# Patient Record
Sex: FEMALE | Race: WHITE | Employment: OTHER | URBAN - METROPOLITAN AREA
[De-identification: names, ages, dates, MRNs, and addresses within clinical notes are randomized per-mention and may not be internally consistent; named-entity substitution may affect disease eponyms.]

---

## 2020-11-05 NOTE — PROGRESS NOTES
Subjective:      Patient ID: Melissa Arellano is a 77 y.o. female. HPI  History and Physical    Patient's Name/Date of Birth: Melissa Arellano / 1955    Date:2020     Melissa Arellano presents for evaluation of newly diagnosed left breast cancer. ER+OR+ Intracystic papillary carcinoma. Atypical lobular hyperplasia. PCP: Dr. Divya Holbrook,     The lesion is located in the upper inner quadrant position of the Left breast. The lesion was discovered by mammogram/exam. The patient has not noted a change in BSE since presentation. Patient denies nipple discharge. Patient denies a personal history of breast cancer. Breast cancer risk factors include gender, age  Ashkenazi Jain Ancestry: No.    OBSTETRIC RELATED HISTORY:  Age of menarche was 13. Age of menopause was 53. Patient denies hormonal therapy. Patient is . Age of first live birth was 30. Patient did not breast feed. Is patient interested in fertility information about fertility preservation? No    CANCER SURVEILLANCE HISTORY:  Mammograms: Yes  Breast MRI's: No   Breast Biopsies: Yes   Colonoscopy: Yes   GI Polyps: Yes   EGD: Yes   Pelvic Exam: Yes   Pap Smear: Yes   Dermatology: No   Lung screening: no      Have you received your flu vaccination this year? Yes  Have you received your Pneumococcal vaccination? Yes      Estimated body mass index is 29.95 kg/m² as calculated from the following:    Height as of this encounter: 5' 5\" (1.651 m). Weight as of this encounter: 180 lb (81.6 kg). Bra Size: 42d    Because violence is so common, we ask all our patients: are you in a relationship or do you live with a person who threatens, hurts, or controls you:  denies    Patient drinks moderate caffeinated beverages. Patient does not smoke cigarettes. Patient does not use recreational drugs.  Patient admits to drinking 4 cans beer a day          Past Medical History:   Diagnosis Date    Allergic rhinitis     seasonal    [facility]     Hypothyroidism        Past Surgical History:   Procedure Laterality Date    COLONOSCOPY  2016    TUBAL LIGATION         Current Outpatient Medications   Medication Sig Dispense Refill    levothyroxine (SYNTHROID) 50 MCG tablet Take 50 mcg by mouth Daily      ibuprofen (ADVIL;MOTRIN) 200 MG tablet Take 200 mg by mouth every 6 hours as needed for Pain       No current facility-administered medications for this visit. No Known Allergies    Family History   Problem Relation Age of Onset    Cancer Father 79        prostate cancer    Cancer Brother 54        prostate    Cancer Brother 54        prostate cancer       Social History     Socioeconomic History    Marital status: Unknown     Spouse name: Not on file    Number of children: Not on file    Years of education: Not on file    Highest education level: Not on file   Occupational History    Not on file   Social Needs    Financial resource strain: Not on file    Food insecurity     Worry: Not on file     Inability: Not on file    Transportation needs     Medical: Not on file     Non-medical: Not on file   Tobacco Use    Smoking status: Former Smoker     Last attempt to quit: 2000     Years since quittin.8    Smokeless tobacco: Never Used   Substance and Sexual Activity    Alcohol use:  Yes     Alcohol/week: 4.0 standard drinks     Types: 4 Cans of beer per week     Comment: has drank for years    Drug use: Never    Sexual activity: Not on file   Lifestyle    Physical activity     Days per week: Not on file     Minutes per session: Not on file    Stress: Not on file   Relationships    Social connections     Talks on phone: Not on file     Gets together: Not on file     Attends Scientology service: Not on file     Active member of club or organization: Not on file     Attends meetings of clubs or organizations: Not on file     Relationship status: Not on file    Intimate partner violence     Fear of current or ex partner: Not on file     Emotionally abused: Not on file     Physically abused: Not on file     Forced sexual activity: Not on file   Other Topics Concern    Not on file   Social History Narrative    Not on file       Occupation: retired. Worked customer service prior. Review of Systems  CONSTITUTIONAL: No fever, chills. Good appetite and energy level. ENMT: Eyes: No diplopia; Nose: No epistaxis. Mouth: No sore throat. RESPIRATORY: No hemoptysis, shortness of breath, cough. CARDIOVASCULAR: No chest pain, pressure, or palpitations. GASTROINTESTINAL: No nausea/vomiting, abdominal pain, diarrhea/constipation. No blood in the stools. GENITOURINARY: No dysuria, urinary frequency, hematuria. NEURO: No syncope, presyncope, headache. Remainder:  ROS NEGATIVE    Patient denies previous history of DVT/PE. Review of systems as noted above completely reviewed with patient. No changes. Objective:   Physical Exam  Vitals signs and nursing note reviewed. Exam conducted with a chaperone present. Constitutional:       General: She is not in acute distress. Appearance: She is well-developed. She is not diaphoretic. HENT:      Head: Normocephalic and atraumatic. Eyes:      Conjunctiva/sclera: Conjunctivae normal.      Pupils: Pupils are equal, round, and reactive to light. Neck:      Musculoskeletal: Normal range of motion and neck supple. Thyroid: No thyromegaly. Trachea: No tracheal deviation. Cardiovascular:      Rate and Rhythm: Normal rate and regular rhythm. Heart sounds: Normal heart sounds. Pulmonary:      Effort: Pulmonary effort is normal. No respiratory distress. Breath sounds: Normal breath sounds. Chest:      Breasts: Breasts are symmetrical.         Right: No inverted nipple, mass, nipple discharge, skin change or tenderness. Left: No inverted nipple, mass, nipple discharge, skin change or tenderness. Abdominal:      General: There is no distension. Palpations: Abdomen is soft. Musculoskeletal:      Right shoulder: She exhibits normal range of motion. Left shoulder: She exhibits normal range of motion. Lymphadenopathy:      Cervical: No cervical adenopathy. Upper Body:      Right upper body: No supraclavicular adenopathy. Left upper body: No supraclavicular adenopathy. Skin:     General: Skin is warm and dry. Coloration: Skin is not pale. Findings: No erythema. Neurological:      Mental Status: She is alert and oriented to person, place, and time. Psychiatric:         Behavior: Behavior normal.         Thought Content: Thought content normal.         Judgment: Judgment normal.                Assessment:      77 y.o. woman who underwent an US guided left breast core biopsy on September 29 , 2020. Pathological evaluation completed at [facility]:      9/1/2020-BILATERAL MAMMOGRAM; [facility]    9/18/2020-ultrasound [address] [place] [address]          9/29/2020; [address] [place] [address]        9/29/2020-Pathology report-[address]        Clinical stage 0 left breast cancer. All imaging reviewed with patient. Plan needle localized lumpectomy for treatment and definitive diagnosis. Questions answered to patient satisfaction. Plan:      1. Left needle localized lumpectomy/LMAC  2. Patient has met with our Breast Navigator for information and to receive literature. 3. If indicated outside slides will be obtained and reviewed by Pathology at [facility]. 4. We had a discussion of the treatment options for breast cancer including risks, benefits, and recurrence rates for breast conservation therapy versus mastectomy. We discussed reconstruction options. We discussed the indications and risks of sentinel lymph node biopsy and possibility of axillary lymph node dissection. We also discussed the possible role of systemic and radiation therapy.  NCCN guidelines were

## 2020-11-06 ENCOUNTER — TELEPHONE (OUTPATIENT)
Dept: BREAST CENTER | Age: 65
End: 2020-11-06

## 2020-11-09 ENCOUNTER — TELEPHONE (OUTPATIENT)
Dept: CASE MANAGEMENT | Age: 65
End: 2020-11-09

## 2020-11-09 ENCOUNTER — OFFICE VISIT (OUTPATIENT)
Dept: BREAST CENTER | Age: 65
End: 2020-11-09
Payer: MEDICARE

## 2020-11-09 ENCOUNTER — HOSPITAL ENCOUNTER (OUTPATIENT)
Dept: GENERAL RADIOLOGY | Age: 65
Discharge: HOME | End: 2020-11-11
Payer: MEDICARE

## 2020-11-09 VITALS
SYSTOLIC BLOOD PRESSURE: 138 MMHG | DIASTOLIC BLOOD PRESSURE: 68 MMHG | BODY MASS INDEX: 29.99 KG/M2 | RESPIRATION RATE: 18 BRPM | WEIGHT: 180 LBS | TEMPERATURE: 98.6 F | HEIGHT: 65 IN | OXYGEN SATURATION: 98 % | HEART RATE: 58 BPM

## 2020-11-09 PROCEDURE — 99204 OFFICE O/P NEW MOD 45 MIN: CPT | Performed by: SURGERY

## 2020-11-09 PROCEDURE — 99203 OFFICE O/P NEW LOW 30 MIN: CPT | Performed by: SURGERY

## 2020-11-09 PROCEDURE — 71046 X-RAY EXAM CHEST 2 VIEWS: CPT

## 2020-11-09 RX ORDER — IBUPROFEN 200 MG
200 TABLET ORAL EVERY 6 HOURS PRN
Status: ON HOLD | COMMUNITY
End: 2020-12-04 | Stop reason: HOSPADM

## 2020-11-09 RX ORDER — LEVOTHYROXINE SODIUM 0.05 MG/1
50 TABLET ORAL DAILY
COMMUNITY

## 2020-11-09 NOTE — LETTER
[facility] Carmelina Valladares  [address] [place] [id number]  Phone: [phone]  Fax: [phone]    Tian Prescott MD        November 9, 3194     [facility], DO  [address]    Patient: Windy Busch  MR Number: <[id number]>  YOB: 1955  Date of Visit: 11/9/2020    Dear Dr. DAJA MALAGON:    Thank you for the request for consultation for Windy Busch to me for the evaluation of her left breast lesion. Below are the relevant portions of my assessment and plan of care. 77 y.o. woman who underwent an US guided left breast core biopsy  on September 29 , 2020. Pathological evaluation completed at [facility]:      9/1/2020-BILATERAL MAMMOGRAM; [facility]    9/18/2020-ultrasound [employer]            9/29/2020; ULTRASOUND GUIDED BIOPSY LEFT BREAST-[facility]          9/29/2020-Pathology report-[facility]          Clinical stage 0 left breast cancer. All imaging reviewed with patient. Plan needle localized lumpectomy for treatment and definitive diagnosis. Questions answered to patient satisfaction. If you have questions, please do not hesitate to call me. I look forward to following [facility] along with you.     Sincerely,  Tian Prescott MD

## 2020-11-09 NOTE — TELEPHONE ENCOUNTER
Met with patient regarding her recent breast cancer diagnosis. Instructed in detail on her breast biopsy pathology findings including cancer type Left (DCIS) and hormone receptor status (ER+, ID+). Instructed on next steps including breast surgery options, lymph node biopsy procedures and additional imaging that may be required. Informed patient that this is the beginning of their breast cancer journey and when treatment has been completed she will receive a [address] detailing the events of her specific treatment plan. Provided with extensive literature including \"Be A Survivor: Your guide to breast cancer treatment\", Your Guide to Your Breast Cancer Pathology Report, American Cancer society Exercises after breast surgery and Lymphedema Early Signs and Symptoms. Written materials on local and national support and informational groups. Today patient received copies of their pathology and imaging reports (if available) as well as a list of local medical oncology providers. Provided patient with my contact information, office hours, and encouragement to call me with questions or concerns. Patient verbalizes understanding and appreciative of nurse navigator visit. Emotional support provided and greater than 30 minutes spent with patient. Nurse navigator will continue to follow.

## 2020-11-09 NOTE — PATIENT INSTRUCTIONS
Surgery will be scheduled. We will be in contact with you soon. RELEASE OF RESULTS AND RECORDS  As of October 1, 2020, all results (x-ray reports, labwork, pathology reports) will be released through [address] in real time per federal law. Once your test results are final, they will be automatically released to your electronic medical record [employer] where you will be able to see those results and any clinical notes associated with that result. In some cases, you may see those results and notes before your provider or the staff have had a chance to review. We will make every effort to contact you, especially about any abnormal or confusing results. If you view a result before we have contacted you, please wait up to one business day for us to reach you before calling with questions. If anything in your notes seems inaccurate, please message us through [facility] with potential corrections. If you see a term or language in your clinical note that doesn't make sense, please use the online health library reference tool in your MyChart (under the Health tab)  to help you interpret the note.

## 2020-11-12 ENCOUNTER — TELEPHONE (OUTPATIENT)
Dept: BREAST CENTER | Age: 65
End: 2020-11-12

## 2020-11-12 NOTE — TELEPHONE ENCOUNTER
Patient surgery has been scheduled with surgery scheduling 12/04/20 @ 10:00am /8:00am / Arrival 6:30am - Left breast localization lumpectomy - LMAC - [facility]. Patient notified of date and time of surgery. Patient was instructed to [facility] the [facility] entrance of the hospital. Patient was instructed NPO after midnight the night prior to surgery. Patient was instructed NO ASA products or blood thinners 5-7 days prior to surgery. Patient instruction and post op instructions sheet mailed to patient. Patient was instructed to bring a sports bra with her day of surgery. Post op visit - 12/14/20 @ 10:15am [facility]. No prior authorization required.

## 2020-11-17 ENCOUNTER — PREP FOR PROCEDURE (OUTPATIENT)
Dept: BREAST CENTER | Age: 65
End: 2020-11-17

## 2020-11-17 RX ORDER — SODIUM CHLORIDE 0.9 % (FLUSH) 0.9 %
10 SYRINGE (ML) INJECTION PRN
Status: CANCELLED | OUTPATIENT
Start: 2020-11-17

## 2020-11-17 RX ORDER — SODIUM CHLORIDE, SODIUM LACTATE, POTASSIUM CHLORIDE, CALCIUM CHLORIDE 600; 310; 30; 20 MG/100ML; MG/100ML; MG/100ML; MG/100ML
INJECTION, SOLUTION INTRAVENOUS CONTINUOUS
Status: CANCELLED | OUTPATIENT
Start: 2020-11-17

## 2020-11-17 RX ORDER — SODIUM CHLORIDE 0.9 % (FLUSH) 0.9 %
10 SYRINGE (ML) INJECTION EVERY 12 HOURS SCHEDULED
Status: CANCELLED | OUTPATIENT
Start: 2020-11-17

## 2020-11-30 ENCOUNTER — HOSPITAL ENCOUNTER (OUTPATIENT)
Age: 65
Discharge: HOME | End: 2020-12-02
Payer: MEDICARE

## 2020-11-30 PROCEDURE — U0003 INFECTIOUS AGENT DETECTION BY NUCLEIC ACID (DNA OR RNA); SEVERE ACUTE RESPIRATORY SYNDROME CORONAVIRUS 2 (SARS-COV-2) (CORONAVIRUS DISEASE [COVID-19]), AMPLIFIED PROBE TECHNIQUE, MAKING USE OF HIGH THROUGHPUT TECHNOLOGIES AS DESCRIBED BY CMS-2020-01-R: HCPCS

## 2020-12-01 NOTE — PROGRESS NOTES
your machine if you are to spend the night in the hospital.     PARKING INSTRUCTIONS:   [x] Arrival Time:___0630__________  [x] PLEASE ARRIVE TO [address] [place] [address] 12/4/2020 AT 0630 AM   yOU WILL BE DIRECTED TO PRE OP. SURGERY WITH DR Isa Alfaro         [x] To reach the [facility] hernandez from [facility], upon entering the hospital, take elevator B to the 3rd floor. EDUCATION INSTRUCTIONS:      [] Knee or hip replacement booklet & exercise pamphlets given. [] [address] placed in chart. [] Pre-admission Testing educational folder given  [x] Incentive Spirometry,coughing & deep breathing exercises reviewed. [x]Medication information sheet(s)   [x]Fluoroscopy-Xray used in surgery reviewed with patient. Educational pamphlet placed in chart. [x]Pain: Post-op pain is normal and to be expected. You will be asked to rate your pain from 0-10(a zero is not acceptable-education is needed). Your post-op pain goal is:  [x] Ask your nurse for your pain medication. [] Joint camp offered. [] Joint replacement booklets given. [] Other:___________________________    MEDICATION INSTRUCTIONS:   [x]Bring a complete list of your medications, please write the last time you took the medicine, give this list to the nurse. [x] Take the following medications the morning of surgery with 1-2 ounces of water: SEE LIST  [x] Stop herbal supplements and vitamins 5 days before your surgery. [] DO NOT take any diabetic medicine the morning of surgery. Follow instructions for insulin the day before surgery. [] If you are diabetic and your blood sugar is low or you feel symptomatic, you may drink 1-2 ounces of apple juice or take a glucose tablet. The morning of your procedure, you may call the pre-op area if you have concerns about your blood sugar [phone]. [] Use your inhalers the morning of surgery. Bring your emergency inhaler with you day of surgery.    [x] Follow physician instructions regarding any blood thinners you may be taking. WHAT TO EXPECT:  [x] The day of surgery you will be greeted and checked in by the [employer].  In addition, you will be registered in the [place] by a Patient Access Representative. Please bring your photo ID and insurance card. A nurse will greet you in accordance to the time you are needed in the pre-op area to prepare you for surgery. Please do not be discouraged if you are not greeted in the order you arrive as there are many variables that are involved in patient preparation. Your patience is greatly appreciated as you wait for your nurse. Please bring in items such as: books, magazines, newspapers, electronics, or any other items  to occupy your time in the waiting area. [x]  Delays may occur with surgery and staff will make a sincere effort to keep you informed of delays. If any delays occur with your procedure, we apologize ahead of time for your inconvenience as we recognize the value of your time.

## 2020-12-01 NOTE — H&P
Patient ID: Roberth Ragland is a 77 y.o. female. HPI   History and Physical   Patient's Name/Date of Birth: Roberth Ragland / 1955     Roberth Ragland presents for lumpectomy for left breast cancer. ER+DE+ Intracystic papillary carcinoma and atypical lobular hyperplasia. PCP: Dr. Che Olivares,     The lesion is located in the upper inner quadrant position of the Left breast. The lesion was discovered by mammogram/exam. The patient has not noted a change in BSE since presentation. Patient denies nipple discharge. Patient denies a personal history of breast cancer. Breast cancer risk factors include gender, age Ashkenazi Yazidi Ancestry: No.     OBSTETRIC RELATED HISTORY:   Age of menarche was 13. Age of menopause was 53. Patient denies hormonal therapy. Patient is . Age of first live birth was 30. Patient did not breast feed. Is patient interested in fertility information about fertility preservation? No   CANCER SURVEILLANCE HISTORY:   Mammograms: Yes   Breast MRI's: No   Breast Biopsies: Yes   Colonoscopy: Yes    GI Polyps: Yes   EGD: Yes   Pelvic Exam: Yes   Pap Smear: Yes   Dermatology: No   Lung screening: no   Have you received your flu vaccination this year? Yes   Have you received your Pneumococcal vaccination? Yes   Estimated body mass index is 29.95 kg/m² as calculated from the following:   Height as of this encounter: 5' 5\" (1.651 m). Weight as of this encounter: 180 lb (81.6 kg). Bra Size: 42d   Because violence is so common, we ask all our patients: are you in a relationship or do you live with a person who threatens, hurts, or controls you: denies   Patient drinks moderate caffeinated beverages. Patient does not smoke cigarettes. Patient does not use recreational drugs.  Patient admits to drinking 4 cans beer a day   Past Medical History                                           Past Surgical History                                    Current Facility-Administered Medications                                           No Known Allergies   Family History                                                          Social History                                                                                                                                                                                                                                                                                                                  Occupation: retired. Worked customer service prior. Review of Systems   CONSTITUTIONAL: No fever, chills. Good appetite and energy level. ENMT: Eyes: No diplopia; Nose: No epistaxis. Mouth: No sore throat. RESPIRATORY: No hemoptysis, shortness of breath, cough. CARDIOVASCULAR: No chest pain, pressure, or palpitations. GASTROINTESTINAL: No nausea/vomiting, abdominal pain, diarrhea/constipation. No blood in the stools. GENITOURINARY: No dysuria, urinary frequency, hematuria. NEURO: No syncope, presyncope, headache. Remainder: ROS NEGATIVE   Patient denies previous history of DVT/PE. Review of systems as noted above completely reviewed with patient. No changes. Objective:   Physical Exam   Vitals signs and nursing note reviewed. Exam conducted with a chaperone present. Constitutional:   General: She is not in acute distress. Appearance: She is well-developed. She is not diaphoretic. HENT:   Head: Normocephalic and atraumatic. Eyes:   Conjunctiva/sclera: Conjunctivae normal.   Pupils: Pupils are equal, round, and reactive to light. Neck:   Musculoskeletal: Normal range of motion and neck supple. Thyroid: No thyromegaly. Trachea: No tracheal deviation. Cardiovascular:   Rate and Rhythm: Normal rate and regular rhythm. Heart sounds: Normal heart sounds. Pulmonary:   Effort: Pulmonary effort is normal. No respiratory distress. Breath sounds: Normal breath sounds.    Chest:   Breasts: Breasts are symmetrical.   Right: No inverted nipple, mass, nipple discharge, skin change or tenderness. Left: No inverted nipple, mass, nipple discharge, skin change or tenderness. Abdominal:   General: There is no distension. Palpations: Abdomen is soft. Musculoskeletal:   Right shoulder: She exhibits normal range of motion. Left shoulder: She exhibits normal range of motion. Lymphadenopathy:   Cervical: No cervical adenopathy. Upper Body:   Right upper body: No supraclavicular adenopathy. Left upper body: No supraclavicular adenopathy. Skin:   General: Skin is warm and dry. Coloration: Skin is not pale. Findings: No erythema. Neurological:   Mental Status: She is alert and oriented to person, place, and time. Psychiatric:   Behavior: Behavior normal.   Thought Content: Thought content normal.   Judgment: Judgment normal.     Assessment:   77 y.o. woman who underwent an US guided left breast core biopsy on September 29 , 2020. Pathological evaluation completed at [facility]:   9/1/2020-BILATERAL MAMMOGRAM; [facility]     9/18/2020-ultrasound [address] [place] [address]       9/29/2020; [address] [place] [address]     9/29/2020-Pathology report-[address]     Clinical stage 0 left breast cancer. All imaging reviewed with patient. Plan needle localized lumpectomy for treatment and definitive diagnosis. Questions answered to patient satisfaction. Plan: Needle localized left breast lumpectomy/LMAC.     The risks, benefits, expected outcomes, and alternatives to this procedure have been discussed with the patient, which include but are not limited to bleeding, infection, flap necrosis and medical complications to anesthesia or surgery such as pneumonia, heart problems, blood clots, etc. Patient also was told that with minimally invasive procedures adequate margins are not always obtained with the first operation, and she has a >20% chance of requiring a second procedure to obtain clear margins around her tumor. Patient understood and desires to undergo the procedure.

## 2020-12-02 LAB
SARS-COV-2: NOT DETECTED
SOURCE: NORMAL

## 2020-12-03 ENCOUNTER — ANESTHESIA EVENT (OUTPATIENT)
Dept: OPERATING ROOM | Age: 65
End: 2020-12-03
Payer: MEDICARE

## 2020-12-04 ENCOUNTER — HOSPITAL ENCOUNTER (OUTPATIENT)
Age: 65
Setting detail: OUTPATIENT SURGERY
Discharge: HOME | End: 2020-12-04
Attending: SURGERY | Admitting: SURGERY
Payer: MEDICARE

## 2020-12-04 ENCOUNTER — APPOINTMENT (OUTPATIENT)
Dept: GENERAL RADIOLOGY | Age: 65
End: 2020-12-04
Attending: SURGERY
Payer: MEDICARE

## 2020-12-04 ENCOUNTER — HOSPITAL ENCOUNTER (OUTPATIENT)
Dept: GENERAL RADIOLOGY | Age: 65
Discharge: HOME | End: 2020-12-06
Attending: SURGERY
Payer: MEDICARE

## 2020-12-04 ENCOUNTER — ANESTHESIA (OUTPATIENT)
Dept: OPERATING ROOM | Age: 65
End: 2020-12-04
Payer: MEDICARE

## 2020-12-04 VITALS
SYSTOLIC BLOOD PRESSURE: 152 MMHG | WEIGHT: 180 LBS | RESPIRATION RATE: 18 BRPM | TEMPERATURE: 97.2 F | HEART RATE: 57 BPM | BODY MASS INDEX: 29.99 KG/M2 | HEIGHT: 65 IN | OXYGEN SATURATION: 100 % | DIASTOLIC BLOOD PRESSURE: 76 MMHG

## 2020-12-04 VITALS
OXYGEN SATURATION: 99 % | SYSTOLIC BLOOD PRESSURE: 131 MMHG | DIASTOLIC BLOOD PRESSURE: 72 MMHG | RESPIRATION RATE: 13 BRPM

## 2020-12-04 PROCEDURE — 88342 IMHCHEM/IMCYTCHM 1ST ANTB: CPT

## 2020-12-04 PROCEDURE — 88341 IMHCHEM/IMCYTCHM EA ADD ANTB: CPT

## 2020-12-04 PROCEDURE — 3700000001 HC ADD 15 MINUTES (ANESTHESIA): Performed by: SURGERY

## 2020-12-04 PROCEDURE — 2500000003 HC RX 250 WO HCPCS

## 2020-12-04 PROCEDURE — 88307 TISSUE EXAM BY PATHOLOGIST: CPT

## 2020-12-04 PROCEDURE — 3600000003 HC SURGERY LEVEL 3 BASE: Performed by: SURGERY

## 2020-12-04 PROCEDURE — 76098 X-RAY EXAM SURGICAL SPECIMEN: CPT

## 2020-12-04 PROCEDURE — 2709999900 HC NON-CHARGEABLE SUPPLY: Performed by: SURGERY

## 2020-12-04 PROCEDURE — 7100000011 HC PHASE II RECOVERY - ADDTL 15 MIN: Performed by: SURGERY

## 2020-12-04 PROCEDURE — 3700000000 HC ANESTHESIA ATTENDED CARE: Performed by: SURGERY

## 2020-12-04 PROCEDURE — 88360 TUMOR IMMUNOHISTOCHEM/MANUAL: CPT

## 2020-12-04 PROCEDURE — 6360000002 HC RX W HCPCS

## 2020-12-04 PROCEDURE — 7100000010 HC PHASE II RECOVERY - FIRST 15 MIN: Performed by: SURGERY

## 2020-12-04 PROCEDURE — 2720000010 HC SURG SUPPLY STERILE: Performed by: SURGERY

## 2020-12-04 PROCEDURE — 19281 PERQ DEVICE BREAST 1ST IMAG: CPT

## 2020-12-04 PROCEDURE — 2580000003 HC RX 258

## 2020-12-04 PROCEDURE — 2500000003 HC RX 250 WO HCPCS: Performed by: SURGERY

## 2020-12-04 PROCEDURE — 6360000002 HC RX W HCPCS: Performed by: SURGERY

## 2020-12-04 PROCEDURE — 19301 PARTIAL MASTECTOMY: CPT | Performed by: SURGERY

## 2020-12-04 PROCEDURE — 3600000013 HC SURGERY LEVEL 3 ADDTL 15MIN: Performed by: SURGERY

## 2020-12-04 RX ORDER — KETOROLAC TROMETHAMINE 30 MG/ML
INJECTION, SOLUTION INTRAMUSCULAR; INTRAVENOUS PRN
Status: DISCONTINUED | OUTPATIENT
Start: 2020-12-04 | End: 2020-12-04 | Stop reason: SDUPTHER

## 2020-12-04 RX ORDER — MIDAZOLAM HYDROCHLORIDE 1 MG/ML
INJECTION INTRAMUSCULAR; INTRAVENOUS PRN
Status: DISCONTINUED | OUTPATIENT
Start: 2020-12-04 | End: 2020-12-04 | Stop reason: SDUPTHER

## 2020-12-04 RX ORDER — ACETAMINOPHEN 500 MG
500 TABLET ORAL 4 TIMES DAILY PRN
Qty: 120 TABLET | Refills: 0 | Status: SHIPPED | OUTPATIENT
Start: 2020-12-04 | End: 2021-03-30

## 2020-12-04 RX ORDER — SODIUM CHLORIDE 0.9 % (FLUSH) 0.9 %
10 SYRINGE (ML) INJECTION EVERY 12 HOURS SCHEDULED
Status: DISCONTINUED | OUTPATIENT
Start: 2020-12-04 | End: 2020-12-04 | Stop reason: HOSPADM

## 2020-12-04 RX ORDER — ONDANSETRON 2 MG/ML
4 INJECTION INTRAMUSCULAR; INTRAVENOUS
Status: DISCONTINUED | OUTPATIENT
Start: 2020-12-04 | End: 2020-12-04 | Stop reason: HOSPADM

## 2020-12-04 RX ORDER — BUPIVACAINE HYDROCHLORIDE 2.5 MG/ML
INJECTION, SOLUTION EPIDURAL; INFILTRATION; INTRACAUDAL PRN
Status: DISCONTINUED | OUTPATIENT
Start: 2020-12-04 | End: 2020-12-04 | Stop reason: ALTCHOICE

## 2020-12-04 RX ORDER — MORPHINE SULFATE 2 MG/ML
1 INJECTION, SOLUTION INTRAMUSCULAR; INTRAVENOUS EVERY 5 MIN PRN
Status: DISCONTINUED | OUTPATIENT
Start: 2020-12-04 | End: 2020-12-04 | Stop reason: HOSPADM

## 2020-12-04 RX ORDER — MORPHINE SULFATE 2 MG/ML
2 INJECTION, SOLUTION INTRAMUSCULAR; INTRAVENOUS EVERY 5 MIN PRN
Status: DISCONTINUED | OUTPATIENT
Start: 2020-12-04 | End: 2020-12-04 | Stop reason: HOSPADM

## 2020-12-04 RX ORDER — MEPERIDINE HYDROCHLORIDE 25 MG/ML
12.5 INJECTION INTRAMUSCULAR; INTRAVENOUS; SUBCUTANEOUS EVERY 5 MIN PRN
Status: DISCONTINUED | OUTPATIENT
Start: 2020-12-04 | End: 2020-12-04 | Stop reason: HOSPADM

## 2020-12-04 RX ORDER — IBUPROFEN 600 MG/1
600 TABLET ORAL 4 TIMES DAILY PRN
Qty: 40 TABLET | Refills: 0 | Status: SHIPPED | OUTPATIENT
Start: 2020-12-04 | End: 2021-03-30

## 2020-12-04 RX ORDER — SODIUM CHLORIDE, SODIUM LACTATE, POTASSIUM CHLORIDE, CALCIUM CHLORIDE 600; 310; 30; 20 MG/100ML; MG/100ML; MG/100ML; MG/100ML
INJECTION, SOLUTION INTRAVENOUS CONTINUOUS
Status: DISCONTINUED | OUTPATIENT
Start: 2020-12-04 | End: 2020-12-04 | Stop reason: HOSPADM

## 2020-12-04 RX ORDER — FENTANYL CITRATE 50 UG/ML
25 INJECTION, SOLUTION INTRAMUSCULAR; INTRAVENOUS EVERY 5 MIN PRN
Status: DISCONTINUED | OUTPATIENT
Start: 2020-12-04 | End: 2020-12-04 | Stop reason: HOSPADM

## 2020-12-04 RX ORDER — FENTANYL CITRATE 50 UG/ML
50 INJECTION, SOLUTION INTRAMUSCULAR; INTRAVENOUS EVERY 5 MIN PRN
Status: DISCONTINUED | OUTPATIENT
Start: 2020-12-04 | End: 2020-12-04 | Stop reason: HOSPADM

## 2020-12-04 RX ORDER — SODIUM CHLORIDE, SODIUM LACTATE, POTASSIUM CHLORIDE, CALCIUM CHLORIDE 600; 310; 30; 20 MG/100ML; MG/100ML; MG/100ML; MG/100ML
INJECTION, SOLUTION INTRAVENOUS CONTINUOUS PRN
Status: DISCONTINUED | OUTPATIENT
Start: 2020-12-04 | End: 2020-12-04 | Stop reason: SDUPTHER

## 2020-12-04 RX ORDER — SODIUM CHLORIDE 0.9 % (FLUSH) 0.9 %
10 SYRINGE (ML) INJECTION PRN
Status: DISCONTINUED | OUTPATIENT
Start: 2020-12-04 | End: 2020-12-04 | Stop reason: HOSPADM

## 2020-12-04 RX ORDER — PROPOFOL 10 MG/ML
INJECTION, EMULSION INTRAVENOUS CONTINUOUS PRN
Status: DISCONTINUED | OUTPATIENT
Start: 2020-12-04 | End: 2020-12-04 | Stop reason: SDUPTHER

## 2020-12-04 RX ORDER — OXYCODONE HYDROCHLORIDE AND ACETAMINOPHEN 5; 325 MG/1; MG/1
1 TABLET ORAL
Status: DISCONTINUED | OUTPATIENT
Start: 2020-12-04 | End: 2020-12-04 | Stop reason: HOSPADM

## 2020-12-04 RX ORDER — FENTANYL CITRATE 50 UG/ML
INJECTION, SOLUTION INTRAMUSCULAR; INTRAVENOUS PRN
Status: DISCONTINUED | OUTPATIENT
Start: 2020-12-04 | End: 2020-12-04 | Stop reason: SDUPTHER

## 2020-12-04 RX ORDER — LIDOCAINE HYDROCHLORIDE 20 MG/ML
INJECTION, SOLUTION INTRAVENOUS PRN
Status: DISCONTINUED | OUTPATIENT
Start: 2020-12-04 | End: 2020-12-04 | Stop reason: SDUPTHER

## 2020-12-04 RX ADMIN — PROPOFOL 75 MCG/KG/MIN: 10 INJECTION, EMULSION INTRAVENOUS at 13:53

## 2020-12-04 RX ADMIN — KETOROLAC TROMETHAMINE 30 MG: 30 INJECTION, SOLUTION INTRAMUSCULAR; INTRAVENOUS at 14:40

## 2020-12-04 RX ADMIN — MIDAZOLAM 2 MG: 1 INJECTION INTRAMUSCULAR; INTRAVENOUS at 13:44

## 2020-12-04 RX ADMIN — FENTANYL CITRATE 50 MCG: 50 INJECTION, SOLUTION INTRAMUSCULAR; INTRAVENOUS at 13:53

## 2020-12-04 RX ADMIN — LIDOCAINE HYDROCHLORIDE 40 MG: 20 INJECTION, SOLUTION INTRAVENOUS at 13:53

## 2020-12-04 RX ADMIN — FENTANYL CITRATE 50 MCG: 50 INJECTION, SOLUTION INTRAMUSCULAR; INTRAVENOUS at 14:14

## 2020-12-04 RX ADMIN — SODIUM CHLORIDE, POTASSIUM CHLORIDE, SODIUM LACTATE AND CALCIUM CHLORIDE: 600; 310; 30; 20 INJECTION, SOLUTION INTRAVENOUS at 13:35

## 2020-12-04 RX ADMIN — Medication 2 G: at 13:47

## 2020-12-04 ASSESSMENT — PULMONARY FUNCTION TESTS
PIF_VALUE: 26
PIF_VALUE: 1
PIF_VALUE: 1
PIF_VALUE: 2
PIF_VALUE: 33
PIF_VALUE: 27
PIF_VALUE: 1
PIF_VALUE: 2
PIF_VALUE: 0
PIF_VALUE: 1
PIF_VALUE: 1
PIF_VALUE: 27
PIF_VALUE: 27
PIF_VALUE: 0
PIF_VALUE: 26
PIF_VALUE: 0
PIF_VALUE: 1
PIF_VALUE: 1
PIF_VALUE: 6
PIF_VALUE: 1
PIF_VALUE: 28
PIF_VALUE: 27
PIF_VALUE: 28
PIF_VALUE: 0
PIF_VALUE: 27
PIF_VALUE: 0
PIF_VALUE: 0
PIF_VALUE: 27
PIF_VALUE: 28
PIF_VALUE: 1
PIF_VALUE: 1
PIF_VALUE: 27
PIF_VALUE: 0
PIF_VALUE: 27
PIF_VALUE: 1
PIF_VALUE: 1
PIF_VALUE: 27
PIF_VALUE: 1
PIF_VALUE: 1
PIF_VALUE: 28
PIF_VALUE: 27
PIF_VALUE: 1
PIF_VALUE: 1
PIF_VALUE: 30
PIF_VALUE: 27
PIF_VALUE: 0
PIF_VALUE: 1
PIF_VALUE: 26
PIF_VALUE: 1
PIF_VALUE: 1
PIF_VALUE: 0
PIF_VALUE: 26
PIF_VALUE: 5

## 2020-12-04 ASSESSMENT — PAIN DESCRIPTION - DESCRIPTORS: DESCRIPTORS: ACHING;SORE

## 2020-12-04 ASSESSMENT — PAIN DESCRIPTION - LOCATION: LOCATION: BREAST

## 2020-12-04 ASSESSMENT — PAIN - FUNCTIONAL ASSESSMENT: PAIN_FUNCTIONAL_ASSESSMENT: 0-10

## 2020-12-04 ASSESSMENT — PAIN DESCRIPTION - ORIENTATION: ORIENTATION: LEFT

## 2020-12-04 ASSESSMENT — PAIN SCALES - GENERAL
PAINLEVEL_OUTOF10: 0
PAINLEVEL_OUTOF10: 2

## 2020-12-04 ASSESSMENT — PAIN DESCRIPTION - PAIN TYPE: TYPE: SURGICAL PAIN

## 2020-12-04 NOTE — ANESTHESIA PRE PROCEDURE
Department of Anesthesiology  Preprocedure Note       Name:  Karolyn Denny   Age:  77 y.o.  :  1955                                          MRN:  [id number]         Date:  2020      Surgeon: Adrianne Mike):  Ryan Bui MD    Procedure: Procedure(s):  LEFT BREAST NEEDLE LOCALIZED LUMPECTOMY --TRIDENT    Medications prior to admission:   Prior to Admission medications    Medication Sig Start Date End Date Taking? Authorizing Provider   levothyroxine (SYNTHROID) 50 MCG tablet Take 50 mcg by mouth Daily   Yes Historical Provider, MD   ibuprofen (ADVIL;MOTRIN) 200 MG tablet Take 200 mg by mouth every 6 hours as needed for Pain   Yes Historical Provider, MD       Current medications:    Current Facility-Administered Medications   Medication Dose Route Frequency Provider Last Rate Last Dose    ceFAZolin (ANCEF) 2 g in sterile water 20 mL IV syringe  2 g Intravenous On Call to [address] [place] [address], MD        lactated ringers infusion   Intravenous Continuous Ryan Bui MD        sodium chloride flush 0.9 % injection 10 mL  10 mL Intravenous 2 times per day Ryan Bui MD        sodium chloride flush 0.9 % injection 10 mL  10 mL Intravenous PRN Ryan Bui MD           Allergies:  No Known Allergies    Problem List:  There is no problem list on file for this patient. Past Medical History:        Diagnosis Date    Allergic rhinitis     seasonal    Cancer ([facility] [address]     Hypothyroidism        Past Surgical History:        Procedure Laterality Date    COLONOSCOPY  2016    TUBAL LIGATION         Social History:    Social History     Tobacco Use    Smoking status: Former Smoker     Last attempt to quit: 2000     Years since quittin.9    Smokeless tobacco: Never Used   Substance Use Topics    Alcohol use:  Yes     Alcohol/week: 4.0 standard drinks     Types: 4 Cans of beer per week     Comment: has drank for years                                Counseling given: Not Answered      Vital Signs (Current): Vitals:    12/01/20 1411 12/04/20 0649   BP:  (!) 186/85   Pulse:  80   Resp:  16   Temp:  97.5 °F (36.4 °C)   TempSrc:  Temporal   SpO2:  100%   Weight: 180 lb (81.6 kg) 180 lb (81.6 kg)   Height: 5' 5\" (1.651 m) 5' 5\" (1.651 m)                                              BP Readings from Last 3 Encounters:   12/04/20 (!) 186/85   11/09/20 138/68       NPO Status: Time of last liquid consumption: 2230                        Time of last solid consumption: 1900                        Date of last liquid consumption: 12/03/20                        Date of last solid food consumption: 12/03/20    BMI:   Wt Readings from Last 3 Encounters:   12/04/20 180 lb (81.6 kg)   11/09/20 180 lb (81.6 kg)     Body mass index is 29.95 kg/m². CBC: No results found for: WBC, RBC, HGB, HCT, MCV, RDW, PLT    CMP: No results found for: NA, K, CL, CO2, BUN, CREATININE, GFRAA, AGRATIO, LABGLOM, GLUCOSE, PROT, CALCIUM, BILITOT, ALKPHOS, AST, ALT    POC Tests: No results for input(s): POCGLU, POCNA, POCK, POCCL, POCBUN, POCHEMO, POCHCT in the last 72 hours.     Coags: No results found for: PROTIME, INR, APTT    HCG (If Applicable): No results found for: PREGTESTUR, PREGSERUM, HCG, HCGQUANT     ABGs: No results found for: PHART, PO2ART, PNX1DUI, USP8ZKQ, BEART, T9GTOBRW     Type & Screen (If Applicable):  No results found for: LABABO, LABRH    Drug/Infectious Status (If Applicable):  No results found for: HIV, HEPCAB    COVID-19 Screening (If Applicable):   Lab Results   Component Value Date    COVID19 Not Detected 11/30/2020         Anesthesia Evaluation  Patient summary reviewed and Nursing notes reviewed no history of anesthetic complications:   Airway: Mallampati: II        Dental:    (+) upper dentures      Pulmonary:Negative Pulmonary ROS breath sounds clear to auscultation                             Cardiovascular:Negative CV ROS            Rhythm: regular  Rate: normal                    Neuro/Psych:   Negative Neuro/Psych ROS              GI/Hepatic/Renal: Neg GI/Hepatic/Renal ROS            Endo/Other:    (+) hypothyroidism::., .                  ROS comment: Left breast mass Abdominal:           Vascular: negative vascular ROS. Anesthesia Plan      MAC     ASA 3     (GA backup, if necessary.)        Anesthetic plan and risks discussed with patient. Plan discussed with CRNA. Yandy Earl DO   12/4/2020    Patient seen and examined, chart reviewed, agree with above findings. Anesthetic plan, risks, benefits, alternatives, and personnel involved discussed with patient. Patient verbalized an understanding and agreed to proceed. NPO status confirmed. Anesthetic plan discussed with care team members and agreed upon.     Yandy Earl DO   12/4/2020  10:23 AM

## 2020-12-04 NOTE — PROGRESS NOTES
Admitted to Same Day Surgery Unit. Preop instructions given to patient & family. Covid Test done: Yes    Results: Not detected    Self-quarantine guidelines followed since tested? Yes    Any unusual S/S or concerns expressed or observed?  None

## 2020-12-04 NOTE — OP NOTE
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    Post-Op Diagnosis: Same       Procedure(s):  LEFT BREAST NEEDLE LOCALIZED LUMPECTOMY --TRIDENT    Surgeon(s):  Thomas Walls MD    Assistant:   Resident: Rey Perkins DO    Anesthesia: Monitor Anesthesia Care    Estimated Blood Loss (mL): Minimal    Complications: None    Specimens:   ID Type Source Tests Collected by Time Destination   A : LEFT BREAST LUMPECTOMY Tissue Tissue SURGICAL PATHOLOGY Thomas Walls MD 12/4/2020 1426        Implants:  * No implants in log *      Drains: * No LDAs found *    Findings: No abnormal findings discovered for this procedure    Detailed Description of Procedure:     Informed consent obtained in the preoperative holding area. The patient was brought to the operating room from preop, and received 2 g of Ancef intravenously. PCDs were in place on her lower extremity. A bear hugger was placed over her abdomen. After induction of general anesthesia, the Left breast, chest wall, Left arm, shoulder and medial Left breast were prepped with ChloraPrep. After 3 minutes the patient was draped in the usual sterile fashion. A curvilinear incision was made at the areolar-dermal junction in the superior lateral quadrant. Plasma blade electrocautery was utilized to dissect down until the tumor and needle could be felt. The needle was cut with hair and the tail end was removed from the breast. The tumor was grasped with an allis clamp and normal appearing adipose tissue surrounding the tumor was scored with electrocautery. Dissection was performed circumferentially around the mass, then underneath it. The medial margin was clipped for identification after removal. The tumor was extricated. The cavity was copiously irrigated with sterile saline. Hemostasis was achieved with electrocautery and vessel clips. The mass was painted on six sides for identification and placed in the [address] [place] [address]. Intraoperative mammography demonstrated the mass contained that was removed contained the tip of the localizing needle and the marking clip. Radiology confirmed circumferentially excellent margins were obtained. Deep  sutures with 3-0 vicryl were placed to approximate the incision. Dermal 4-0 vicryl was used to line the skin up. 5-0 vicryl was used in running subcuticular fashion for skin closure. Steri-Strips and sterile dressings were applied to both sites. The patient tolerated the procedure well, was extubated and went to recovery in good condition.        Dr. Merrill Rizzo was scrubbed in and present for the entire procedure    Electronically signed by Bindu Francis DO on 12/4/2020 at 2:56 PM

## 2020-12-10 NOTE — PROGRESS NOTES
Subjective:      Patient ID: Mario Vann is a 77 y.o. female. HPI  Patient is here for a post-operative visit. She underwent left needle localized lumpectomy on December 4, 2020 and a left axillary sentinel lymph node biopsy on December 18, 2020 Pathological evaluation completed at [facility]):    Pathology report discussed. 12/10/2020:Pathology     Diagnosis:   Left breast, lumpectomy: Invasive lobular and invasive ductal carcinoma   with foci of lobular carcinoma in situ, ductal carcinoma in situ,   low-grade, cribriform type and encapsulated papillary carcinoma.  Margins   of excision free of carcinoma.  See comment     Comment:   The lumpectomy specimen contains a mixed invasive lobular   (E-cadherin negative, 1.2 x 0.6 x 0.5 cm) and ductal carcinoma   (E-cadherin positive, 1.5 x 1.3 x 1.1 cm).  The invasive lobular   component has a Baird score of 3+1+1 = 5 with adjacent areas of   lobular carcinoma in situ.  The invasive lobular and LCIS come to within   0.5 mm of the inked lateral margin of excision.  The invasive ductal   carcinoma has a Baird score of 2+1+1 = 4 and has adjacent areas of   ductal carcinoma in situ, cribriform type, low nuclear grade as well as a   0.5 cm area of encapsulated papillary carcinoma (p63 negative for   myoepithelial cells).  The remaining breast tissue shows focal   intraductal papillomata as well as focal microcalcifications. Intradepartmental consultation is obtained. Breast Cancer Marker Studies:     Estrogen Receptors (ER):   -Positive (>10%of cells demonstrate nuclear positivity):   Percentage of cells positive: 90%   Intensity: Strong     Progesterone Receptors (OK):   -Positive:   Percentage of cells positive: 90%   Intensity: Strong     Her-2/lupillo (c-erb B-2) protein expression: Negative (0)       12/22/2020:   FINAL SURGICAL PATHOLOGY REPORT   Diagnosis:   A.  [place] lymph node #1, biopsy: Four (4) reactive nodes, negative for   malignancy. B.  [place] lymph node #2, biopsy: One (1) reactive node, negative for   malignancy. C.  [place] lymph node #3, biopsy: Two (2) reactive nodes, negative for   malignancy. Past Medical History:   Diagnosis Date    Allergic rhinitis     seasonal    Cancer ([facility] [address]     Hypothyroidism        Past Surgical History:   Procedure Laterality Date    BREAST LUMPECTOMY Left 2020    LEFT BREAST NEEDLE LOCALIZED LUMPECTOMY --TRIDENT performed by Raven Stratton MD at [address] [place] [address]  2016    TUBAL LIGATION         Current Outpatient Medications   Medication Sig Dispense Refill    acetaminophen (TYLENOL) 500 MG tablet Take 1 tablet by mouth 4 times daily as needed for Pain 120 tablet 0    ibuprofen (ADVIL;MOTRIN) 600 MG tablet Take 1 tablet by mouth 4 times daily as needed for Pain 40 tablet 0    levothyroxine (SYNTHROID) 50 MCG tablet Take 50 mcg by mouth Daily       No current facility-administered medications for this visit. No Known Allergies    Family History   Problem Relation Age of Onset    Cancer Father 79        prostate cancer    Cancer Brother 54        prostate    Cancer Brother 54        prostate cancer       Social History     Socioeconomic History    Marital status: Unknown     Spouse name: Not on file    Number of children: Not on file    Years of education: Not on file    Highest education level: Not on file   Occupational History    Not on file   Social Needs    Financial resource strain: Not on file    Food insecurity     Worry: Not on file     Inability: Not on file    Transportation needs     Medical: Not on file     Non-medical: Not on file   Tobacco Use    Smoking status: Former Smoker     Last attempt to quit: 2000     Years since quittin.9    Smokeless tobacco: Never Used   Substance and Sexual Activity    Alcohol use:  Yes     Alcohol/week: 4.0 standard drinks     Types: 4 Cans of beer per week     Comment: has drank for years    Drug use: Never    Sexual activity: Not on file   Lifestyle    Physical activity     Days per week: Not on file     Minutes per session: Not on file    Stress: Not on file   Relationships    Social connections     Talks on phone: Not on file     Gets together: Not on file     Attends Hoahaoism service: Not on file     Active member of club or organization: Not on file     Attends meetings of clubs or organizations: Not on file     Relationship status: Not on file    Intimate partner violence     Fear of current or ex partner: Not on file     Emotionally abused: Not on file     Physically abused: Not on file     Forced sexual activity: Not on file   Other Topics Concern    Not on file   Social History Narrative    Not on file     Review of Systems  The patient voices no complaints. With questioning, she denies significant pain, fever, chills, wound drainage or discharge. Objective:   Physical Exam  Well-healed breast and axillary incision. No palpable seroma, erythema or drainage. Assessment:      77 y.o. woman who underwent left needle localized lumpectomy on December 4, 2020 and a left axillary sentinel lymph node biopsy on December 18, 2020 Pathological evaluation completed at [facility]):    Pathology report discussed.       12/4/20 lumpectomy  Diagnosis:   Left breast, lumpectomy: Invasive lobular and invasive ductal carcinoma   with foci of lobular carcinoma in situ, ductal carcinoma in situ,   low-grade, cribriform type and encapsulated papillary carcinoma.  Margins   of excision free of carcinoma.  See comment     Comment:   The lumpectomy specimen contains a mixed invasive lobular   (E-cadherin negative, 1.2 x 0.6 x 0.5 cm) and ductal carcinoma   (E-cadherin positive, 1.5 x 1.3 x 1.1 cm).  The invasive lobular   component has a Waldo score of 3+1+1 = 5 with adjacent areas of   lobular carcinoma in situ.  The invasive lobular and LCIS come to within   0.5 mm of the inked lateral margin of excision.  The invasive ductal   carcinoma has a [place] score of 2+1+1 = 4 and has adjacent areas of   ductal carcinoma in situ, cribriform type, low nuclear grade as well as a   0.5 cm area of encapsulated papillary carcinoma (p63 negative for   myoepithelial cells).  The remaining breast tissue shows focal   intraductal papillomata as well as focal microcalcifications. Intradepartmental consultation is obtained. Breast Cancer Marker Studies:     Estrogen Receptors (ER):   -Positive (>10%of cells demonstrate nuclear positivity):   Percentage of cells positive: 90%   Intensity: Strong     Progesterone Receptors (MO):   -Positive:   Percentage of cells positive: 90%   Intensity: Strong     Her-2/lupillo (c-erb B-2) protein expression: Negative (0)     12/18/20 SLN biopsy   Diagnosis:   A.  [place] lymph node #1, biopsy: Four (4) reactive nodes, negative for   malignancy. B.  [place] lymph node #2, biopsy: One (1) reactive node, negative for   malignancy. C.  [place] lymph node #3, biopsy: Two (2) reactive nodes, negative for   malignancy.     [facility] [place] . We will place referrals to medical and radiation oncology. This visit 6 months with imaging in September 2021. Questions answered to patient and  satisfaction.                                     Plan:      Plan:   1. Patient instructed to keep incision clean and dry well after bathing. Patient can start scar massage once incision is completely healed and strong enough to handle the motion (usually 10 - 14 days post operatively). Rub in a circular motion on and around the scar with firm, even pressure for 5 minutes four times per day. Use lotion or moisturizer to do the scar massage to allow ease with motion over the scar and prevent friction at the area. 2. Continue monthly breast self examination. Bring any changes to your physician's attention. 3. Routine screening imaging in September 2021  4.  Range of motion exercises for left shoulder encouraged. Lymphedema precautions discussed. 5. Education/Lifestyle recommendations: A regular exercise program is encouraged. Avoid excessive caffeine intake. Maintain a diet rich in vegetables and fruits avoiding processed and fast food. 6. Consultations: Oncology appointment will be scheduled with Oncologist of patient's and/or PCP's preference. 7. Continue regular appointments with PCP & Gynecologist.  8. Office follow-up visit should be scheduled in 6  months and PRN. I personally and independently saw and examined patient and reviewed all pertinent laboratory data and imaging studies. I have reviewed and agree with the CNP history and review of systems as documented in the above note. This document is generated, in part, by voice recognition software and thus syntax and grammatical errors are possible. Sugey Diaz MD FACS  Jan. 5, 2021

## 2020-12-11 ENCOUNTER — TELEPHONE (OUTPATIENT)
Dept: SURGERY | Age: 65
End: 2020-12-11

## 2020-12-11 ENCOUNTER — TELEPHONE (OUTPATIENT)
Dept: BREAST CENTER | Age: 65
End: 2020-12-11

## 2020-12-11 NOTE — PROGRESS NOTES
Patient having covid testing at [facility] on 12/14/20. Patient asked to bring ID and to self quarantine until day of procedure.

## 2020-12-11 NOTE — TELEPHONE ENCOUNTER
Patient surgery has been scheduled with surgery scheduling 12/18/20 @ 1:00pm / 11:30am Nuclear / Katina Worthington 10:00am - Left axillary SLN biopsy - General  - [place]op[place]. Patient notified of date and time of surgery. Patient was instructed to enter the [facility] entrance of the hospital. Patient was instructed NPO after midnight the night prior to surgery. Patient was instructed NO ASA products or blood thinners 5-7 days prior to surgery. Patient instruction and post op instructions sheet mailed to patient. Patient was instructed to bring a sports bra with her day of surgery. Patient will be instructed by PAT about Covid 19 test.  Patient will be tested 72-96 hours prior to surgery. No prior authorization.   Post op visit - 1/5/21 @ 10:00am [facility]

## 2020-12-14 ENCOUNTER — HOSPITAL ENCOUNTER (OUTPATIENT)
Age: 65
Discharge: HOME | End: 2020-12-16
Payer: MEDICARE

## 2020-12-14 PROCEDURE — U0003 INFECTIOUS AGENT DETECTION BY NUCLEIC ACID (DNA OR RNA); SEVERE ACUTE RESPIRATORY SYNDROME CORONAVIRUS 2 (SARS-COV-2) (CORONAVIRUS DISEASE [COVID-19]), AMPLIFIED PROBE TECHNIQUE, MAKING USE OF HIGH THROUGHPUT TECHNOLOGIES AS DESCRIBED BY CMS-2020-01-R: HCPCS

## 2020-12-14 RX ORDER — SODIUM CHLORIDE 0.9 % (FLUSH) 0.9 %
10 SYRINGE (ML) INJECTION EVERY 12 HOURS SCHEDULED
Status: CANCELLED | OUTPATIENT
Start: 2020-12-14

## 2020-12-14 RX ORDER — SODIUM CHLORIDE 0.9 % (FLUSH) 0.9 %
10 SYRINGE (ML) INJECTION PRN
Status: CANCELLED | OUTPATIENT
Start: 2020-12-14

## 2020-12-14 RX ORDER — SODIUM CHLORIDE, SODIUM LACTATE, POTASSIUM CHLORIDE, CALCIUM CHLORIDE 600; 310; 30; 20 MG/100ML; MG/100ML; MG/100ML; MG/100ML
INJECTION, SOLUTION INTRAVENOUS CONTINUOUS
Status: CANCELLED | OUTPATIENT
Start: 2020-12-14

## 2020-12-15 NOTE — PROGRESS NOTES
[facility] PRE-ADMISSION TESTING GENERAL INSTRUCTIONS- [facility]-phone number:[phone]    GENERAL INSTRUCTIONS  [x] Antibacterial Soap shower Night before and/or AM of Surgery  [] Tobin wipe instruction sheet and wipes given. [x] Nothing by mouth after midnight, including gum, candy, mints, or water. [x] You may brush your teeth, gargle, but do NOT swallow water. []Hibiclens shower  the night before and the morning of surgery. Do not use             Hibiclens on your face or head. [x]No smoking, chewing tobacco, illegal drugs, or alcohol within 24 hours of your surgery. [x] Jewelry, valuables or body piercing's should not be brought to the hospital. All body and/or tongue piercing's must be removed prior to arriving to hospital.  ALL hair pins must be removed. [x] Do not wear makeup, lotions, powders, deodorant. Nail polish as directed by the nurse. [x] Arrange transportation with a responsible adult  to and from the hospital. If you do not have a responsible adult  to transport you, you will need to make arrangements with a medical transportation company (i.e. [employer]. A Uber/taxi/bus is not appropriate unless you are accompanied by a responsible adult ). Arrange for someone to be with you for the remainder of the day and for 24 hours after your procedure due to having had anesthesia. Who will be your  for transportation?______husband____________   Who will be staying with you for 24 hrs after your procedure? ___husband_______________  [x] Bring insurance card and photo ID.  [] Transfusion Bracelet: Please bring with you to hospital, day of surgery  [] Bring urine specimen day of surgery. Any small container is acceptable. [] Use inhalers the morning of surgery and bring with you to hospital.  [x] Bring copy of living will or healthcare power of  papers to be placed in your electronic record.   [] CPAP/BI-PAP: Please bring your machine if you are to spend the night in the hospital.     PARKING INSTRUCTIONS:   [x] Arrival Time:__1000___________  · [] Parking lot '\"I\"  is located on [facility] (the corner of [facility] and [facility]). To enter, press the button and the gate will lift. A free token will be provided to exit the lot. One car per patient is allowed to park in this lot. All other cars are to park on [address] [place] [address] either in the parking garage or the handicap lot. [x] To reach the [facility] lobby from [address] [place] [address], upon entering the hospital, take elevator B to the 3rd floor. EDUCATION INSTRUCTIONS:      [] Knee or hip replacement booklet & exercise pamphlets given. [] Karin [address] placed in chart. [] Pre-admission Testing educational folder given  [] Incentive Spirometry,coughing & deep breathing exercises reviewed. []Medication information sheet(s)   []Fluoroscopy-Xray used in surgery reviewed with patient. Educational pamphlet placed in chart. [x]Pain: Post-op pain is normal and to be expected. You will be asked to rate your pain from 0-10(a zero is not acceptable-education is needed). Your post-op pain goal is:5[] Ask your nurse for your pain medication. [] Joint camp offered. [] Joint replacement booklets given. [] Other:___________________________    MEDICATION INSTRUCTIONS:   [x]Bring a complete list of your medications, please write the last time you took the medicine, give this list to the nurse. [x] Take the following medications the morning of surgery with 1-2 ounces of water: none[] Stop herbal supplements and vitamins 5 days before your surgery. [] DO NOT take any diabetic medicine the morning of surgery. Follow instructions for insulin the day before surgery. [] If you are diabetic and your blood sugar is low or you feel symptomatic, you may drink 1-2 ounces of apple juice or take a glucose tablet.   The morning of your procedure, you may call the pre-op area if you have concerns about your blood sugar [phone]. [] Use your inhalers the morning of surgery. Bring your emergency inhaler with you day of surgery. [] Follow physician instructions regarding any blood thinners you may be taking. WHAT TO EXPECT:  [x] The day of surgery you will be greeted and checked in by the [employer].  In addition, you will be registered in the [facility] by a Patient Access Representative. Please bring your photo ID and insurance card. A nurse will greet you in accordance to the time you are needed in the pre-op area to prepare you for surgery. Please do not be discouraged if you are not greeted in the order you arrive as there are many variables that are involved in patient preparation. Your patience is greatly appreciated as you wait for your nurse. Please bring in items such as: books, magazines, newspapers, electronics, or any other items  to occupy your time in the waiting area. [x]  Delays may occur with surgery and staff will make a sincere effort to keep you informed of delays. If any delays occur with your procedure, we apologize ahead of time for your inconvenience as we recognize the value of your time. no

## 2020-12-16 ENCOUNTER — TELEPHONE (OUTPATIENT)
Dept: BREAST CENTER | Age: 65
End: 2020-12-16

## 2020-12-16 LAB
SARS-COV-2: NOT DETECTED
SOURCE: NORMAL

## 2020-12-16 NOTE — TELEPHONE ENCOUNTER
Patient is scheduled for surgery 12/18/20 surgery time has been changed for 11:30am / arrival 7:00am / Nuclear 8:45am

## 2020-12-17 ENCOUNTER — TELEPHONE (OUTPATIENT)
Dept: BREAST CENTER | Age: 65
End: 2020-12-17

## 2020-12-17 ENCOUNTER — ANESTHESIA EVENT (OUTPATIENT)
Dept: OPERATING ROOM | Age: 65
End: 2020-12-17
Payer: MEDICARE

## 2020-12-17 NOTE — H&P
Patient ID: Dorota Méndez is a 77 y.o. female. HPI   History and Physical   Patient's Name/Date of Birth: Dorota Méndez / 1955     Dorota Méndez presents for SLN biopsy after  lumpectomy for left breast cancer. ER+ID+ Intracystic papillary carcinoma and atypical lobular hyperplasia, ILC, IDC. PCP: Dr. Nesha Ball    The lesion was located in the upper inner quadrant position of the Left breast. The lesion was discovered by mammogram/exam. The patient has not noted a change in BSE since presentation. Patient denies nipple discharge. Patient denies a personal history of breast cancer. Breast cancer risk factors include gender, age Ashkenazi Taoism Ancestry: No.     OBSTETRIC RELATED HISTORY:   Age of menarche was 13. Age of menopause was 53. Patient denies hormonal therapy. Patient is . Age of first live birth was 30. Patient did not breast feed. Is patient interested in fertility information about fertility preservation? No   CANCER SURVEILLANCE HISTORY:   Mammograms: Yes   Breast MRI's: No   Breast Biopsies: Yes   Colonoscopy: Yes    GI Polyps: Yes   EGD: Yes   Pelvic Exam: Yes   Pap Smear: Yes   Dermatology: No   Lung screening: no   Have you received your flu vaccination this year? Yes   Have you received your Pneumococcal vaccination? Yes   Estimated body mass index is 29.95 kg/m² as calculated from the following:   Height as of this encounter: 5' 5\" (1.651 m). Weight as of this encounter: 180 lb (81.6 kg). Bra Size: 42d   Because violence is so common, we ask all our patients: are you in a relationship or do you live with a person who threatens, hurts, or controls you: denies   Patient drinks moderate caffeinated beverages. Patient does not smoke cigarettes. Patient does not use recreational drugs.  Patient admits to drinking 4 cans beer a day   Past Medical History                                           Past Surgical History                                    Current Facility-Administered Medications                                           No Known Allergies   Family History                                                          Social History                                                                                                                                                                                                                                                                                                                  Occupation: retired. Worked customer service prior. Review of Systems   CONSTITUTIONAL: No fever, chills. Good appetite and energy level. ENMT: Eyes: No diplopia; Nose: No epistaxis. Mouth: No sore throat. RESPIRATORY: No hemoptysis, shortness of breath, cough. CARDIOVASCULAR: No chest pain, pressure, or palpitations. GASTROINTESTINAL: No nausea/vomiting, abdominal pain, diarrhea/constipation. No blood in the stools. GENITOURINARY: No dysuria, urinary frequency, hematuria. NEURO: No syncope, presyncope, headache. Remainder: ROS NEGATIVE   Patient denies previous history of DVT/PE. Review of systems as noted above completely reviewed with patient. No changes. Objective:   Physical Exam   Vitals signs and nursing note reviewed. Exam conducted with a chaperone present. Constitutional:   General: She is not in acute distress. Appearance: She is well-developed. She is not diaphoretic. HENT:   Head: Normocephalic and atraumatic. Eyes:   Conjunctiva/sclera: Conjunctivae normal.   Pupils: Pupils are equal, round, and reactive to light. Neck:   Musculoskeletal: Normal range of motion and neck supple. Thyroid: No thyromegaly. Trachea: No tracheal deviation. Cardiovascular:   Rate and Rhythm: Normal rate and regular rhythm. Heart sounds: Normal heart sounds. Pulmonary:   Effort: Pulmonary effort is normal. No respiratory distress. Breath sounds: Normal breath sounds.    Chest:   Breasts: Breasts are symmetrical.   Right: No inverted nipple, mass, nipple discharge, skin change or tenderness. Left: No inverted nipple, mass, nipple discharge, skin change or tenderness. Abdominal:   General: There is no distension. Palpations: Abdomen is soft. Musculoskeletal:   Right shoulder: She exhibits normal range of motion. Left shoulder: She exhibits normal range of motion. Lymphadenopathy:   Cervical: No cervical adenopathy. Upper Body:   Right upper body: No supraclavicular adenopathy. Left upper body: No supraclavicular adenopathy. Skin:   General: Skin is warm and dry. Coloration: Skin is not pale. Findings: No erythema. Neurological:   Mental Status: She is alert and oriented to person, place, and time. Psychiatric:   Behavior: Behavior normal.   Thought Content: Thought content normal.   Judgment: Judgment normal.     Assessment:   77 y.o. woman who underwent an US guided left breast core biopsy on September 29 , 2020. Pathological evaluation completed at [facility]:   9/1/2020-BILATERAL MAMMOGRAM; [facility]     9/18/2020-ultrasound [address] [place] [address]       9/29/2020; [address] [place] [address]     9/29/2020-Pathology report-[address]     Clinical stage 0 left breast cancer. All imaging reviewed with patient. Performed needle localized lumpectomy for treatment and definitive diagnosis. 12/4/20: pathology:  Left breast, lumpectomy: Invasive lobular and invasive ductal carcinoma with foci of lobular carcinoma in situ, ductal carcinoma in situ, low-grade, cribriform type and encapsulated papillary carcinoma.  Margins of excision free of carcinoma.       Comment:   The lumpectomy specimen contains a mixed invasive lobular   (E-cadherin negative, 1.2 x 0.6 x 0.5 cm) and ductal carcinoma   (E-cadherin positive, 1.5 x 1.3 x 1.1 cm).  The invasive lobular   component has a Waldo score of 3+1+1 = 5 with adjacent areas of   lobular carcinoma in situ.  The invasive lobular and LCIS come to within   0.5 mm of the inked lateral margin of excision.  The invasive ductal   carcinoma has a Waldo score of 2+1+1 = 4 and has adjacent areas of   ductal carcinoma in situ, cribriform type, low nuclear grade as well as a   0.5 cm area of encapsulated papillary carcinoma (p63 negative for   myoepithelial cells).  The remaining breast tissue shows focal   intraductal papillomata as well as focal microcalcifications. Intradepartmental consultation is obtained. Breast Cancer Marker Studies:     Estrogen Receptors (ER):   -Positive (>10%of cells demonstrate nuclear positivity):   Percentage of cells positive: 90%   Intensity: Strong     Progesterone Receptors (SC):   -Positive:   Percentage of cells positive: 90%   Intensity: Strong     Her-2/lupillo (c-erb B-2) protein expression: Negative (0)     Clinical Stage I left breast cancer. Plan: Left axillary sentinel lymph node excision possible axillary dissection. The risks, benefits, expected outcomes, and alternatives to this procedure have been discussed with the patient, which include but are not limited to bleeding, infection, flap necrosis and medical complications to anesthesia or surgery such as pneumonia, heart problems, blood clots, etc. Patient also was told that with minimally invasive procedures adequate margins are not always obtained with the first operation, and she has a >20% chance of requiring a second procedure to obtain clear margins around her tumor. Patient understood and desires to undergo the procedure.

## 2020-12-18 ENCOUNTER — HOSPITAL ENCOUNTER (OUTPATIENT)
Age: 65
Setting detail: OUTPATIENT SURGERY
Discharge: HOME | End: 2020-12-18
Attending: SURGERY | Admitting: SURGERY
Payer: MEDICARE

## 2020-12-18 ENCOUNTER — ANESTHESIA (OUTPATIENT)
Dept: OPERATING ROOM | Age: 65
End: 2020-12-18
Payer: MEDICARE

## 2020-12-18 ENCOUNTER — HOSPITAL ENCOUNTER (OUTPATIENT)
Dept: NUCLEAR MEDICINE | Age: 65
Discharge: HOME | End: 2020-12-20
Payer: MEDICARE

## 2020-12-18 VITALS
DIASTOLIC BLOOD PRESSURE: 65 MMHG | TEMPERATURE: 98 F | OXYGEN SATURATION: 98 % | HEIGHT: 65 IN | WEIGHT: 180 LBS | RESPIRATION RATE: 16 BRPM | SYSTOLIC BLOOD PRESSURE: 135 MMHG | BODY MASS INDEX: 29.99 KG/M2 | HEART RATE: 50 BPM

## 2020-12-18 VITALS — TEMPERATURE: 90.9 F | SYSTOLIC BLOOD PRESSURE: 106 MMHG | DIASTOLIC BLOOD PRESSURE: 58 MMHG | OXYGEN SATURATION: 94 %

## 2020-12-18 LAB
HCT VFR BLD CALC: 41.9 % (ref 34–48)
HEMOGLOBIN: 13.6 G/DL (ref 11.5–15.5)
MCH RBC QN AUTO: 30.4 PG (ref 26–35)
MCHC RBC AUTO-ENTMCNC: 32.5 % (ref 32–34.5)
MCV RBC AUTO: 93.7 FL (ref 80–99.9)
PDW BLD-RTO: 12.6 FL (ref 11.5–15)
PLATELET # BLD: 268 E9/L (ref 130–450)
PMV BLD AUTO: 9.9 FL (ref 7–12)
RBC # BLD: 4.47 E12/L (ref 3.5–5.5)
WBC # BLD: 5.2 E9/L (ref 4.5–11.5)

## 2020-12-18 PROCEDURE — 3600000013 HC SURGERY LEVEL 3 ADDTL 15MIN: Performed by: SURGERY

## 2020-12-18 PROCEDURE — 7100000001 HC PACU RECOVERY - ADDTL 15 MIN: Performed by: SURGERY

## 2020-12-18 PROCEDURE — 6360000002 HC RX W HCPCS

## 2020-12-18 PROCEDURE — 7100000010 HC PHASE II RECOVERY - FIRST 15 MIN: Performed by: SURGERY

## 2020-12-18 PROCEDURE — 2580000003 HC RX 258: Performed by: SURGERY

## 2020-12-18 PROCEDURE — A9520 TC99 TILMANOCEPT DIAG 0.5MCI: HCPCS | Performed by: RADIOLOGY

## 2020-12-18 PROCEDURE — 3600000003 HC SURGERY LEVEL 3 BASE: Performed by: SURGERY

## 2020-12-18 PROCEDURE — 7100000000 HC PACU RECOVERY - FIRST 15 MIN: Performed by: SURGERY

## 2020-12-18 PROCEDURE — 2720000010 HC SURG SUPPLY STERILE: Performed by: SURGERY

## 2020-12-18 PROCEDURE — 2500000003 HC RX 250 WO HCPCS

## 2020-12-18 PROCEDURE — 38792 RA TRACER ID OF SENTINL NODE: CPT

## 2020-12-18 PROCEDURE — 7100000011 HC PHASE II RECOVERY - ADDTL 15 MIN: Performed by: SURGERY

## 2020-12-18 PROCEDURE — 6360000002 HC RX W HCPCS: Performed by: SURGERY

## 2020-12-18 PROCEDURE — 85027 COMPLETE CBC AUTOMATED: CPT

## 2020-12-18 PROCEDURE — 3700000001 HC ADD 15 MINUTES (ANESTHESIA): Performed by: SURGERY

## 2020-12-18 PROCEDURE — 88307 TISSUE EXAM BY PATHOLOGIST: CPT

## 2020-12-18 PROCEDURE — 3700000000 HC ANESTHESIA ATTENDED CARE: Performed by: SURGERY

## 2020-12-18 PROCEDURE — 2500000003 HC RX 250 WO HCPCS: Performed by: SURGERY

## 2020-12-18 PROCEDURE — 3430000000 HC RX DIAGNOSTIC RADIOPHARMACEUTICAL: Performed by: RADIOLOGY

## 2020-12-18 PROCEDURE — 2709999900 HC NON-CHARGEABLE SUPPLY: Performed by: SURGERY

## 2020-12-18 PROCEDURE — 36415 COLL VENOUS BLD VENIPUNCTURE: CPT

## 2020-12-18 RX ORDER — MORPHINE SULFATE 2 MG/ML
2 INJECTION, SOLUTION INTRAMUSCULAR; INTRAVENOUS EVERY 5 MIN PRN
Status: DISCONTINUED | OUTPATIENT
Start: 2020-12-18 | End: 2020-12-18 | Stop reason: HOSPADM

## 2020-12-18 RX ORDER — EPHEDRINE SULFATE/0.9% NACL/PF 50 MG/5 ML
SYRINGE (ML) INTRAVENOUS PRN
Status: DISCONTINUED | OUTPATIENT
Start: 2020-12-18 | End: 2020-12-18 | Stop reason: SDUPTHER

## 2020-12-18 RX ORDER — PHENYLEPHRINE HCL IN 0.9% NACL 1 MG/10 ML
SYRINGE (ML) INTRAVENOUS PRN
Status: DISCONTINUED | OUTPATIENT
Start: 2020-12-18 | End: 2020-12-18 | Stop reason: SDUPTHER

## 2020-12-18 RX ORDER — FENTANYL CITRATE 50 UG/ML
INJECTION, SOLUTION INTRAMUSCULAR; INTRAVENOUS PRN
Status: DISCONTINUED | OUTPATIENT
Start: 2020-12-18 | End: 2020-12-18 | Stop reason: SDUPTHER

## 2020-12-18 RX ORDER — MORPHINE SULFATE 2 MG/ML
1 INJECTION, SOLUTION INTRAMUSCULAR; INTRAVENOUS EVERY 5 MIN PRN
Status: DISCONTINUED | OUTPATIENT
Start: 2020-12-18 | End: 2020-12-18 | Stop reason: HOSPADM

## 2020-12-18 RX ORDER — MIDAZOLAM HYDROCHLORIDE 1 MG/ML
INJECTION INTRAMUSCULAR; INTRAVENOUS PRN
Status: DISCONTINUED | OUTPATIENT
Start: 2020-12-18 | End: 2020-12-18 | Stop reason: SDUPTHER

## 2020-12-18 RX ORDER — LIDOCAINE HYDROCHLORIDE 20 MG/ML
INJECTION, SOLUTION INTRAVENOUS PRN
Status: DISCONTINUED | OUTPATIENT
Start: 2020-12-18 | End: 2020-12-18 | Stop reason: SDUPTHER

## 2020-12-18 RX ORDER — PROMETHAZINE HYDROCHLORIDE 25 MG/ML
6.25 INJECTION, SOLUTION INTRAMUSCULAR; INTRAVENOUS
Status: DISCONTINUED | OUTPATIENT
Start: 2020-12-18 | End: 2020-12-18 | Stop reason: HOSPADM

## 2020-12-18 RX ORDER — OXYCODONE HYDROCHLORIDE AND ACETAMINOPHEN 5; 325 MG/1; MG/1
1 TABLET ORAL
Status: DISCONTINUED | OUTPATIENT
Start: 2020-12-18 | End: 2020-12-18 | Stop reason: HOSPADM

## 2020-12-18 RX ORDER — KETOROLAC TROMETHAMINE 30 MG/ML
INJECTION, SOLUTION INTRAMUSCULAR; INTRAVENOUS PRN
Status: DISCONTINUED | OUTPATIENT
Start: 2020-12-18 | End: 2020-12-18 | Stop reason: SDUPTHER

## 2020-12-18 RX ORDER — ONDANSETRON 2 MG/ML
INJECTION INTRAMUSCULAR; INTRAVENOUS PRN
Status: DISCONTINUED | OUTPATIENT
Start: 2020-12-18 | End: 2020-12-18 | Stop reason: SDUPTHER

## 2020-12-18 RX ORDER — SODIUM CHLORIDE 0.9 % (FLUSH) 0.9 %
10 SYRINGE (ML) INJECTION PRN
Status: DISCONTINUED | OUTPATIENT
Start: 2020-12-18 | End: 2020-12-18 | Stop reason: HOSPADM

## 2020-12-18 RX ORDER — SODIUM CHLORIDE 9 MG/ML
INJECTION INTRAVENOUS PRN
Status: DISCONTINUED | OUTPATIENT
Start: 2020-12-18 | End: 2020-12-18 | Stop reason: ALTCHOICE

## 2020-12-18 RX ORDER — SODIUM CHLORIDE 0.9 % (FLUSH) 0.9 %
10 SYRINGE (ML) INJECTION EVERY 12 HOURS SCHEDULED
Status: DISCONTINUED | OUTPATIENT
Start: 2020-12-18 | End: 2020-12-18 | Stop reason: HOSPADM

## 2020-12-18 RX ORDER — PROPOFOL 10 MG/ML
INJECTION, EMULSION INTRAVENOUS PRN
Status: DISCONTINUED | OUTPATIENT
Start: 2020-12-18 | End: 2020-12-18 | Stop reason: SDUPTHER

## 2020-12-18 RX ORDER — CEFAZOLIN SODIUM 1 G/3ML
INJECTION, POWDER, FOR SOLUTION INTRAMUSCULAR; INTRAVENOUS PRN
Status: DISCONTINUED | OUTPATIENT
Start: 2020-12-18 | End: 2020-12-18 | Stop reason: SDUPTHER

## 2020-12-18 RX ORDER — METHYLENE BLUE 10 MG/ML
INJECTION INTRAVENOUS PRN
Status: DISCONTINUED | OUTPATIENT
Start: 2020-12-18 | End: 2020-12-18 | Stop reason: ALTCHOICE

## 2020-12-18 RX ORDER — SODIUM CHLORIDE, SODIUM LACTATE, POTASSIUM CHLORIDE, CALCIUM CHLORIDE 600; 310; 30; 20 MG/100ML; MG/100ML; MG/100ML; MG/100ML
INJECTION, SOLUTION INTRAVENOUS CONTINUOUS
Status: DISCONTINUED | OUTPATIENT
Start: 2020-12-18 | End: 2020-12-18 | Stop reason: HOSPADM

## 2020-12-18 RX ORDER — ONDANSETRON 2 MG/ML
4 INJECTION INTRAMUSCULAR; INTRAVENOUS
Status: DISCONTINUED | OUTPATIENT
Start: 2020-12-18 | End: 2020-12-18 | Stop reason: HOSPADM

## 2020-12-18 RX ORDER — MEPERIDINE HYDROCHLORIDE 25 MG/ML
12.5 INJECTION INTRAMUSCULAR; INTRAVENOUS; SUBCUTANEOUS EVERY 5 MIN PRN
Status: DISCONTINUED | OUTPATIENT
Start: 2020-12-18 | End: 2020-12-18 | Stop reason: HOSPADM

## 2020-12-18 RX ADMIN — FENTANYL CITRATE 100 MCG: 50 INJECTION, SOLUTION INTRAMUSCULAR; INTRAVENOUS at 09:58

## 2020-12-18 RX ADMIN — FENTANYL CITRATE 25 MCG: 50 INJECTION, SOLUTION INTRAMUSCULAR; INTRAVENOUS at 10:37

## 2020-12-18 RX ADMIN — ONDANSETRON HYDROCHLORIDE 4 MG: 2 INJECTION, SOLUTION INTRAMUSCULAR; INTRAVENOUS at 11:14

## 2020-12-18 RX ADMIN — Medication 5 MG: at 10:11

## 2020-12-18 RX ADMIN — Medication 50 MCG: at 10:31

## 2020-12-18 RX ADMIN — SODIUM CHLORIDE, POTASSIUM CHLORIDE, SODIUM LACTATE AND CALCIUM CHLORIDE: 600; 310; 30; 20 INJECTION, SOLUTION INTRAVENOUS at 10:30

## 2020-12-18 RX ADMIN — FENTANYL CITRATE 25 MCG: 50 INJECTION, SOLUTION INTRAMUSCULAR; INTRAVENOUS at 10:53

## 2020-12-18 RX ADMIN — SODIUM CHLORIDE, POTASSIUM CHLORIDE, SODIUM LACTATE AND CALCIUM CHLORIDE: 600; 310; 30; 20 INJECTION, SOLUTION INTRAVENOUS at 07:50

## 2020-12-18 RX ADMIN — Medication 5 MG: at 10:06

## 2020-12-18 RX ADMIN — FENTANYL CITRATE 25 MCG: 50 INJECTION, SOLUTION INTRAMUSCULAR; INTRAVENOUS at 10:35

## 2020-12-18 RX ADMIN — CEFAZOLIN 2000 MG: 1 INJECTION, POWDER, FOR SOLUTION INTRAMUSCULAR; INTRAVENOUS at 10:11

## 2020-12-18 RX ADMIN — MIDAZOLAM 1 MG: 1 INJECTION INTRAMUSCULAR; INTRAVENOUS at 09:50

## 2020-12-18 RX ADMIN — KETOROLAC TROMETHAMINE 30 MG: 30 INJECTION, SOLUTION INTRAMUSCULAR; INTRAVENOUS at 11:24

## 2020-12-18 RX ADMIN — TILMANOCEPT 0.52 MILLICURIE: KIT at 09:19

## 2020-12-18 RX ADMIN — LIDOCAINE HYDROCHLORIDE 60 MG: 20 INJECTION, SOLUTION INTRAVENOUS at 09:58

## 2020-12-18 RX ADMIN — PROPOFOL 200 MG: 10 INJECTION, EMULSION INTRAVENOUS at 09:58

## 2020-12-18 RX ADMIN — FENTANYL CITRATE 25 MCG: 50 INJECTION, SOLUTION INTRAMUSCULAR; INTRAVENOUS at 11:06

## 2020-12-18 ASSESSMENT — PULMONARY FUNCTION TESTS
PIF_VALUE: 17
PIF_VALUE: 20
PIF_VALUE: 17
PIF_VALUE: 2
PIF_VALUE: 17
PIF_VALUE: 20
PIF_VALUE: 17
PIF_VALUE: 20
PIF_VALUE: 2
PIF_VALUE: 18
PIF_VALUE: 17
PIF_VALUE: 20
PIF_VALUE: 17
PIF_VALUE: 20
PIF_VALUE: 17
PIF_VALUE: 10
PIF_VALUE: 2
PIF_VALUE: 20
PIF_VALUE: 17
PIF_VALUE: 17
PIF_VALUE: 1
PIF_VALUE: 20
PIF_VALUE: 17
PIF_VALUE: 20
PIF_VALUE: 17
PIF_VALUE: 2
PIF_VALUE: 2
PIF_VALUE: 17
PIF_VALUE: 18
PIF_VALUE: 17
PIF_VALUE: 1
PIF_VALUE: 17
PIF_VALUE: 22
PIF_VALUE: 17
PIF_VALUE: 11
PIF_VALUE: 1
PIF_VALUE: 20
PIF_VALUE: 13
PIF_VALUE: 5
PIF_VALUE: 17
PIF_VALUE: 2
PIF_VALUE: 20
PIF_VALUE: 2
PIF_VALUE: 10
PIF_VALUE: 17
PIF_VALUE: 17
PIF_VALUE: 13
PIF_VALUE: 17
PIF_VALUE: 2
PIF_VALUE: 2
PIF_VALUE: 20
PIF_VALUE: 2
PIF_VALUE: 18
PIF_VALUE: 1
PIF_VALUE: 17
PIF_VALUE: 17
PIF_VALUE: 13
PIF_VALUE: 18
PIF_VALUE: 2
PIF_VALUE: 0
PIF_VALUE: 17
PIF_VALUE: 2
PIF_VALUE: 17
PIF_VALUE: 17
PIF_VALUE: 2
PIF_VALUE: 18
PIF_VALUE: 17
PIF_VALUE: 17
PIF_VALUE: 2
PIF_VALUE: 2
PIF_VALUE: 18
PIF_VALUE: 20
PIF_VALUE: 21
PIF_VALUE: 17
PIF_VALUE: 13
PIF_VALUE: 11
PIF_VALUE: 17
PIF_VALUE: 2
PIF_VALUE: 17
PIF_VALUE: 17
PIF_VALUE: 13
PIF_VALUE: 17
PIF_VALUE: 1
PIF_VALUE: 1
PIF_VALUE: 17
PIF_VALUE: 13
PIF_VALUE: 17
PIF_VALUE: 2
PIF_VALUE: 17
PIF_VALUE: 17

## 2020-12-18 ASSESSMENT — PAIN - FUNCTIONAL ASSESSMENT: PAIN_FUNCTIONAL_ASSESSMENT: 0-10

## 2020-12-18 ASSESSMENT — PAIN SCALES - GENERAL
PAINLEVEL_OUTOF10: 0
PAINLEVEL_OUTOF10: 0

## 2020-12-18 NOTE — PROGRESS NOTES
Admitted to Same Day Surgery. Preop instructions given to patient. COVID testing completed on: 12/14/20  Results of the test: not detected  The patient verbally confirms that they did adhere to the self-quarantine guidelines. No signs or symptoms expressed or observed.

## 2020-12-18 NOTE — OP NOTE
Operative Note      Patient: Kimberly Sullivan  YOB: 1955  MRN: [id number]    Date of Procedure: 12/18/2020    Pre-Op Diagnosis: BREAST CANCER    Post-Op Diagnosis: Same       Procedure(s):  LEFT AXILLARY SENTINEL LYMPH NODE BIOPSY--NEOPROBE    Surgeon(s):  Briana Powell MD    Assistant:   Resident: Estuardo Cantor DO    Anesthesia: General    Estimated Blood Loss (mL): less than 50     Complications: None    Specimens:   ID Type Source Tests Collected by Time Destination   A : SENTINEL LYMPH NODE 1 - 99 Tissue Tissue SURGICAL PATHOLOGY Briana Powell MD 12/18/2020 1100    B : SENTINEL NODE 2 - 44  Tissue Tissue SURGICAL PATHOLOGY Briana Powell MD 12/18/2020 1103    C : [address] Briana Powell MD 12/18/2020 1106        Implants:  * No implants in log *      Drains: * No LDAs found *    Findings: [place] lymph node cluster, maximal reading of 99 by gamma probe    Detailed Description of Procedure: The patient was brought to the operating room and positioned supine on the OR table. Sequential compression devices were placed on the patient's lower extremities and functioning. Preoperative antibiotics were administered. Anesthesia was obtained without complication as per the anesthesia record. Immediately prior to the procedure a time-out was called and the surgical checklist was reviewed and agreed upon by all present. The patient was prepped and draped in the usual sterile fashion and the procedure went forth with strict aseptic technique under maximal barrier precautions. An incision was then made following skin lines in the Left axilla. Careful blunt dissection was carried out guided by the gamma probe, as the patient had previously had radioactive tracer injection. An area of adenopathy was identified, and noted to register on the gamma probe. Efferent and afferent lymphovascular channels were clipped with stainless steel surgical clips.  The sentinel node was noted to register 99 on the gamma probe. An additional node was excised noted to register 39 by gamma probe. A third node was excised and noted to register 59 by gamma probe. There were no additional areas of palpable adenopathy, no visible blue dye, and no gamma probe readings greater that 10% of the sentinel node level. The wound was copiously irrigated. Bleeding points were cauterized. The dermis was approximated with interrupted 4-0 Vicryl sutures and the skin was approximated with a running subcuticular 5-0 Vicryl suture. The incisions were cleaned and dried, and steri-strips were applied. Needle, sponge, and instrument counts were reported as correct x2. The patient tolerated the procedure well without complications. DISPOSITION: Anesthesia was discontinued and the patient was extubated prior to leaving the OR. She was transferred to the recovery area in good condition. Discharge to home is expected following appropriate post-anesthesia recovery. Dr. Poornima Rosales was present and scrubbed throughout the case.     Electronically signed by Amber Panda DO on 12/18/2020 at 11:35 AM

## 2020-12-18 NOTE — ANESTHESIA PRE PROCEDURE
Department of Anesthesiology  Preprocedure Note       Name:  Irene Vasquez   Age:  77 y.o.  :  1955                                          MRN:  [id number]         Date:  2020      Surgeon: Mynor Disla):  Sanjuana Almazan MD    Procedure: Procedure(s):  LEFT AXILLARY SENTINEL LYMPH NODE BIOPSY--NEOPROBE    Medications prior to admission:   Prior to Admission medications    Medication Sig Start Date End Date Taking?  Authorizing Provider   acetaminophen (TYLENOL) 500 MG tablet Take 1 tablet by mouth 4 times daily as needed for Pain 20  Yes Wilman Prather DO   ibuprofen (ADVIL;MOTRIN) 600 MG tablet Take 1 tablet by mouth 4 times daily as needed for Pain 20  Yes Wilman Prather DO   levothyroxine (SYNTHROID) 50 MCG tablet Take 50 mcg by mouth Daily   Yes Historical Provider, MD       Current medications:    Current Facility-Administered Medications   Medication Dose Route Frequency Provider Last Rate Last Admin    lactated ringers infusion   Intravenous Continuous Sanjuana Almazan  mL/hr at 20 0750 New Bag at 20 0750    sodium chloride flush 0.9 % injection 10 mL  10 mL Intravenous 2 times per day Sanjuana Almazan MD        sodium chloride flush 0.9 % injection 10 mL  10 mL Intravenous PRN Sanjuana Almazan MD           Allergies:  No Known Allergies    Problem List:    Patient Active Problem List   Diagnosis Code    Cancer ([facility] C80.1    COVID-19 U07.1       Past Medical History:        Diagnosis Date    Allergic rhinitis     seasonal    Cancer ([facility]     Hypothyroidism        Past Surgical History:        Procedure Laterality Date    BREAST LUMPECTOMY Left 2020    LEFT BREAST NEEDLE LOCALIZED LUMPECTOMY --TRIDENT performed by Sanjuana Almazan MD at [address] [place] [address]  2016    TUBAL LIGATION         Social History:    Social History     Tobacco Use    Smoking status: Former Smoker     Quit date: 2000     Years since quittin.9    Smokeless tobacco: Never Used Substance Use Topics    Alcohol use: Yes     Alcohol/week: 4.0 standard drinks     Types: 4 Cans of beer per week     Comment: has drank for years                                Counseling given: Not Answered      Vital Signs (Current):   Vitals:    12/15/20 1459 12/18/20 0728   BP:  (!) 185/88   Pulse:  60   Resp:  14   Temp:  36.7 °C (98 °F)   TempSrc:  Temporal   SpO2:  98%   Weight: 180 lb (81.6 kg) 180 lb (81.6 kg)   Height: 5' 5\" (1.651 m) 5' 5\" (1.651 m)                                              BP Readings from Last 3 Encounters:   12/18/20 (!) 185/88   12/04/20 131/72   12/04/20 (!) 152/76       NPO Status: Time of last liquid consumption: 2100                        Time of last solid consumption: 1800                        Date of last liquid consumption: 12/17/20                        Date of last solid food consumption: 12/17/20    BMI:   Wt Readings from Last 3 Encounters:   12/18/20 180 lb (81.6 kg)   12/04/20 180 lb (81.6 kg)   11/09/20 180 lb (81.6 kg)     Body mass index is 29.95 kg/m². CBC: No results found for: WBC, RBC, HGB, HCT, MCV, RDW, PLT    CMP: No results found for: NA, K, CL, CO2, BUN, CREATININE, GFRAA, AGRATIO, LABGLOM, GLUCOSE, PROT, CALCIUM, BILITOT, ALKPHOS, AST, ALT    POC Tests: No results for input(s): POCGLU, POCNA, POCK, POCCL, POCBUN, POCHEMO, POCHCT in the last 72 hours.     Coags: No results found for: PROTIME, INR, APTT    HCG (If Applicable): No results found for: PREGTESTUR, PREGSERUM, HCG, HCGQUANT     ABGs: No results found for: PHART, PO2ART, SSL0CYA, YLU9TQM, BEART, K9ELWVMT     Type & Screen (If Applicable):  No results found for: LABABO, LABRH    Drug/Infectious Status (If Applicable):  No results found for: HIV, HEPCAB    COVID-19 Screening (If Applicable):   Lab Results   Component Value Date    COVID19 Not Detected 12/14/2020     CXR 11/9/2020  No acute process    Anesthesia Evaluation  Patient summary reviewed and Nursing notes reviewed

## 2020-12-18 NOTE — TELEPHONE ENCOUNTER
Called to let her know that surgery has been moved up and she should be at [facility] by 6:30 am .

## 2020-12-22 NOTE — ANESTHESIA POSTPROCEDURE EVALUATION
Department of Anesthesiology  Postprocedure Note    Patient: Roberth Ragland  MRN: [id number]  YOB: 1955  Date of evaluation: 12/21/2020  Time:  7:50 PM     Procedure Summary     Date: 12/18/20 Room / Location: [place] [address] / [facility]    Anesthesia Start: 9471 Anesthesia Stop: 1884    Procedure: LEFT AXILLARY SENTINEL LYMPH NODE BIOPSY--NEOPROBE (Left ) Diagnosis: (BREAST CANCER)    Surgeons: Brenda Amaro MD Responsible Provider: Nabila Wilcox DO    Anesthesia Type: general ASA Status: 3          Anesthesia Type: general    Romulo Phase I: Romulo Score: 10    Romulo Phase II: Romulo Score: 10    Last vitals: Reviewed and per EMR flowsheets.        Anesthesia Post Evaluation    Patient location during evaluation: PACU  Patient participation: complete - patient participated  Level of consciousness: awake and alert  Pain score: 1  Airway patency: patent  Nausea & Vomiting: no nausea and no vomiting  Complications: no  Cardiovascular status: hemodynamically stable  Respiratory status: acceptable  Hydration status: euvolemic [facility] Visit Report    Kye Aranda  [id number]  10/16/2018    Admission R/T [facility] Dx: yes    Reason for [facility] Admission:    Symptom  Management: Pain Control    Nursing/Medication Recommendations:    Psychosocial Issues and Recommendations:    Spiritual Concerns and Recommendations:    [facility] Discharge Plans:  incomplete; patient remains [facility] and [facility] will round daily    Review of Visit (Include All Collaboration- including names of hospital and family involved during admission/visit):  patietn resting in bed with spouse at the bedside. patient awake alert and opriented awaiting paoin meds. patient just moved to room from [facility] for pallative care. facility rn aware of patient pain and is attempting to get meds. family very appreciative of staff at [facility] as well as [facility] staff. patinet stay covered by [facility] effective today due to pallative care only. no plans to discharge at this time will cont to visit daily to assess needs and offer support       Jada Jerry RN

## 2020-12-23 ENCOUNTER — TELEPHONE (OUTPATIENT)
Dept: SURGERY | Age: 65
End: 2020-12-23

## 2021-01-05 ENCOUNTER — OFFICE VISIT (OUTPATIENT)
Dept: BREAST CENTER | Age: 66
End: 2021-01-05
Payer: MEDICARE

## 2021-01-05 VITALS
SYSTOLIC BLOOD PRESSURE: 120 MMHG | RESPIRATION RATE: 18 BRPM | DIASTOLIC BLOOD PRESSURE: 68 MMHG | BODY MASS INDEX: 29.99 KG/M2 | HEIGHT: 65 IN | HEART RATE: 54 BPM | WEIGHT: 180 LBS | TEMPERATURE: 97.8 F | OXYGEN SATURATION: 99 %

## 2021-01-05 DIAGNOSIS — C50.912 MALIGNANT NEOPLASM OF LEFT FEMALE BREAST, UNSPECIFIED ESTROGEN RECEPTOR STATUS, UNSPECIFIED SITE OF BREAST (HCC): Primary | ICD-10-CM

## 2021-01-05 DIAGNOSIS — Z12.31 VISIT FOR SCREENING MAMMOGRAM: Primary | ICD-10-CM

## 2021-01-05 PROCEDURE — 99024 POSTOP FOLLOW-UP VISIT: CPT | Performed by: SURGERY

## 2021-01-05 NOTE — Clinical Note
Med/RadOnc referrals. Please see if they can be scheduled on the same day since they live in [facility].

## 2021-01-05 NOTE — LETTER
[address] [address]  Phone: [phone]  Fax: [phone]    Beena Carmichael MD        [birth year]00     DAJA MALAGON, [place] [address]    Patient: Troy Moura  MR Number: <[id number]>  YOB: 1955  Date of Visit: 1/5/2021    Dear Dr. DAJA MALAGON:    Thank you for the request for consultation for Troy Moura to me for the evaluation and management of her left breast cancer. Below are the relevant portions of my assessment and plan of care. 77 y.o. woman who underwent left needle localized lumpectomy on December 4, 2020 and a left axillary sentinel lymph node biopsy on December 18, 2020 Pathological evaluation completed at [facility] ([facility]):    Pathology report discussed. 12/10/2020:Pathology       Diagnosis:   Left breast, lumpectomy: Invasive lobular and invasive ductal carcinoma   with foci of lobular carcinoma in situ, ductal carcinoma in situ,   low-grade, cribriform type and encapsulated papillary carcinoma.  Margins   of excision free of carcinoma.  See comment     Comment:   The lumpectomy specimen contains a mixed invasive lobular   (E-cadherin negative, 1.2 x 0.6 x 0.5 cm) and ductal carcinoma   (E-cadherin positive, 1.5 x 1.3 x 1.1 cm).  The invasive lobular   component has a Universal score of 3+1+1 = 5 with adjacent areas of   lobular carcinoma in situ.  The invasive lobular and LCIS come to within   0.5 mm of the inked lateral margin of excision.  The invasive ductal   carcinoma has a Waldo score of 2+1+1 = 4 and has adjacent areas of   ductal carcinoma in situ, cribriform type, low nuclear grade as well as a   0.5 cm area of encapsulated papillary carcinoma (p63 negative for   myoepithelial cells).  The remaining breast tissue shows focal   intraductal papillomata as well as focal microcalcifications. Intradepartmental consultation is obtained. Breast Cancer Marker Studies:     Estrogen Receptors (ER):   -Positive (>10%of cells demonstrate nuclear positivity):   Percentage of cells positive: 90%   Intensity: Strong   Progesterone Receptors (IA):   -Positive:   Percentage of cells positive: 90%   Intensity: Strong   Her-2/lupillo (c-erb B-2) protein expression: Negative (0)             12/22/2020:   FINAL SURGICAL PATHOLOGY REPORT   Diagnosis:   A.  [place] lymph node #1, biopsy: Four (4) reactive nodes, negative for   malignancy. B.  [place] lymph node #2, biopsy: One (1) reactive node, negative for   malignancy. C.  [place] lymph node #3, biopsy: Two (2) reactive nodes, negative for   malignancy.     [facility] [place] . We will place referrals to medical and radiation oncology. This visit 6 months with imaging in September 2021. Questions answered to patient and  satisfaction.     If you have questions, please do not hesitate to call me. I look forward to following Gina Hyman along with you.     Sincerely,  Remedios Wei MD

## 2021-01-11 ENCOUNTER — HOSPITAL ENCOUNTER (OUTPATIENT)
Dept: RADIATION ONCOLOGY | Age: 66
Discharge: HOME | End: 2021-01-11
Payer: MEDICARE

## 2021-01-11 VITALS
WEIGHT: 182.3 LBS | DIASTOLIC BLOOD PRESSURE: 86 MMHG | TEMPERATURE: 96.8 F | RESPIRATION RATE: 18 BRPM | HEART RATE: 85 BPM | SYSTOLIC BLOOD PRESSURE: 144 MMHG | BODY MASS INDEX: 30.34 KG/M2 | OXYGEN SATURATION: 98 %

## 2021-01-11 DIAGNOSIS — C80.1 CANCER (HCC): Primary | ICD-10-CM

## 2021-01-11 PROCEDURE — 99205 OFFICE O/P NEW HI 60 MIN: CPT | Performed by: RADIOLOGY

## 2021-01-11 PROCEDURE — 99205 OFFICE O/P NEW HI 60 MIN: CPT

## 2021-01-11 NOTE — PROGRESS NOTES
Radiation Oncology      Ken Argueta. Latasha Bowling 48      Referring Physician: Dr. Lexis Malhotra      Primary Care Physician:JO [address] [place] [address],    Primary Oncologist: pending      Diagnosis: pT1 left breast cancer   -ER+MS+ Intracystic papillary carcinoma      Service:  Radiation Oncology consultation performed on 1/11/20        HPI:        Lori Moran is a pleasant 77year old with a recent CS for an invasive papillary carcinoma and SLND (2nd procedure). On 9/1/2020 pt had mammogram and then 9/18/2020 US guided Left breast core biopsy showing intracystic papillary carcinoma and atypical lobular hyperplasia. 12/14/2020 Needle lumpectomy Invasive Lobular and invasive ductal with foci of LCIS, DCIS, with negative margins and 12/18/2020 left axillary LN biopsy -7 LN neg. Lifestyle modification reviewed. The patient presents today to discuss fractionated external beam radiation therapy as a component of multidisciplinary, adjuvant management. We reviewed the available medical records including the complete medical history of this pt today prior to consultation. Epic -CE and available scanned documents per the Epic Media tab were reviewed PRN. A complete ROS was also performed today and is noted below. During consultation today I personally discussed the pts workup to date; including but not limited to applicable imaging studies, Pathology reports, and interventions. The NCCN guidelines, as pertaining to the above diagnosis were also recapped for the pt today in brief. Today, Lupe Fermin  notes Sx that include soreness. KPS 80-90 (BL).          -----  SARS-CoV-2:    Pt afebrile and asymptomatic      CDC NOV 2020: http://www.best.com/. html:    The likelihood of recovering replication-competent virus also declines after onset of symptoms.  For patients with mild to moderate COVID-19, replication-competent virus has not been recovered after 10 days following symptom onset (CDC, unpublished data, 2020; Aminata Kilgore et al., 2020; Shant et al., 2020; Baljinder et al., 2020; Salma et al., 3041; personal communication with Yusuf Pulido., 2020; [facility], 2020).       -----  Per [address]:    pending    -----    BCS and SLNB reviewed    -----      Past Medical History:   Diagnosis Date    Allergic rhinitis     seasonal    Cancer ([facility] [address]     Hypothyroidism        Past Surgical History:   Procedure Laterality Date    BREAST BIOPSY Left 12/18/2020    LEFT AXILLARY SENTINEL LYMPH NODE BIOPSY--NEOPROBE performed by Eber Mari MD at [place] [address] BREAST LUMPECTOMY Left 12/4/2020    LEFT BREAST NEEDLE LOCALIZED LUMPECTOMY --TRIDENT performed by Eber Mari MD at [place] [address] COLONOSCOPY  2016    TUBAL LIGATION         Family History   Problem Relation Age of Onset    Cancer Father 79        prostate cancer    Cancer Brother 54        prostate    Cancer Brother 54        prostate cancer       Current Outpatient Medications   Medication Sig Dispense Refill    acetaminophen (TYLENOL) 500 MG tablet Take 1 tablet by mouth 4 times daily as needed for Pain 120 tablet 0    ibuprofen (ADVIL;MOTRIN) 600 MG tablet Take 1 tablet by mouth 4 times daily as needed for Pain 40 tablet 0    levothyroxine (SYNTHROID) 50 MCG tablet Take 50 mcg by mouth Daily       No current facility-administered medications for this encounter.         No Known Allergies      Social History     Socioeconomic History    Marital status:      Spouse name: None    Number of children: 2    Years of education: None    Highest education level: None   Occupational History    Occupation: [address] Needs    Financial resource strain: None    Food insecurity     Worry: None     Inability: None    Transportation needs     Medical: None     Non-medical: None   Tobacco Use    Smoking status: Former Smoker     Quit date: 2000     Years since quittin.0    Smokeless tobacco: Never Used   Substance and Sexual Activity    Alcohol use: Yes     Alcohol/week: 4.0 standard drinks     Types: 4 Cans of beer per week     Comment: has drank for years    Drug use: Never    Sexual activity: None   Lifestyle    Physical activity     Days per week: None     Minutes per session: None    Stress: None   Relationships    Social connections     Talks on phone: None     Gets together: None     Attends Sabianist service: None     Active member of club or organization: None     Attends meetings of clubs or organizations: None     Relationship status: None    Intimate partner violence     Fear of current or ex partner: None     Emotionally abused: None     Physically abused: None     Forced sexual activity: None   Other Topics Concern    None   Social History Narrative    None         Review of Systems - History obtained from chart review and the patient  General ROS: positive for  - fatigue  Psychological ROS: negative  Ophthalmic ROS: negative  ENT ROS: negative  Allergy and Immunology ROS: negative  Hematological and Lymphatic ROS: negative  Endocrine ROS: negative  Breast ROS: negative for breast lumps  Respiratory ROS: no cough, shortness of breath, or wheezing  Cardiovascular ROS: no chest pain or dyspnea on exertion  Gastrointestinal ROS: no abdominal pain, change in bowel habits, or black or bloody stools  Genito-Urinary ROS: no dysuria, trouble voiding, or hematuria  Musculoskeletal ROS: negative  Neurological ROS: no TIA or stroke symptoms  Dermatological ROS: negative      Physical Exam  HENT:      Head: Normocephalic and atraumatic. Right Ear: External ear normal.      Left Ear: External ear normal.      Nose: Nose normal.      Mouth/Throat:      Mouth: Mucous membranes are moist.   Eyes:      Pupils: Pupils are equal, round, and reactive to light. Neck:      Musculoskeletal: Normal range of motion.    Cardiovascular:      Rate and Rhythm: Normal rate and regular rhythm. Pulses: Normal pulses. Heart sounds: Normal heart sounds. Pulmonary:      Effort: Pulmonary effort is normal.      Breath sounds: Normal breath sounds. Abdominal:      General: Abdomen is flat. Palpations: Abdomen is soft. Musculoskeletal: Normal range of motion. Skin:     General: Skin is warm and dry. Neurological:      General: No focal deficit present. Mental Status: She is alert and oriented to person, place, and time. Psychiatric:         Mood and Affect: Mood normal.         Behavior: Behavior normal.         Thought Content: Thought content normal.         Judgment: Judgment normal.             Imaging reviewed:      OSH imaging reviewed. Radiation Safety and Treatment Support:  -previous Radiation history: No  -history of connective tissue disease: No  -history of autoimmune disease: No  -pregnant: no  -fertility conservation and /or contraception discussed: no  -nutrition consult prior to [address]: Yes  -PEG: No  -Dental evaluation prior to treatment:No  -Social Work requested: No  -Oncology Nurse Navigator requested: Yes  -pre + post treatment PT / Rehab / PM+R evaluation considered: Yes  -ICD: No   -ICD brand: -  -[facility] patient navigator: Montana Liu  -Nurse Practitioners for Radiation Oncology:    ---Shalini Baker, MSN, RN, FNP-C   ---Manisha Camargo, CONCHITA, RN, FNP-BC        Assessment and Plan: Foreign Flanagan  is a pleasant and cooperative 77year old with a recent diagnosis of AJCC stage group I left breast cancer. We recommend adjuvant external beam radiation therapy.   With a breast conserving surgery, combined with fractionated external beam radiation therapy, there is no difference in overall survival compared to mastectomy: ipsilateral breast tumor recurrence rates drop from over 30 % (35 % in the 12 year analysis of NSABP B-06) to 10% [20 year IBRT ; 39% lumpectomy alone vs 14% lumpectomy + RT /// Donnice Hodgkins B et al. Winnie Orozco J Med. 2002 Oct 17;347(93):1298-21]. There is no excess risk of contralateral breast cancer recurrence per [facility][1] data from a similar era. Multiple other trials have demonstrated a reduction in risk of ipsilateral breast tumor recurrence by 2/3 (whole breast radiation), with further possible benefit of Tamoxifen / Aromasin therapy (per Medical Oncology). The recent EBCTCG meta analysis shows an overall local recurrence rate of less the 10 % for node negative patients. Whole breast radiation to 50 Gy in standard fractions may be combined with a boost based on specific disease and patient characteristics to further improve local control [Livier RODRÍGUEZ, J Clin Oncol. 2007 Aug 1;25(22):2352-28] and was discussed in detail with the patient as applicable [place] / hypofractionation will also be considered based on the ACR / GINO guidelines) as well as regional LN irradiation based on clinical and pathology characteristic (a thorough discussion of the potential benefits and risks on this specific aspect of treatment was performed PRN). The risks (detailed discussion), benefits, alternatives, process and logistics of external beam radiation were reviewed. Specifically, we discussed the possible chronic radiation toxicity which may include but is not limited to lymphedema, pneumonitis, skin/cartilage necrosis (rare), rib fracture (rare) , joint pain, brachioplexopathy and cosmetic changes. We answered all of the patient's questions to the best of our ability who verbalized understanding and seemed satisfied. Radiation planning will commence within 7 days; the next step in management being the simulation scan, with external beam radiation to commence in a timely fashion thereafter.   For left sided breast cancer and select right sided cases, utilization of the Active Breathing Coordinator and / or surface guided fractionated external beam radiation therapy (with Vision-RT / 4D) will be considered to reduce radiation dose to the heart (and lung in certain situations) [Keli et al. PRO. 2015 /// Mery et al. Ayaka Vidal. 2011 /// Tuan et al. Janis Garcia. 2012  /// Jaron Gonzalez. 2017]. It was a pleasure meeting Asa Pall today and we appreciate the referral and opportunity to be involved in her care. We had an extensive discussion today regarding the course to date (including a focused review of theapplicable radiographic and laboratory information), multidisciplinary approach to cancer care, and indications for external beam radiation therapy as a component therein. \ A literature review and multidisciplinary discussion was performed after seeing this patient due to the complexity of the medical decision making in this case. I personally spent greater than 95 minutes on this case and with this patient. I performed the complete history and physical as above at today's visit, at least 45 minutes was in direct discussion and  regarding disease management.          -sim , left whole breast short course with cardiac sparing DIBH ABC at [facility]  -pt need a Medical Oncologist and asked for my rec. Consult to Dr. Joe Michael placed. Romel Stephen. Jax Jacob MD [place] [address] Oncology  Cell: [phone]    [facility]:  [facility]: [phone]  [facility]:  [phone]   FAX:    [phone]  Kerire Chris:  [phone]   FAX:  [phone]        NOTE: This report was transcribed using voice recognition software. Every effort was made to ensure accuracy; however, inadvertent computerized transcription errors may be present.

## 2021-01-11 NOTE — PATIENT INSTRUCTIONS
JOSEPH Caldwell. Marco Jones MD [facility] Oncology  Cell: [phone]    [facility]:  [phone]   FAX: [phone] [address] [facility]:  [address] [place] [address] Avenue:    [phone]  [address] [place] [address] Road:  [phone]   FAX:  [phone]  Email: [email or web address]

## 2021-01-11 NOTE — PROGRESS NOTES
Franck Óscar  1955 77 y.o. Referring Physician: Dr. Alivia Leach    PCP: Amy Coronado, DO     Vitals:    21 1511   BP: (!) 144/86   Pulse: 85   Resp: 18   Temp: 96.8 °F (36 °C)   SpO2: 98%        Wt Readings from Last 3 Encounters:   21 182 lb 4.8 oz (82.7 kg)   21 180 lb (81.6 kg)   20 180 lb (81.6 kg)        Body mass index is 30.34 kg/m². Chief Complaint: No chief complaint on file. Cancer Staging  No matching staging information was found for the patient. Prior Radiation Therapy? NO    Concurrent Chemo/radiation? NO    Prior Chemotherapy? NO    Prior Hormonal Therapy? NO    Head and Neck Cancer? No, patient does NOT have HN cancer. LMP: 37    Age at first Menses: 13    : 2    Para: 2        Current Outpatient Medications   Medication Sig Dispense Refill    acetaminophen (TYLENOL) 500 MG tablet Take 1 tablet by mouth 4 times daily as needed for Pain 120 tablet 0    ibuprofen (ADVIL;MOTRIN) 600 MG tablet Take 1 tablet by mouth 4 times daily as needed for Pain 40 tablet 0    levothyroxine (SYNTHROID) 50 MCG tablet Take 50 mcg by mouth Daily       No current facility-administered medications for this encounter.         Past Medical History:   Diagnosis Date    Allergic rhinitis     seasonal    Cancer ([facility] [address]     Hypothyroidism        Past Surgical History:   Procedure Laterality Date    BREAST BIOPSY Left 2020    LEFT AXILLARY SENTINEL LYMPH NODE BIOPSY--NEOPROBE performed by Eber Mari MD at . [address] LUMPECTOMY Left 2020    LEFT BREAST NEEDLE LOCALIZED LUMPECTOMY --TRIDENT performed [facility] Eber [facility] MD at [facility] COLONOSCOPY  2016    TUBAL LIGATION         Family History   Problem Relation Age of Onset    Cancer Father 79        prostate cancer    Cancer Brother 54        prostate    Cancer Brother 54        prostate cancer       Social History     Socioeconomic History    Marital status:      Spouse name: Not ductal with foci of LCIS, DCIS, with negative margins and 12/18/2020 left axillary LN biopsy -7 LN neg. Pt doesn't have a consult with medical oncology at this time. Will follow up with that. Pt vebalized understanding from a nursing perspective. Pacemaker/Defibulator/ICD:  No    Mediport: No        FALLS RISK SCREENING ASSESSMENT    Instructions:  Assess the patient and enter the appropriate indicators that are present for fall risk identification. Total the numbers entered and assign a fall risk score from Table 2.  Reassess patient at a minimum every 12 weeks or with status change. Assessment   Date  1/11/2021     1. Mental Ability: confusion/cognitively impaired No - 0       2. Elimination Issues: incontinence, frequency No - 0       3. Ambulatory: use of assistive devices (walker, cane, off-loading devices), attached to equipment (IV pole, oxygen) No - 0     4. Sensory Limitations: dizziness, vertigo, impaired vision No - 0       5. Age 72 years or greater - 1       10. Medication: diuretics, strong analgesics, hypnotics, sedatives, antihypertensive agents   No - 0   7. Falls:  recent history of falls within the last 3 months (not to include slipping or tripping)   No - 0   TOTAL 1    If score of 4 or greater was education given? Yes       TABLE 2   Risk Score Risk Level Plan of Care   0-3 Little or  No Risk 1. Provide assistance as indicated for ambulation activities  2. Reorient confused/cognitively impaired patient  3. Call-light/bell within patient's reach  4. Chair/bed in low position, stretcher/bed with siderails up except when performing patient care activities  5. Educate patient/family/caregiver on falls prevention  6.  Reassess in 12 weeks or with any noted change in patient condition which places them at a risk for a fall   4-6 Moderate Risk 1. Provide assistance as indicated for ambulation activities  2. Reorient confused/cognitively impaired patient  3.   Call-light/bell within patient's reach  4. Chair/bed in low position, stretcher/bed with siderails up except when performing patient care activities  5. Educate patient/family/caregiver on falls prevention  6. Falls risk precaution (Yellow sticker Level II) placed on patient chart   7 or   Higher High Risk 1. Place patient in easily observable treatment room  2. Patient attended at all times by family member or staff  3. Provide assistance as indicated for ambulation activities  4. Reorient confused/cognitively impaired patient  5. Call-light/bell within patient's reach  6. Chair/bed in low position, stretcher/bed with siderails up except when performing patient care activities  7. Educate patient/family/caregiver on falls prevention  8. Falls risk precaution (Yellow sticker Level III) placed on patient chart           MALNUTRITION RISK SCREENING ASSESSMENT    Instructions:  Assess the patient and enter the appropriate indicators that are present for nutrition risk identification. Total the numbers entered and assign a risk score. Follow the appropriate action for total score listed below. Assessment   Date  1/11/2021     1. Have you lost weight without trying? 0- No     2. Have you been eating poorly because of a decreased appetite? 0- No   3. Do you have a diagnosis of head and neck cancer?       0- No                                                                                    TOTAL 0          Score of 0-1: No action  Score 2 or greater:  · For Non-Diabetic Patient: Recommend adding Ensure Complete 2 x daily and provide patient with Ensure wellness bag with coupons  · For Diabetic Patient: Recommend adding Glucerna Shake 2 x daily and provide patient with Glucerna Wellness bag with coupons  · Route to the dietitian via [address]    · Are you having  difficulty performing daily routine tasks  due to fatigue or weakness (ie: bathing/showering, dressing, housework, meal prep, work, child Chanetta Cool): No     · Do you have any arm flexibility/ROM restrictions, swelling or pain that limit activity: No     · Any changes in memory, attention/focus that impact daily activities: No     · Do you avoid participation in leisure/social activity due weakness, fatigue or pain: No     ARE ANY OF THE ABOVE ARE ANSWERED YES: No          PT ASSESSMENT FOR REFERRAL    · Have you had any recent falls in past 2 months: No     · Do you have difficulty  going up/down stairs: No     · Are you having difficulty walking: No     · Do you often hold onto furniture/environmental supports or feel off balance when you are walking: No     · Do you need to take rest breaks when you are walking: No     · Any pain on scale of 1-10 that limits your mobility: No 0/10    ARE ANY OF THE ABOVE ARE ANSWERED YES: No           LYMPHEDEMA SCREENING ASSESSMENT FOR PATIENTS WITH BREAST CANCER    The patient reports the following signs/symptoms of lymphedema: None    Please ask the provider to assess patient for lymphedema for any reported signs or symptoms so a referral to Lymphedema Therapy can be considered. PREHAB AUDIOLOGY REFERRAL    - Is patient planned to receive Cisplatin? No. This patient is not planned to start Cisplatin. - Is patient planned to receive radiation therapy that may be directed toward auditory canals or nerves? No. Patient is not planned to start radiation therapy to auditory canals or nerves. - Is patient complaining of new onset hearing loss? No. Patient is not complaining of new onset hearing loss. Patient education given on Left breast radiation therapy. . The patient expresses understanding and acceptance of instructions.  Tony Nelson 1/11/2021 3:14 PM           Tony Nelson

## 2021-01-13 ENCOUNTER — TELEPHONE (OUTPATIENT)
Dept: CASE MANAGEMENT | Age: 66
End: 2021-01-13

## 2021-01-13 NOTE — TELEPHONE ENCOUNTER
Called patient to inform her that her oncotype Dx results will not be resulted unitl 1/21/21. Patient's 1/14/21 appointment will be rescheduled, medical oncology front office staff notified.

## 2021-01-20 ENCOUNTER — HOSPITAL ENCOUNTER (OUTPATIENT)
Dept: RADIATION ONCOLOGY | Age: 66
Discharge: HOME | End: 2021-01-20
Attending: RADIOLOGY
Payer: MEDICARE

## 2021-01-20 PROCEDURE — 77263 THER RADIOLOGY TX PLNG CPLX: CPT | Performed by: RADIOLOGY

## 2021-01-20 PROCEDURE — 77290 THER RAD SIMULAJ FIELD CPLX: CPT | Performed by: RADIOLOGY

## 2021-01-20 PROCEDURE — 77334 RADIATION TREATMENT AID(S): CPT | Performed by: RADIOLOGY

## 2021-01-22 ENCOUNTER — HOSPITAL ENCOUNTER (OUTPATIENT)
Dept: RADIATION ONCOLOGY | Age: 66
Discharge: HOME | End: 2021-01-22
Attending: RADIOLOGY
Payer: MEDICARE

## 2021-01-22 PROCEDURE — 77295 3-D RADIOTHERAPY PLAN: CPT | Performed by: RADIOLOGY

## 2021-01-22 PROCEDURE — 77334 RADIATION TREATMENT AID(S): CPT | Performed by: RADIOLOGY

## 2021-01-22 PROCEDURE — 77293 RESPIRATOR MOTION MGMT SIMUL: CPT | Performed by: RADIOLOGY

## 2021-01-22 PROCEDURE — 77300 RADIATION THERAPY DOSE PLAN: CPT | Performed by: RADIOLOGY

## 2021-01-26 ENCOUNTER — TELEPHONE (OUTPATIENT)
Dept: BREAST CENTER | Age: 66
End: 2021-01-26

## 2021-01-27 ENCOUNTER — HOSPITAL ENCOUNTER (OUTPATIENT)
Dept: INFUSION THERAPY | Age: 66
Discharge: HOME | End: 2021-01-27
Payer: MEDICARE

## 2021-01-27 ENCOUNTER — HOSPITAL ENCOUNTER (OUTPATIENT)
Dept: RADIATION ONCOLOGY | Age: 66
Discharge: HOME | End: 2021-01-27
Attending: RADIOLOGY
Payer: MEDICARE

## 2021-01-27 ENCOUNTER — HOSPITAL ENCOUNTER (OUTPATIENT)
Dept: RADIATION ONCOLOGY | Age: 66
Discharge: HOME | End: 2021-01-27
Payer: MEDICARE

## 2021-01-27 ENCOUNTER — OFFICE VISIT (OUTPATIENT)
Dept: ONCOLOGY | Age: 66
End: 2021-01-27
Payer: MEDICARE

## 2021-01-27 ENCOUNTER — TELEPHONE (OUTPATIENT)
Dept: CASE MANAGEMENT | Age: 66
End: 2021-01-27

## 2021-01-27 VITALS
HEART RATE: 52 BPM | WEIGHT: 178.9 LBS | TEMPERATURE: 98.2 F | OXYGEN SATURATION: 98 % | RESPIRATION RATE: 16 BRPM | DIASTOLIC BLOOD PRESSURE: 80 MMHG | BODY MASS INDEX: 29.81 KG/M2 | HEIGHT: 65 IN | SYSTOLIC BLOOD PRESSURE: 174 MMHG

## 2021-01-27 VITALS
BODY MASS INDEX: 30.62 KG/M2 | SYSTOLIC BLOOD PRESSURE: 124 MMHG | DIASTOLIC BLOOD PRESSURE: 76 MMHG | WEIGHT: 184 LBS | RESPIRATION RATE: 18 BRPM | HEART RATE: 65 BPM | OXYGEN SATURATION: 100 %

## 2021-01-27 DIAGNOSIS — C50.919 MALIGNANT NEOPLASM OF FEMALE BREAST, UNSPECIFIED ESTROGEN RECEPTOR STATUS, UNSPECIFIED LATERALITY, UNSPECIFIED SITE OF BREAST (HCC): Primary | ICD-10-CM

## 2021-01-27 DIAGNOSIS — Z85.3 PERSONAL HISTORY OF BREAST CANCER: Primary | ICD-10-CM

## 2021-01-27 PROCEDURE — 99999 PR OFFICE/OUTPT VISIT,PROCEDURE ONLY: CPT | Performed by: RADIOLOGY

## 2021-01-27 PROCEDURE — 99214 OFFICE O/P EST MOD 30 MIN: CPT

## 2021-01-27 PROCEDURE — 99205 OFFICE O/P NEW HI 60 MIN: CPT | Performed by: INTERNAL MEDICINE

## 2021-01-27 PROCEDURE — 77417 THER RADIOLOGY PORT IMAGE(S): CPT | Performed by: RADIOLOGY

## 2021-01-27 PROCEDURE — 77412 RADIATION TX DELIVERY LVL 3: CPT | Performed by: RADIOLOGY

## 2021-01-27 NOTE — TELEPHONE ENCOUNTER
Met with patient during their initial consultation with Dr. Shira Miller for their recent breast cancer diagnosis. Introduced myself and explained my role with patients receiving treatment at our center. Patient was friendly and receptive. Instructed in detail on their lumpectomy/SLNB pathology findings including cancer type . Instructed on next steps including chemotherapy treatments and any additional imaging per Dr. Shira Miller' recommendations and follow up care. Provided with  literature chemo care arimidex. PARTH faxed to Dr. Charmaine Chung for most recent dexa scan. New patient nursing assessment, medical history, surgical history, family history completed. Medication list reviewed and updated. Provided with my contact information and instructed patient to call me with questions or concerns. Patient Verbalizes understanding and was appreciative of visit.   Patient will continue to be followed my the navigation team.

## 2021-01-27 NOTE — PROGRESS NOTES
Department of [facility] Oncology   Attending Consult Note    Reason for Visit: Consultation on a patient with Left Breast Cancer    Referring Physician: Cesar Saeed MD     PCP:  Derick Degroot DO    History of Present Illness:  77year old female found to have lesion in upper inner quadrant of left breast    OBSTETRIC RELATED HISTORY:   Age of menarche was 13. Age of menopause was 53. Patient denies hormonal therapy. Patient is . Age of first live birth was 30. Patient did not breast feed. Is patient interested in fertility information about fertility preservation? No     Left breast US 2020:  1.4 cm oval nodule with indistinct and microlobulated margin left breast upper inner aspect middle depth. Suspicious of malignancy follow-up recommended    US guided left breast core biopsy 2020:  Scattered fibroglandular elements in both breasts  New 8 mm irregular density with indistinct margin in left breast at 12 oclock   -Atypical papillary lesion favor intracystic papillary carcinoma  -Atypical lobular hyperplasia with focal minute microcalcification  -Immunostaining for ER is +95% and PA is +95%  -CK 5/6 is negative and epithelial cells. E-cadherin is positive in the papillary lesion and negative in the atypical lobular hyperplasia. Left breast Needle Localized Lumpectomy on 2020: Invasive lobular and invasive ductal carcinoma with foci of lobular carcinoma in situ, ductal carcinoma in situ, low-grade, cribriform type and encapsulated papillary carcinoma. Margins of excision free of carcinoma    Comment:   The lumpectomy specimen contains a mixed invasive lobular (E-cadherin negative, 1.2 x 0.6 x 0.5 cm) and ductal carcinoma (E-cadherin positive, 1.5 x 1.3 x 1.1 cm).     The invasive lobular component has a Mikado score of 3+1+1 = 5 with adjacent areas of lobular carcinoma in situ.  The invasive lobular and LCIS come to within 0.5 mm of the inked lateral margin of excision.    The invasive ductal carcinoma has a [place] score of 2+1+1 = 4 and has adjacent areas of   ductal carcinoma in situ, cribriform type, low nuclear grade as well as a 0.5 cm area of encapsulated papillary carcinoma (p63 negative for myoepithelial cells).    The remaining breast tissue shows focal intraductal papillomata as well as focal microcalcifications. Breast Cancer Marker Studies:   Estrogen Receptors (ER): 90%   Progesterone Receptors (MN): 90%   Her-2/lupillo (c-erb B-2) protein expression: Negative (0)      Clinical Stage I left breast cancer. Left axillary sentinel lymph node biopsy on 12/18/2020:  A.  [place] lymph node #1, biopsy: Four (4) reactive nodes, negative for malignancy. B.  [place] lymph node #2, biopsy: One (1) reactive node, negative for malignancy. C.  [place] lymph node #3, biopsy: Two (2) reactive nodes, negative for malignancy. pT1N0 M0 ER+MN+ HER2 -  Oncotype recurrence score: 0  Distant recurrence risk at 9 years with AI or PEREZ alone- 3%   Absolute chemotherapy benefit RS 0-10 All Ages: <1%    Review of Systems;  CONSTITUTIONAL: No fever, chills. Fair appetite and energy level  ENMT: Eyes: No diplopia; Nose: No epistaxis. Mouth: No sore throat. RESPIRATORY: No hemoptysis, shortness of breath, cough. CARDIOVASCULAR: No chest pain, palpitations. GASTROINTESTINAL: No nausea/vomiting, abdominal pain  GENITOURINARY: No dysuria, urinary frequency, hematuria. NEURO: No syncope, presyncope, headache.   Remainder:  ROS NEGATIVE    Past Medical History:      Diagnosis Date    Allergic rhinitis     seasonal    Cancer ([facility] [address]     Hypothyroidism      Past Surgical History:      Procedure Laterality Date    BREAST BIOPSY Left 12/18/2020    LEFT AXILLARY SENTINEL LYMPH NODE BIOPSY--NEOPROBE performed by Tina Thomas MD at [address] [place] [address]  BREAST LUMPECTOMY Left 2020    LEFT BREAST NEEDLE LOCALIZED LUMPECTOMY --TRIDENT performed by Kirk Self MD at [address] [place] [address]  2016    TUBAL LIGATION       Family History:  Family History   Problem Relation Age of Onset    Cancer Father 79        prostate cancer    Cancer Brother 54        prostate    Cancer Brother 54        prostate cancer     Medications:  Reviewed and reconciled. Social History:  Social History     Socioeconomic History    Marital status:      Spouse name: Not on file    Number of children: 2    Years of education: Not on file    Highest education level: Not on file   Occupational History    Occupation: [employer] Financial resource strain: Not on file    Food insecurity     Worry: Not on file     Inability: Not on file   [employer] needs     Medical: Not on file     Non-medical: Not on file   Tobacco Use    Smoking status: Former Smoker     Quit date:      Years since quittin.0    Smokeless tobacco: Never Used   Substance and Sexual Activity    Alcohol use:  Yes     Alcohol/week: 4.0 standard drinks     Types: 4 Cans of beer per week     Comment: has drank for years    Drug use: Never    Sexual activity: Not on file   Lifestyle    Physical activity     Days per week: Not on file     Minutes per session: Not on file    Stress: Not on file   Relationships    Social connections     Talks on phone: Not on file     Gets together: Not on file     Attends Catholic service: Not on file     Active member of club or organization: Not on file     Attends meetings of clubs or organizations: Not on file     Relationship status: Not on file    Intimate partner violence     Fear of current or ex partner: Not on file     Emotionally abused: Not on file     Physically abused: Not on file     Forced sexual activity: Not on file   Other Topics Concern    Not on file   Social History Narrative    Not on file     Allergies: No Known Allergies    Physical Exam:  BP (!) 174/80 (Site: Right Upper Arm, Position: Sitting, Cuff Size: Medium Adult)   Pulse 52   Temp 98.2 °F (36.8 °C)   Resp 16   Ht 5' 5\" (1.651 m)   Wt 178 lb 14.4 oz (81.1 kg)   SpO2 98%   BMI 29.77 kg/m²   GENERAL: Alert, oriented x 3,not in distress  HEENT: PERRLA; EOMI. Oropharynx clear. NECK: Supple. Without lymphadenopathy. LUNGS: Good air entry bilaterally. No wheezing, crackles or ronchi. CARDIOVASCULAR: Regular rate. No murmurs, rubs or gallops. ABDOMEN: Soft. Non-tender, non-distended. Positive bowel sounds. EXTREMITIES: Without clubbing, cyanosis, or edema. NEUROLOGIC: No focal deficits. ECOG PS 1    Impression/Plan:  76 y/o female who underwent Left breast Needle Localized Lumpectomy on 12/4/2020: Invasive lobular and invasive ductal carcinoma with foci of lobular carcinoma in situ, ductal carcinoma in situ, low-grade, cribriform type and encapsulated papillary carcinoma. Margins of excision free of carcinoma    Comment:   The lumpectomy specimen contains a mixed invasive lobular (E-cadherin negative, 1.2 x 0.6 x 0.5 cm) and ductal carcinoma (E-cadherin positive, 1.5 x 1.3 x 1.1 cm).    The invasive lobular component has a [place] score of 3+1+1 = 5 with adjacent areas of   lobular carcinoma in situ.  The invasive lobular and LCIS come to within 0.5 mm of the inked lateral margin of excision.    The invasive ductal carcinoma has a [place] score of 2+1+1 = 4 and has adjacent areas of   ductal carcinoma in situ, cribriform type, low nuclear grade as well as a 0.5 cm area of encapsulated papillary carcinoma (p63 negative for myoepithelial cells).    The remaining breast tissue shows focal intraductal papillomata as well as focal microcalcifications.      Breast Cancer Marker Studies:   Estrogen Receptors (ER): 90%   Progesterone Receptors (RI): 90%   Her-2/lupillo (c-erb B-2) protein expression: Negative (0)     Left axillary sentinel lymph node biopsy on 12/18/2020:  A.  [place] lymph node #1, biopsy: Four (4) reactive nodes, negative for malignancy. B.  [place] lymph node #2, biopsy: One (1) reactive node, negative for malignancy. C.  [place] lymph node #3, biopsy: Two (2) reactive nodes, negative for malignancy. pT1N0 M0 ER+ SC + HER/2 -  Oncotype recurrence score: 0  Distant recurrence risk at 9 years with AI or PEREZ alone- 3%   Absolute chemotherapy benefit RS 0-10 All Ages: <1%    Pathology report and NCCN guidelines reviewed with patient. No indication for adjuvant chemotherapy. Recommended adjuvant RT followed by adjuvant endocrine therapy (Arimidex 1 mg po daily for 5 years). DEXA scan done at her PCP's office in [facility] (records requested). RT starts today 01/27/2021. RTC around the end of RT to initiate adjuvant endocrine therapy. Thank you for allowing us to participate in the care of Mrs. Martel  JANNIE Hall CNP    The patient was seen and examined, I agree with AMBROSE Hall findings, assessment and plan as outlined.   01/27/2021  Emir Powell MD

## 2021-01-27 NOTE — PATIENT INSTRUCTIONS
Continue daily fractionated radiation therapy as scheduled. Please see weekly OTV note and intial consultation letter in [facility] for clinical details. Iris Velasquez. Elenita Tamayo MD MS Radha Smithlich:  [phone]   FAX: [phone]  [facility]:  [phone]   FAX:    [phone]  [address] [place] [address] Road:  [phone]   FAX:  [phone]  Email: [email or web address]

## 2021-01-27 NOTE — PROGRESS NOTES
Lillie Hill  1955 77 y.o. Referring Physician: Dr. Paula Ragland    PCP: Juve Koch,     Vitals:    21 1128   BP: (!) 174/80   Pulse: 52   Resp: 16   Temp: 98.2 °F (36.8 °C)   SpO2: 98%        Wt Readings from Last 3 Encounters:   21 178 lb 14.4 oz (81.1 kg)   21 182 lb 4.8 oz (82.7 kg)   21 180 lb (81.6 kg)        Body mass index is 29.77 kg/m². Chief Complaint:   Chief Complaint   Patient presents with    New Patient         Cancer Staging  No matching staging information was found for the patient. Prior Radiation Therapy? NO    Concurrent Chemo/radiation? NO    Prior Chemotherapy? NO    Prior Hormonal Therapy? NO    Head and Neck Cancer? No, patient does NOT have HN cancer. OBSTETRIC RELATED HISTORY:  Age of menarche was 13. Age of menopause was 53. Patient denies hormonal therapy. Patient is . Age of first live birth was 30.              Current Outpatient Medications:     acetaminophen (TYLENOL) 500 MG tablet, Take 1 tablet by mouth 4 times daily as needed for Pain, Disp: 120 tablet, Rfl: 0    ibuprofen (ADVIL;MOTRIN) 600 MG tablet, Take 1 tablet by mouth 4 times daily as needed for Pain, Disp: 40 tablet, Rfl: 0    levothyroxine (SYNTHROID) 50 MCG tablet, Take 50 mcg by mouth Daily, Disp: , Rfl:        Past Medical History:   Diagnosis Date    Allergic rhinitis     seasonal    Cancer ([facility] [address]     Hypothyroidism        Past Surgical History:   Procedure Laterality Date    BREAST BIOPSY Left 2020    LEFT AXILLARY SENTINEL LYMPH NODE BIOPSY--NEOPROBE performed by Bryanna Han MD at [address] LUMPECTOMY Left 2020    LEFT BREAST NEEDLE LOCALIZED LUMPECTOMY --TRIDENT performed by Bryanna Han MD at [facility] COLONOSCOPY  2016    TUBAL LIGATION         Family History   Problem Relation Age of Onset    Cancer Father 79        prostate cancer    Cancer Brother 54        prostate    Cancer Brother 54        prostate cancer Instructions:  Assess the patient and [place] the appropriate indicators that are present for fall risk identification. Total the numbers circled and assign a fall risk score from Table 2.  Reassess patient at a minimum every 12 weeks or with status change. Assessment   Date  1/27/2021     1. Mental Ability: confusion/cognitively impaired No - 0       2. Elimination Issues: incontinence, frequency Yes - 3       3. Ambulatory: use of assistive devices (walker, cane, off-loading devices), attached to equipment (IV pole, oxygen) No - 0     4. Sensory Limitations: dizziness, vertigo, impaired vision No - 0       5. Age 72 years or greater - 1       10. Medication: diuretics, strong analgesics, hypnotics, sedatives, antihypertensive agents   No - 0   7. Falls:  recent history of falls within the last 3 months (not to include slipping or tripping)   No - 0   TOTAL 4    If score of 4 or greater was education given? Yes       TABLE 2   Risk Score Risk Level Plan of Care   0-3 Little or  No Risk 1. Provide assistance as indicated for ambulation activities  2. Reorient confused/cognitively impaired patient  3. Call-light/bell within patient's reach  4. Chair/bed in low position, stretcher/bed with siderails up except when performing patient care activities  5. Educate patient/family/caregiver on falls prevention  6.  Reassess in 12 weeks or with any noted change in patient condition which places them at a risk for a fall   4-6 Moderate Risk 1. Provide assistance as indicated for ambulation activities  2. Reorient confused/cognitively impaired patient  3. Call-light/bell within patient's reach  4. Chair/bed in low position, stretcher/bed with siderails up except when performing patient care activities  5. Educate patient/family/caregiver on falls prevention  6.   Falls risk precaution (Yellow sticker Level II) placed on patient chart   7 or Higher High Risk 1. Place patient in easily observable treatment room  2. Patient attended at all times by family member or staff  3. Provide assistance as indicated for ambulation activities  4. Reorient confused/cognitively impaired patient  5. Call-light/bell within patient's reach  6. Chair/bed in low position, stretcher/bed with siderails up except when performing patient care activities  7. Educate patient/family/caregiver on falls prevention  8. Falls risk precaution (Yellow sticker Level III) placed on patient chart           MALNUTRITION RISK SCREENING ASSESSMENT    Instructions:  Assess the patient and enter the appropriate indicators that are present for nutrition risk identification. Total the numbers entered and assign a risk score. Follow the appropriate action for total score listed below. Assessment   Date  1/27/2021     1. Have you lost weight without trying? 0- No     2. Have you been eating poorly because of a decreased appetite? 0- No   3. Do you have a diagnosis of head and neck cancer?       0- No                                                                                    TOTAL 0        Score of 0-1: No action  Score 2 or greater:  · For Non-Diabetic Patient: Recommend adding Ensure Enlive 2 x daily and provide patient with Ensure wellness bag with coupons  · For Diabetic Patient: Recommend adding Glucerna Shake 2 x daily and provide patient with Glucerna Wellness bag with coupons  · Route to the dietitian via [id number]    · Are you having  difficulty performing daily routine tasks  due to fatigue or weakness (ie: bathing/showering, dressing, housework, meal prep, work, child [place]): No     · Do you have any arm flexibility/ROM restrictions, swelling or pain that limit activity: No     · Any changes in memory, attention/focus that impact daily activities: No · Do you avoid participation in leisure/social activity due weakness, fatigue or pain: No     ARE ANY OF THE ABOVE ARE ANSWERED YES: No          PT ASSESSMENT FOR REFERRAL    · Have you had any recent falls in past 2 months: No     · Do you have difficulty  going up/down stairs: No     · Are you having difficulty walking: No     · Do you often hold onto furniture/environmental supports or feel off balance when you are walking: No     · Do you need to take rest breaks when you are walking: No     · Any pain on scale of 1-10 that limits your mobility: No 0/10    ARE ANY OF THE ABOVE ARE ANSWERED YES: No           PREHAB REFERRALS FOR NEOADJUVANT BREAST CANCER PATIENTS    Is this patient a breast cancer patient requiring neoadjuvant chemotherapy: No, this patient does NOT require neoadjuvant chemotherapy. LYMPHEDEMA SCREENING ASSESSMENT FOR PATIENTS WITH BREAST CANCER    The patient reports the following signs/symptoms of lymphedema: None    Please ask the provider to assess patient for lymphedema for any reported signs or symptoms so a referral to Lymphedema Therapy can be considered. PREHAB AUDIOLOGY REFERRAL    - Is patient planned to receive Cisplatin? No. This patient is not planned to start Cisplatin. - Is patient complaining of new onset hearing loss? No. Patient is not complaining of new onset hearing loss.         Richa Wu

## 2021-01-27 NOTE — PROGRESS NOTES
DEPARTMENT OF RADIATION ONCOLOGY ON TREATMENT VISIT         1/27/2021      NAME:  Juliane Kerns    YOB: 1955    Diagnosis:  Breast cancer    SUBJECTIVE:   Juliane Kerns has now received fractionated external beam radiation therapy - ongoing. Past medical, surgical, social and family histories reviewed and updated as indicated. Pain: controlled    ALLERGIES:  Patient has no known allergies. Current Outpatient Medications   Medication Sig Dispense Refill    acetaminophen (TYLENOL) 500 MG tablet Take 1 tablet by mouth 4 times daily as needed for Pain 120 tablet 0    ibuprofen (ADVIL;MOTRIN) 600 MG tablet Take 1 tablet by mouth 4 times daily as needed for Pain 40 tablet 0    levothyroxine (SYNTHROID) 50 MCG tablet Take 50 mcg by mouth Daily       No current facility-administered medications for this encounter. OBJECTIVE:  Alert and fully ambulatory. Pleasant and conversant. Physical Examination: General appearance - alert, well appearing, and in no distress. Wt Readings from Last 3 Encounters:   01/27/21 184 lb (83.5 kg)   01/27/21 178 lb 14.4 oz (81.1 kg)   01/11/21 182 lb 4.8 oz (82.7 kg)         ASSESSMENT/PLAN:     Patient is tolerating treatments well with expected toxicities. RBA were reviewed prior to first fraction and PRN. Current and planned dose reviewed. Goals of treatment and potential side effects were reviewed with the patient PRN. Treatment imaging has been personally reviewed for accuracy and precision. Questions answered to apparent satisfaction. Treatments will continue as planned. Julieta Camargo.  MD MS ISADORA Thompson  Radiation Oncologist        PHYSICIANS [facility]): [phone] /// FAX: [phone]  [facility]): [phone] /// FAX: [phone]  [facility]): [phone] /// FAX: [phone]

## 2021-01-27 NOTE — PROGRESS NOTES
[place] Preciado [facility]  1/27/2021  Wt Readings from Last 3 Encounters:   01/27/21 184 lb (83.5 kg)   01/27/21 178 lb 14.4 oz (81.1 kg)   01/11/21 182 lb 4.8 oz (82.7 kg)     Body mass index is 30.62 kg/m². Treatment Area:left breast     Patient was seen today for weekly visit. Comfort Alteration  KPS:90%  Fatigue: None    Nutritional Alteration  Anorexia: No   Nausea: No   Vomiting: No     Skin Alteration   Sensation:na    Radiation Dermatitis:  na    Mucous Membrane Alteration  Drainage: No  Lymphedema: No    Emotional  Coping: effective    Sexuality Alteration  na    Injury, potential bleeding or infection: na        Lab Results   Component Value Date    WBC 5.2 12/18/2020     12/18/2020         /76   Pulse 65   Resp 18   Wt 184 lb (83.5 kg)   SpO2 100%   BMI 30.62 kg/m²   BP within normal range? yes         Assessment/Plan: patient completed 1/16 fx, 266cGy/4256.     Kareem Yañez

## 2021-01-28 ENCOUNTER — HOSPITAL ENCOUNTER (OUTPATIENT)
Dept: RADIATION ONCOLOGY | Age: 66
Discharge: HOME | End: 2021-01-28
Attending: RADIOLOGY
Payer: MEDICARE

## 2021-01-28 PROCEDURE — 77412 RADIATION TX DELIVERY LVL 3: CPT | Performed by: RADIOLOGY

## 2021-01-29 ENCOUNTER — HOSPITAL ENCOUNTER (OUTPATIENT)
Dept: RADIATION ONCOLOGY | Age: 66
Discharge: HOME | End: 2021-01-29
Attending: RADIOLOGY
Payer: MEDICARE

## 2021-01-29 PROCEDURE — 77412 RADIATION TX DELIVERY LVL 3: CPT | Performed by: RADIOLOGY

## 2021-02-01 ENCOUNTER — HOSPITAL ENCOUNTER (OUTPATIENT)
Dept: RADIATION ONCOLOGY | Age: 66
Discharge: HOME | End: 2021-02-01
Attending: RADIOLOGY
Payer: MEDICARE

## 2021-02-01 PROCEDURE — 77412 RADIATION TX DELIVERY LVL 3: CPT | Performed by: RADIOLOGY

## 2021-02-02 ENCOUNTER — HOSPITAL ENCOUNTER (OUTPATIENT)
Dept: RADIATION ONCOLOGY | Age: 66
Discharge: HOME | End: 2021-02-02
Attending: RADIOLOGY
Payer: MEDICARE

## 2021-02-02 PROCEDURE — 77427 RADIATION TX MANAGEMENT X5: CPT | Performed by: RADIOLOGY

## 2021-02-02 PROCEDURE — 77412 RADIATION TX DELIVERY LVL 3: CPT | Performed by: RADIOLOGY

## 2021-02-02 PROCEDURE — 77336 RADIATION PHYSICS CONSULT: CPT | Performed by: RADIOLOGY

## 2021-02-03 ENCOUNTER — HOSPITAL ENCOUNTER (OUTPATIENT)
Dept: RADIATION ONCOLOGY | Age: 66
Discharge: HOME | End: 2021-02-03
Attending: RADIOLOGY
Payer: MEDICARE

## 2021-02-03 VITALS
BODY MASS INDEX: 30.24 KG/M2 | SYSTOLIC BLOOD PRESSURE: 126 MMHG | RESPIRATION RATE: 18 BRPM | WEIGHT: 181.7 LBS | DIASTOLIC BLOOD PRESSURE: 70 MMHG | OXYGEN SATURATION: 96 % | HEART RATE: 88 BPM

## 2021-02-03 DIAGNOSIS — C50.919 MALIGNANT NEOPLASM OF FEMALE BREAST, UNSPECIFIED ESTROGEN RECEPTOR STATUS, UNSPECIFIED LATERALITY, UNSPECIFIED SITE OF BREAST (HCC): Primary | ICD-10-CM

## 2021-02-03 PROCEDURE — 99999 PR OFFICE/OUTPT VISIT,PROCEDURE ONLY: CPT | Performed by: RADIOLOGY

## 2021-02-03 PROCEDURE — 77412 RADIATION TX DELIVERY LVL 3: CPT | Performed by: RADIOLOGY

## 2021-02-03 PROCEDURE — 77417 THER RADIOLOGY PORT IMAGE(S): CPT | Performed by: RADIOLOGY

## 2021-02-03 NOTE — PROGRESS NOTES
[facility]  2/3/2021  Wt Readings from Last 3 Encounters:   01/27/21 184 lb (83.5 kg)   01/27/21 178 lb 14.4 oz (81.1 kg)   01/11/21 182 lb 4.8 oz (82.7 kg)     There is no height or weight on file to calculate BMI. Treatment Area:left breast    Patient was seen today for weekly visit. Comfort Alteration  KPS:90%  Fatigue: Mild    Nutritional Alteration  Anorexia: No   Nausea: No   Vomiting: No     Skin Alteration   Sensation:na    Radiation Dermatitis:  na    Mucous Membrane Alteration  Drainage: No  Lymphedema: No    Emotional  Coping: effective    Sexuality Alteration  na    Injury, potential bleeding or infection: na        Lab Results   Component Value Date    WBC 5.2 12/18/2020     12/18/2020         There were no vitals taken for this visit. BP within normal range?  yes       Assessment/Plan: patient has completed 6/16 fractions, 1596 cGy/4256    Guillermo Moon

## 2021-02-04 ENCOUNTER — TELEPHONE (OUTPATIENT)
Dept: INFUSION THERAPY | Age: 66
End: 2021-02-04

## 2021-02-04 ENCOUNTER — HOSPITAL ENCOUNTER (OUTPATIENT)
Dept: RADIATION ONCOLOGY | Age: 66
Discharge: HOME | End: 2021-02-04
Attending: RADIOLOGY
Payer: MEDICARE

## 2021-02-04 PROCEDURE — 77412 RADIATION TX DELIVERY LVL 3: CPT | Performed by: RADIOLOGY

## 2021-02-04 NOTE — TELEPHONE ENCOUNTER
Benefits Investigation    Insurance: [facility]   Phone#:   ID#: [id number]   Group #: [id number]  Spoke with: Epic registration  Reference #: 2/4/21 @ 10:30 am    Calendar Year : yes     Chemo Auth Needed: yes    Annual Deductible Amount:  $0  Amount met to date: $0  Out-of-Pocket Max Amount: $ 4200  Amount met to date: $45  Lifetime Maximum Amount: $unlimited   Amount met to date: $n/a    Information was passed on to [employer], pharmacy tech, to look for carlos/foundation assistance.  Electronically signed by Antonio Brewer on 2/4/2021 at 10:33 AM

## 2021-02-04 NOTE — PATIENT INSTRUCTIONS
Continue daily fractionated radiation therapy as scheduled. Please see weekly OTV note and intial consultation letter in [facility] for clinical details. Ashleigh Timmons. Marcelina Ortiz MD MS Yury Nguyen:  [phone]   FAX: [phone]  [facility]:  [phone]   FAX:    [phone]  [address] [place] [address] Road:  [phone]   FAX:  [phone]  Email: [email or web address]

## 2021-02-04 NOTE — PROGRESS NOTES
DEPARTMENT OF RADIATION ONCOLOGY ON TREATMENT VISIT         2/4/2021      NAME:  Gissell Kramer    YOB: 1955    Diagnosis: breast cancer    SUBJECTIVE:   Gissell Kramer has now received fractionated external beam radiation therapy - ongoing. Past medical, surgical, social and family histories reviewed and updated as indicated. Pain: controlled    ALLERGIES:  Patient has no known allergies. Current Outpatient Medications   Medication Sig Dispense Refill    acetaminophen (TYLENOL) 500 MG tablet Take 1 tablet by mouth 4 times daily as needed for Pain 120 tablet 0    ibuprofen (ADVIL;MOTRIN) 600 MG tablet Take 1 tablet by mouth 4 times daily as needed for Pain 40 tablet 0    levothyroxine (SYNTHROID) 50 MCG tablet Take 50 mcg by mouth Daily       No current facility-administered medications for this encounter. OBJECTIVE:  Alert and fully ambulatory. Pleasant and conversant. Physical Examination: General appearance - alert, well appearing, and in no distress. Wt Readings from Last 3 Encounters:   02/03/21 181 lb 11.2 oz (82.4 kg)   01/27/21 184 lb (83.5 kg)   01/27/21 178 lb 14.4 oz (81.1 kg)         ASSESSMENT/PLAN:     Patient is tolerating treatments well with expected toxicities. RBA were reviewed prior to first fraction and PRN. Current and planned dose reviewed. Goals of treatment and potential side effects were reviewed with the patient PRN. Treatment imaging has been personally reviewed for accuracy and precision. Questions answered to apparent satisfaction. Treatments will continue as planned. Samia Mars.  Opal Urias MD MS DABR  Radiation Oncologist        [facility] ([address] [place] [address]): [phone] /// FAX: [phone]  [facility]): [phone] /// FAX: [phone]  [address] [place], [place] [address]ina Conover): [phone] /// FAX: [phone]

## 2021-02-05 ENCOUNTER — HOSPITAL ENCOUNTER (OUTPATIENT)
Dept: RADIATION ONCOLOGY | Age: 66
Discharge: HOME | End: 2021-02-05
Attending: RADIOLOGY
Payer: MEDICARE

## 2021-02-05 PROCEDURE — 77412 RADIATION TX DELIVERY LVL 3: CPT | Performed by: RADIOLOGY

## 2021-02-08 ENCOUNTER — HOSPITAL ENCOUNTER (OUTPATIENT)
Dept: RADIATION ONCOLOGY | Age: 66
Discharge: HOME | End: 2021-02-08
Attending: RADIOLOGY
Payer: MEDICARE

## 2021-02-08 PROCEDURE — 77412 RADIATION TX DELIVERY LVL 3: CPT | Performed by: RADIOLOGY

## 2021-02-09 ENCOUNTER — HOSPITAL ENCOUNTER (OUTPATIENT)
Dept: RADIATION ONCOLOGY | Age: 66
Discharge: HOME | End: 2021-02-09
Attending: RADIOLOGY
Payer: MEDICARE

## 2021-02-09 PROCEDURE — 77427 RADIATION TX MANAGEMENT X5: CPT | Performed by: RADIOLOGY

## 2021-02-09 PROCEDURE — 77336 RADIATION PHYSICS CONSULT: CPT | Performed by: RADIOLOGY

## 2021-02-09 PROCEDURE — 77412 RADIATION TX DELIVERY LVL 3: CPT | Performed by: RADIOLOGY

## 2021-02-10 ENCOUNTER — HOSPITAL ENCOUNTER (OUTPATIENT)
Dept: RADIATION ONCOLOGY | Age: 66
Discharge: HOME | End: 2021-02-10
Attending: RADIOLOGY
Payer: MEDICARE

## 2021-02-10 VITALS
HEART RATE: 70 BPM | DIASTOLIC BLOOD PRESSURE: 82 MMHG | OXYGEN SATURATION: 99 % | WEIGHT: 182 LBS | BODY MASS INDEX: 30.29 KG/M2 | RESPIRATION RATE: 18 BRPM | SYSTOLIC BLOOD PRESSURE: 134 MMHG

## 2021-02-10 DIAGNOSIS — C50.919 MALIGNANT NEOPLASM OF FEMALE BREAST, UNSPECIFIED ESTROGEN RECEPTOR STATUS, UNSPECIFIED LATERALITY, UNSPECIFIED SITE OF BREAST (HCC): Primary | ICD-10-CM

## 2021-02-10 PROCEDURE — 77412 RADIATION TX DELIVERY LVL 3: CPT | Performed by: RADIOLOGY

## 2021-02-10 PROCEDURE — 77417 THER RADIOLOGY PORT IMAGE(S): CPT | Performed by: RADIOLOGY

## 2021-02-10 PROCEDURE — 99999 PR OFFICE/OUTPT VISIT,PROCEDURE ONLY: CPT | Performed by: RADIOLOGY

## 2021-02-10 NOTE — PROGRESS NOTES
Ravi Escamilla [facility]  2/10/2021  Wt Readings from Last 3 Encounters:   02/03/21 181 lb 11.2 oz (82.4 kg)   01/27/21 184 lb (83.5 kg)   01/27/21 178 lb 14.4 oz (81.1 kg)     There is no height or weight on file to calculate BMI. Treatment Area:left breast    Patient was seen today for weekly visit. Comfort Alteration  KPS:90%  Fatigue: Mild    Nutritional Alteration  Anorexia: No   Nausea: No   Vomiting: No     Skin Alteration   Sensation:na    Radiation Dermatitis:  na    Mucous Membrane Alteration  Drainage: No  Lymphedema: No    Emotional  Coping: effective    Sexuality Alteration  na    Injury, potential bleeding or infection: na        Lab Results   Component Value Date    WBC 5.2 12/18/2020     12/18/2020         There were no vitals taken for this visit. BP within normal range? yes        Assessment/Plan: patient has completed 11/16 fractions, 2926 cGy/4256.      Oc Fraser

## 2021-02-11 ENCOUNTER — HOSPITAL ENCOUNTER (OUTPATIENT)
Dept: RADIATION ONCOLOGY | Age: 66
Discharge: HOME | End: 2021-02-11
Attending: RADIOLOGY
Payer: MEDICARE

## 2021-02-11 PROCEDURE — 77412 RADIATION TX DELIVERY LVL 3: CPT | Performed by: RADIOLOGY

## 2021-02-12 ENCOUNTER — HOSPITAL ENCOUNTER (OUTPATIENT)
Dept: RADIATION ONCOLOGY | Age: 66
Discharge: HOME | End: 2021-02-12
Attending: RADIOLOGY
Payer: MEDICARE

## 2021-02-12 PROCEDURE — 77412 RADIATION TX DELIVERY LVL 3: CPT | Performed by: RADIOLOGY

## 2021-02-15 ENCOUNTER — HOSPITAL ENCOUNTER (OUTPATIENT)
Dept: RADIATION ONCOLOGY | Age: 66
Discharge: HOME | End: 2021-02-15
Attending: RADIOLOGY
Payer: MEDICARE

## 2021-02-15 PROCEDURE — 77412 RADIATION TX DELIVERY LVL 3: CPT | Performed by: RADIOLOGY

## 2021-02-16 ENCOUNTER — HOSPITAL ENCOUNTER (OUTPATIENT)
Dept: RADIATION ONCOLOGY | Age: 66
Discharge: HOME | End: 2021-02-16
Attending: RADIOLOGY
Payer: MEDICARE

## 2021-02-16 PROCEDURE — 77336 RADIATION PHYSICS CONSULT: CPT | Performed by: RADIOLOGY

## 2021-02-16 PROCEDURE — 77427 RADIATION TX MANAGEMENT X5: CPT | Performed by: RADIOLOGY

## 2021-02-16 PROCEDURE — 77412 RADIATION TX DELIVERY LVL 3: CPT | Performed by: RADIOLOGY

## 2021-02-17 ENCOUNTER — OFFICE VISIT (OUTPATIENT)
Dept: ONCOLOGY | Age: 66
End: 2021-02-17
Payer: MEDICARE

## 2021-02-17 ENCOUNTER — HOSPITAL ENCOUNTER (OUTPATIENT)
Dept: RADIATION ONCOLOGY | Age: 66
Discharge: HOME | End: 2021-02-17
Attending: RADIOLOGY
Payer: MEDICARE

## 2021-02-17 ENCOUNTER — HOSPITAL ENCOUNTER (OUTPATIENT)
Dept: INFUSION THERAPY | Age: 66
Discharge: HOME | End: 2021-02-17
Payer: MEDICARE

## 2021-02-17 VITALS
SYSTOLIC BLOOD PRESSURE: 181 MMHG | OXYGEN SATURATION: 98 % | TEMPERATURE: 96 F | HEIGHT: 65 IN | BODY MASS INDEX: 30.44 KG/M2 | HEART RATE: 62 BPM | DIASTOLIC BLOOD PRESSURE: 74 MMHG | WEIGHT: 182.7 LBS

## 2021-02-17 VITALS
DIASTOLIC BLOOD PRESSURE: 72 MMHG | RESPIRATION RATE: 18 BRPM | BODY MASS INDEX: 30.59 KG/M2 | HEART RATE: 78 BPM | SYSTOLIC BLOOD PRESSURE: 118 MMHG | TEMPERATURE: 97.1 F | OXYGEN SATURATION: 97 % | WEIGHT: 183.8 LBS

## 2021-02-17 DIAGNOSIS — C50.919 MALIGNANT NEOPLASM OF FEMALE BREAST, UNSPECIFIED ESTROGEN RECEPTOR STATUS, UNSPECIFIED LATERALITY, UNSPECIFIED SITE OF BREAST (HCC): Primary | ICD-10-CM

## 2021-02-17 DIAGNOSIS — Z85.3 PERSONAL HISTORY OF BREAST CANCER: Primary | ICD-10-CM

## 2021-02-17 DIAGNOSIS — Z85.3 PERSONAL HISTORY OF BREAST CANCER: ICD-10-CM

## 2021-02-17 LAB
ALBUMIN SERPL-MCNC: 4.2 G/DL (ref 3.5–5.2)
ALP BLD-CCNC: 70 U/L (ref 35–104)
ALT SERPL-CCNC: 16 U/L (ref 0–32)
ANION GAP SERPL CALCULATED.3IONS-SCNC: 8 MMOL/L (ref 7–16)
AST SERPL-CCNC: 18 U/L (ref 0–31)
BASOPHILS ABSOLUTE: 0.02 E9/L (ref 0–0.2)
BASOPHILS RELATIVE PERCENT: 0.4 % (ref 0–2)
BILIRUB SERPL-MCNC: 0.3 MG/DL (ref 0–1.2)
BUN BLDV-MCNC: 15 MG/DL (ref 8–23)
CALCIUM SERPL-MCNC: 9.3 MG/DL (ref 8.6–10.2)
CHLORIDE BLD-SCNC: 103 MMOL/L (ref 98–107)
CO2: 29 MMOL/L (ref 22–29)
CREAT SERPL-MCNC: 1.3 MG/DL (ref 0.5–1)
EOSINOPHILS ABSOLUTE: 0.04 E9/L (ref 0.05–0.5)
EOSINOPHILS RELATIVE PERCENT: 0.8 % (ref 0–6)
GFR AFRICAN AMERICAN: 49
GFR NON-AFRICAN AMERICAN: 41 ML/MIN/1.73
GLUCOSE BLD-MCNC: 100 MG/DL (ref 74–99)
HCT VFR BLD CALC: 39.6 % (ref 34–48)
HEMOGLOBIN: 13 G/DL (ref 11.5–15.5)
IMMATURE GRANULOCYTES #: 0.02 E9/L
IMMATURE GRANULOCYTES %: 0.4 % (ref 0–5)
LYMPHOCYTES ABSOLUTE: 0.77 E9/L (ref 1.5–4)
LYMPHOCYTES RELATIVE PERCENT: 15.5 % (ref 20–42)
MCH RBC QN AUTO: 30.7 PG (ref 26–35)
MCHC RBC AUTO-ENTMCNC: 32.8 % (ref 32–34.5)
MCV RBC AUTO: 93.6 FL (ref 80–99.9)
MONOCYTES ABSOLUTE: 0.48 E9/L (ref 0.1–0.95)
MONOCYTES RELATIVE PERCENT: 9.6 % (ref 2–12)
NEUTROPHILS ABSOLUTE: 3.65 E9/L (ref 1.8–7.3)
NEUTROPHILS RELATIVE PERCENT: 73.3 % (ref 43–80)
PDW BLD-RTO: 12.3 FL (ref 11.5–15)
PLATELET # BLD: 258 E9/L (ref 130–450)
PMV BLD AUTO: 9.8 FL (ref 7–12)
POTASSIUM SERPL-SCNC: 4.3 MMOL/L (ref 3.5–5)
RBC # BLD: 4.23 E12/L (ref 3.5–5.5)
SODIUM BLD-SCNC: 140 MMOL/L (ref 132–146)
TOTAL PROTEIN: 7.2 G/DL (ref 6.4–8.3)
WBC # BLD: 5 E9/L (ref 4.5–11.5)

## 2021-02-17 PROCEDURE — 99999 PR OFFICE/OUTPT VISIT,PROCEDURE ONLY: CPT | Performed by: RADIOLOGY

## 2021-02-17 PROCEDURE — 99214 OFFICE O/P EST MOD 30 MIN: CPT | Performed by: INTERNAL MEDICINE

## 2021-02-17 PROCEDURE — 80053 COMPREHEN METABOLIC PANEL: CPT

## 2021-02-17 PROCEDURE — 77412 RADIATION TX DELIVERY LVL 3: CPT | Performed by: RADIOLOGY

## 2021-02-17 PROCEDURE — 85025 COMPLETE CBC W/AUTO DIFF WBC: CPT

## 2021-02-17 PROCEDURE — 99212 OFFICE O/P EST SF 10 MIN: CPT

## 2021-02-17 PROCEDURE — 36415 COLL VENOUS BLD VENIPUNCTURE: CPT

## 2021-02-17 RX ORDER — ANASTROZOLE 1 MG/1
1 TABLET ORAL DAILY
Qty: 30 TABLET | Refills: 0 | Status: SHIPPED
Start: 2021-02-18 | End: 2021-03-17 | Stop reason: SDUPTHER

## 2021-02-17 NOTE — PROGRESS NOTES
Moe Bowensoumar [facility]  2/17/2021  Wt Readings from Last 3 Encounters:   02/17/21 183 lb 12.8 oz (83.4 kg)   02/10/21 182 lb (82.6 kg)   02/03/21 181 lb 11.2 oz (82.4 kg)     Body mass index is 30.59 kg/m². Treatment Area: Left breast ABC    Patient was seen today for weekly visit. Comfort Alteration  KPS:80%  Fatigue: moderate    Nutritional Alteration  Anorexia: No   Nausea: No   Vomiting: No     Skin Alteration   Sensation:red and using lotion    Radiation Dermatitis:  na    Mucous Membrane Alteration  Drainage: No  Lymphedema: No    Emotional  Coping: effective    Sexuality Alteration  na    Injury, potential bleeding or infection: na        Lab Results   Component Value Date    WBC 5.2 12/18/2020     12/18/2020         /72   Pulse 78   Temp 97.1 °F (36.2 °C) (Skin)   Resp 18   Wt 183 lb 12.8 oz (83.4 kg)   SpO2 97%   BMI 30.59 kg/m²   BP within normal range?  yes          Assessment/Plan:  Finishing 16fx  [id number]    Gina Huber

## 2021-02-17 NOTE — PROGRESS NOTES
Department of [facility] Oncology   Attending Clinic Note    Reason for Visit: Follow-up on a patient with Left Breast Cancer    PCP:  Robert Willis DO    History of Present Illness:  77year old female found to have lesion in upper inner quadrant of left breast    OBSTETRIC RELATED HISTORY:   Age of menarche was 13. Age of menopause was 53. Patient denies hormonal therapy. Patient is . Age of first live birth was 30. Patient did not breast feed. Is patient interested in fertility information about fertility preservation? No     Left breast US 2020:  1.4 cm oval nodule with indistinct and microlobulated margin left breast upper inner aspect middle depth. Suspicious of malignancy follow-up recommended    US guided left breast core biopsy 2020:  Scattered fibroglandular elements in both breasts  New 8 mm irregular density with indistinct margin in left breast at 12 oclock   -Atypical papillary lesion favor intracystic papillary carcinoma  -Atypical lobular hyperplasia with focal minute microcalcification  -Immunostaining for ER is +95% and GA is +95%  -CK 5/6 is negative and epithelial cells. E-cadherin is positive in the papillary lesion and negative in the atypical lobular hyperplasia. Left breast Needle Localized Lumpectomy on 2020: Invasive lobular and invasive ductal carcinoma with foci of lobular carcinoma in situ, ductal carcinoma in situ, low-grade, cribriform type and encapsulated papillary carcinoma. Margins of excision free of carcinoma    Comment:   The lumpectomy specimen contains a mixed invasive lobular (E-cadherin negative, 1.2 x 0.6 x 0.5 cm) and ductal carcinoma (E-cadherin positive, 1.5 x 1.3 x 1.1 cm).     The invasive lobular component has a Thorp score of 3+1+1 = 5 with adjacent areas of   lobular carcinoma in situ.  The invasive lobular and LCIS come to within 0.5 mm of the inked lateral margin of excision.    The invasive ductal carcinoma has a Benham score of 2+1+1 = 4 and has adjacent areas of   ductal carcinoma in situ, cribriform type, low nuclear grade as well as a 0.5 cm area of encapsulated papillary carcinoma (p63 negative for myoepithelial cells).    The remaining breast tissue shows focal intraductal papillomata as well as focal microcalcifications. Breast Cancer Marker Studies:   Estrogen Receptors (ER): 90%   Progesterone Receptors (FL): 90%   Her-2/lupillo (c-erb B-2) protein expression: Negative (0)      Clinical Stage I left breast cancer. Left axillary sentinel lymph node biopsy on 12/18/2020:  A.  [place] lymph node #1, biopsy: Four (4) reactive nodes, negative for malignancy. B.  [place] lymph node #2, biopsy: One (1) reactive node, negative for malignancy. C.  [place] lymph node #3, biopsy: Two (2) reactive nodes, negative for malignancy. pT1N0 M0 ER+FL+ HER2 -  Oncotype recurrence score: 0  Distant recurrence risk at 9 years with AI or PEREZ alone- 3%   Absolute chemotherapy benefit RS 0-10 All Ages: <1%  Recommended adjuvant RT followed by adjuvant endocrine therapy (Arimidex 1 mg po daily for 5 years). RT was started on 01/27/2021 and completed today 02/17/2021. Review of Systems;  CONSTITUTIONAL: No fever, chills. Fair appetite and energy level  ENMT: Eyes: No diplopia; Nose: No epistaxis. Mouth: No sore throat. RESPIRATORY: No hemoptysis, shortness of breath, cough. CARDIOVASCULAR: No chest pain, palpitations. GASTROINTESTINAL: No nausea/vomiting, abdominal pain  GENITOURINARY: No dysuria, urinary frequency, hematuria. NEURO: No syncope, presyncope, headache. Remainder:  ROS NEGATIVE    Past Medical History:      Diagnosis Date    Allergic rhinitis     seasonal    Cancer ([facility] [address]     Hypothyroidism      Medications:  Reviewed and reconciled.     Allergies:  No Known Allergies    Physical Exam:  BP (!) 181/74 (Site: Right Upper Arm, Position: Sitting, Cuff Size: Medium Adult)   Pulse 62   Temp 96 °F (35.6 °C)   Ht 5' 5\" (1.651 m)   Wt 182 lb 11.2 oz (82.9 kg)   SpO2 98%   BMI 30.40 kg/m²   GENERAL: Alert, oriented x 3,not in distress  HEENT: PERRLA; EOMI. Oropharynx clear. NECK: Supple. Without lymphadenopathy. LUNGS: Good air entry bilaterally. No wheezing, crackles or ronchi. CARDIOVASCULAR: Regular rate. No murmurs, rubs or gallops. ABDOMEN: Soft. Non-tender, non-distended. Positive bowel sounds. EXTREMITIES: Without clubbing, cyanosis, or edema. NEUROLOGIC: No focal deficits. ECOG PS 1    Impression/Plan:  76 y/o female who underwent Left breast Needle Localized Lumpectomy on 12/4/2020: Invasive lobular and invasive ductal carcinoma with foci of lobular carcinoma in situ, ductal carcinoma in situ, low-grade, cribriform type and encapsulated papillary carcinoma. Margins of excision free of carcinoma    Comment:   The lumpectomy specimen contains a mixed invasive lobular (E-cadherin negative, 1.2 x 0.6 x 0.5 cm) and ductal carcinoma (E-cadherin positive, 1.5 x 1.3 x 1.1 cm).    The invasive lobular component has a [place] score of 3+1+1 = 5 with adjacent areas of   lobular carcinoma in situ.  The invasive lobular and LCIS come to within 0.5 mm of the inked lateral margin of excision.    The invasive ductal carcinoma has a Waldo score of 2+1+1 = 4 and has adjacent areas of   ductal carcinoma in situ, cribriform type, low nuclear grade as well as a 0.5 cm area of encapsulated papillary carcinoma (p63 negative for myoepithelial cells).    The remaining breast tissue shows focal intraductal papillomata as well as focal microcalcifications. Breast Cancer Marker Studies:   Estrogen Receptors (ER): 90%   Progesterone Receptors (PA): 90%   Her-2/lupillo (c-erb B-2) protein expression: Negative (0)      Left axillary sentinel lymph node biopsy on 12/18/2020:  A.  [place] lymph node #1, biopsy: Four (4) reactive nodes, negative for malignancy.    B.  [place] lymph node #2, biopsy: One (1) reactive node, negative for malignancy. C.  [place] lymph node #3, biopsy: Two (2) reactive nodes, negative for malignancy. pT1N0 M0 ER+ [place] + HER/2 -  Oncotype recurrence score: 0  Distant recurrence risk at 9 years with AI or PEREZ alone- 3%   Absolute chemotherapy benefit RS 0-10 All Ages: <1%    Pathology report and NCCN guidelines reviewed with patient. No indication for adjuvant chemotherapy. Recommended adjuvant RT followed by adjuvant endocrine therapy (Arimidex 1 mg po daily for 5 years). RT was started on 01/27/2021 and completed today 02/17/2021. Arimidex 1 mg po daily will be started on 02/18/2021. Side effects of Arimidex reviewed with patient. She agreed to proceed. Ca/VitD while on Arimidex. DEXA scan done at her PCP's office in [facility] 08/30/2020 Normal bone mineral density. Imaging reviewed.     RTC 4 weeks for Arimidex toxicity check    02/17/2021  Yahaira Rowe MD

## 2021-02-17 NOTE — PROGRESS NOTES
Hector Prieto [facility]  2/17/2021  7:32 AM          Current Outpatient Medications   Medication Sig Dispense Refill    acetaminophen (TYLENOL) 500 MG tablet Take 1 tablet by mouth 4 times daily as needed for Pain 120 tablet 0    ibuprofen (ADVIL;MOTRIN) 600 MG tablet Take 1 tablet by mouth 4 times daily as needed for Pain 40 tablet 0    levothyroxine (SYNTHROID) 50 MCG tablet Take 50 mcg by mouth Daily       No current facility-administered medications for this encounter. This is an up-to-date medication list.    Please take this list to your next care provider, and discard any previous medication lists.

## 2021-02-18 NOTE — PATIENT INSTRUCTIONS
Continue daily fractionated radiation therapy as scheduled. Please see weekly OTV note and intial consultation letter in [facility] for clinical details. Romel Stephen. Jax Jacob MD MS [address]:  [phone]   FAX: [phone]  [address]:  [phone]   FAX:    [phone] [address]:  [phone]   FAX:  [phone]  Email: [email or web address]

## 2021-02-18 NOTE — PROGRESS NOTES
DEPARTMENT OF RADIATION ONCOLOGY ON TREATMENT VISIT         2/18/2021      NAME:  Mario Vann    YOB: 1955    Diagnosis: breast cancer    SUBJECTIVE:   Mario Vann has now received fractionated external beam radiation therapy - ongoing. Past medical, surgical, social and family histories reviewed and updated as indicated. Pain: controlled    ALLERGIES:  Patient has no known allergies. Current Outpatient Medications   Medication Sig Dispense Refill    anastrozole (ARIMIDEX) 1 MG tablet Take 1 tablet by mouth daily 30 tablet 0    acetaminophen (TYLENOL) 500 MG tablet Take 1 tablet by mouth 4 times daily as needed for Pain 120 tablet 0    ibuprofen (ADVIL;MOTRIN) 600 MG tablet Take 1 tablet by mouth 4 times daily as needed for Pain 40 tablet 0    levothyroxine (SYNTHROID) 50 MCG tablet Take 50 mcg by mouth Daily       No current facility-administered medications for this encounter. OBJECTIVE:  Alert and fully ambulatory. Pleasant and conversant. Physical Examination: General appearance - alert, well appearing, and in no distress. Wt Readings from Last 3 Encounters:   02/17/21 182 lb 11.2 oz (82.9 kg)   02/17/21 183 lb 12.8 oz (83.4 kg)   02/10/21 182 lb (82.6 kg)         ASSESSMENT/PLAN:     Patient is tolerating treatments well with expected toxicities. RBA were reviewed prior to first fraction and PRN. Current and planned dose reviewed. Goals of treatment and potential side effects were reviewed with the patient PRN. Treatment imaging has been personally reviewed for accuracy and precision. Questions answered to apparent satisfaction. Treatments will continue as planned. Annamarie Bai.  Alan Reagan MD MS EPIR  Radiation Oncologist        [facility] ([address] [place] [address]): [phone] /// FAX: [phone]  [facility]): [phone] /// FAX: [phone]  [address] [place] [address]): [phone] /// FAX: [phone]

## 2021-02-18 NOTE — PATIENT INSTRUCTIONS
Continue daily fractionated radiation therapy as scheduled. Please see weekly OTV note and intial consultation letter in [facility] for clinical details. Shelton Tyson. Kenzie Avery MD MS [address]:  [phone]   FAX: [phone]  [facility]:  [phone]   FAX:    [phone]  [facility]:  [phone]   FAX:  [phone]  Email: [email or web address]

## 2021-02-18 NOTE — PROGRESS NOTES
DEPARTMENT OF RADIATION ONCOLOGY ON TREATMENT VISIT         2/10/21      NAME:  Ophelia Rouse    YOB: 1955    Diagnosis: breast cancer    SUBJECTIVE:   Ophelia Rouse has now received fractionated external beam radiation therapy - ongoing. Past medical, surgical, social and family histories reviewed and updated as indicated. Pain: controlled    ALLERGIES:  Patient has no known allergies. Current Outpatient Medications   Medication Sig Dispense Refill    anastrozole (ARIMIDEX) 1 MG tablet Take 1 tablet by mouth daily 30 tablet 0    acetaminophen (TYLENOL) 500 MG tablet Take 1 tablet by mouth 4 times daily as needed for Pain 120 tablet 0    ibuprofen (ADVIL;MOTRIN) 600 MG tablet Take 1 tablet by mouth 4 times daily as needed for Pain 40 tablet 0    levothyroxine (SYNTHROID) 50 MCG tablet Take 50 mcg by mouth Daily       No current facility-administered medications for this encounter. OBJECTIVE:  Alert and fully ambulatory. Pleasant and conversant. Physical Examination: General appearance - alert, well appearing, and in no distress. Wt Readings from Last 3 Encounters:   02/17/21 182 lb 11.2 oz (82.9 kg)   02/17/21 183 lb 12.8 oz (83.4 kg)   02/10/21 182 lb (82.6 kg)         ASSESSMENT/PLAN:     Patient is tolerating treatments well with expected toxicities. RBA were reviewed prior to first fraction and PRN. Current and planned dose reviewed. Goals of treatment and potential side effects were reviewed with the patient PRN. Treatment imaging has been personally reviewed for accuracy and precision. Questions answered to apparent satisfaction. Treatments will continue as planned. Dusty Mondragon.  Tamanna Patel MD MS ISADORA  Radiation Oncologist        [facility]): [phone] /// FAX: [phone]  [facility]): [phone] /// FAX: [phone]  [address] [place] [address]): [phone] /// FAX: [phone]

## 2021-02-24 ENCOUNTER — TELEPHONE (OUTPATIENT)
Dept: PHARMACY | Age: 66
End: 2021-02-24

## 2021-02-24 NOTE — TELEPHONE ENCOUNTER
Received a referral from Gali Gao to see if I could find a carlos or foundation open to assist with co-pay support. Unfortunately at this time there are no open grants of funding.

## 2021-03-16 DIAGNOSIS — Z85.3 PERSONAL HISTORY OF BREAST CANCER: Primary | ICD-10-CM

## 2021-03-17 ENCOUNTER — OFFICE VISIT (OUTPATIENT)
Dept: ONCOLOGY | Age: 66
End: 2021-03-17
Payer: MEDICARE

## 2021-03-17 ENCOUNTER — HOSPITAL ENCOUNTER (OUTPATIENT)
Dept: INFUSION THERAPY | Age: 66
Discharge: HOME | End: 2021-03-17
Payer: MEDICARE

## 2021-03-17 VITALS
HEIGHT: 65 IN | WEIGHT: 180 LBS | DIASTOLIC BLOOD PRESSURE: 74 MMHG | SYSTOLIC BLOOD PRESSURE: 157 MMHG | BODY MASS INDEX: 29.99 KG/M2 | OXYGEN SATURATION: 98 % | TEMPERATURE: 97.3 F | HEART RATE: 57 BPM

## 2021-03-17 DIAGNOSIS — Z85.3 PERSONAL HISTORY OF BREAST CANCER: ICD-10-CM

## 2021-03-17 DIAGNOSIS — Z85.3 PERSONAL HISTORY OF BREAST CANCER: Primary | ICD-10-CM

## 2021-03-17 LAB
ALBUMIN SERPL-MCNC: 4.2 G/DL (ref 3.5–5.2)
ALP BLD-CCNC: 83 U/L (ref 35–104)
ALT SERPL-CCNC: 10 U/L (ref 0–32)
ANION GAP SERPL CALCULATED.3IONS-SCNC: 11 MMOL/L (ref 7–16)
AST SERPL-CCNC: 15 U/L (ref 0–31)
BASOPHILS ABSOLUTE: 0.04 E9/L (ref 0–0.2)
BASOPHILS RELATIVE PERCENT: 0.8 % (ref 0–2)
BILIRUB SERPL-MCNC: 0.2 MG/DL (ref 0–1.2)
BUN BLDV-MCNC: 18 MG/DL (ref 8–23)
CALCIUM SERPL-MCNC: 9.9 MG/DL (ref 8.6–10.2)
CHLORIDE BLD-SCNC: 102 MMOL/L (ref 98–107)
CO2: 26 MMOL/L (ref 22–29)
CREAT SERPL-MCNC: 1.1 MG/DL (ref 0.5–1)
EOSINOPHILS ABSOLUTE: 0.03 E9/L (ref 0.05–0.5)
EOSINOPHILS RELATIVE PERCENT: 0.6 % (ref 0–6)
GFR AFRICAN AMERICAN: 60
GFR NON-AFRICAN AMERICAN: 49 ML/MIN/1.73
GLUCOSE BLD-MCNC: 100 MG/DL (ref 74–99)
HCT VFR BLD CALC: 38.6 % (ref 34–48)
HEMOGLOBIN: 12.8 G/DL (ref 11.5–15.5)
IMMATURE GRANULOCYTES #: 0.01 E9/L
IMMATURE GRANULOCYTES %: 0.2 % (ref 0–5)
LYMPHOCYTES ABSOLUTE: 0.98 E9/L (ref 1.5–4)
LYMPHOCYTES RELATIVE PERCENT: 19.1 % (ref 20–42)
MCH RBC QN AUTO: 30.7 PG (ref 26–35)
MCHC RBC AUTO-ENTMCNC: 33.2 % (ref 32–34.5)
MCV RBC AUTO: 92.6 FL (ref 80–99.9)
MONOCYTES ABSOLUTE: 0.4 E9/L (ref 0.1–0.95)
MONOCYTES RELATIVE PERCENT: 7.8 % (ref 2–12)
NEUTROPHILS ABSOLUTE: 3.67 E9/L (ref 1.8–7.3)
NEUTROPHILS RELATIVE PERCENT: 71.5 % (ref 43–80)
PDW BLD-RTO: 12 FL (ref 11.5–15)
PLATELET # BLD: 281 E9/L (ref 130–450)
PMV BLD AUTO: 9.5 FL (ref 7–12)
POTASSIUM SERPL-SCNC: 4.3 MMOL/L (ref 3.5–5)
RBC # BLD: 4.17 E12/L (ref 3.5–5.5)
SODIUM BLD-SCNC: 139 MMOL/L (ref 132–146)
TOTAL PROTEIN: 7.4 G/DL (ref 6.4–8.3)
WBC # BLD: 5.1 E9/L (ref 4.5–11.5)

## 2021-03-17 PROCEDURE — 99212 OFFICE O/P EST SF 10 MIN: CPT

## 2021-03-17 PROCEDURE — 99214 OFFICE O/P EST MOD 30 MIN: CPT | Performed by: INTERNAL MEDICINE

## 2021-03-17 PROCEDURE — 85025 COMPLETE CBC W/AUTO DIFF WBC: CPT

## 2021-03-17 PROCEDURE — 80053 COMPREHEN METABOLIC PANEL: CPT

## 2021-03-17 RX ORDER — VENLAFAXINE HYDROCHLORIDE 37.5 MG/1
37.5 CAPSULE, EXTENDED RELEASE ORAL DAILY
Qty: 30 CAPSULE | Refills: 0 | Status: SHIPPED
Start: 2021-03-17 | End: 2021-03-30

## 2021-03-17 RX ORDER — ANASTROZOLE 1 MG/1
1 TABLET ORAL DAILY
Qty: 30 TABLET | Refills: 0 | Status: SHIPPED
Start: 2021-03-17 | End: 2021-04-14 | Stop reason: SDUPTHER

## 2021-03-17 NOTE — PROGRESS NOTES
Department of [facility] Oncology   Attending Clinic Note    Reason for Visit: Follow-up on a patient with Left Breast Cancer    PCP:  Vika Armas DO    History of Present Illness:  77year old female found to have lesion in upper inner quadrant of left breast    OBSTETRIC RELATED HISTORY:   Age of menarche was 13. Age of menopause was 53. Patient denies hormonal therapy. Patient is . Age of first live birth was 30. Patient did not breast feed. Is patient interested in fertility information about fertility preservation? No     Left breast US 2020:  1.4 cm oval nodule with indistinct and microlobulated margin left breast upper inner aspect middle depth. Suspicious of malignancy follow-up recommended    US guided left breast core biopsy 2020:  Scattered fibroglandular elements in both breasts  New 8 mm irregular density with indistinct margin in left breast at 12 oclock   -Atypical papillary lesion favor intracystic papillary carcinoma  -Atypical lobular hyperplasia with focal minute microcalcification  -Immunostaining for ER is +95% and [place] is +95%  -CK 5/6 is negative and epithelial cells. E-cadherin is positive in the papillary lesion and negative in the atypical lobular hyperplasia. Left breast Needle Localized Lumpectomy on 2020: Invasive lobular and invasive ductal carcinoma with foci of lobular carcinoma in situ, ductal carcinoma in situ, low-grade, cribriform type and encapsulated papillary carcinoma. Margins of excision free of carcinoma    Comment:   The lumpectomy specimen contains a mixed invasive lobular (E-cadherin negative, 1.2 x 0.6 x 0.5 cm) and ductal carcinoma (E-cadherin positive, 1.5 x 1.3 x 1.1 cm).     The invasive lobular component has a Mil[place] score of 3+1+1 = 5 with adjacent areas of   lobular carcinoma in situ.  The invasive lobular and LCIS come to within 0.5 mm of the inked lateral margin of excision.    The invasive ductal carcinoma has a [place] score of 2+1+1 = 4 and has adjacent areas of   ductal carcinoma in situ, cribriform type, low nuclear grade as well as a 0.5 cm area of encapsulated papillary carcinoma (p63 negative for myoepithelial cells).    The remaining breast tissue shows focal intraductal papillomata as well as focal microcalcifications. Breast Cancer Marker Studies:   Estrogen Receptors (ER): 90%   Progesterone Receptors (MD): 90%   Her-2/lupillo (c-erb B-2) protein expression: Negative (0)      Clinical Stage I left breast cancer. Left axillary sentinel lymph node biopsy on 12/18/2020:  A.  [place] lymph node #1, biopsy: Four (4) reactive nodes, negative for malignancy. B.  [place] lymph node #2, biopsy: One (1) reactive node, negative for malignancy. C.  [place] lymph node #3, biopsy: Two (2) reactive nodes, negative for malignancy. pT1N0 M0 ER+MD+ HER2 -  Oncotype recurrence score: 0  Distant recurrence risk at 9 years with AI or PEREZ alone- 3%   Absolute chemotherapy benefit RS 0-10 All Ages: <1%  Recommended adjuvant RT followed by adjuvant endocrine therapy (Arimidex 1 mg po daily for 5 years). RT was started on 01/27/2021 and completed 02/17/2021. DEXA scan done at her PCP's office in [facility] 08/30/2020 Normal bone mineral density. Imaging reviewed. Arimidex 1 mg po daily was started on 02/18/2021. Significant night sweats affecting her sleep and quality of life    Review of Systems;  CONSTITUTIONAL: Significant night sweats affecting her sleep and quality of life  ENMT: Eyes: No diplopia; Nose: No epistaxis. Mouth: No sore throat. RESPIRATORY: No hemoptysis, shortness of breath, cough. CARDIOVASCULAR: No chest pain, palpitations. GASTROINTESTINAL: No nausea/vomiting, abdominal pain  GENITOURINARY: No dysuria, urinary frequency, hematuria. NEURO: No syncope, presyncope, headache.   Remainder:  ROS NEGATIVE    Past Medical History:      Diagnosis Date    Allergic rhinitis     seasonal    Cancer ([facility] [address]     Hypothyroidism      Medications:  Reviewed and reconciled. Allergies:  No Known Allergies    Physical Exam:  BP (!) 157/74 (Site: Right Upper Arm, Position: Sitting, Cuff Size: Medium Adult)   Pulse 57   Temp 97.3 °F (36.3 °C) (Temporal)   Ht 5' 5\" (1.651 m)   Wt 180 lb (81.6 kg)   SpO2 98%   BMI 29.95 kg/m²   GENERAL: Alert, oriented x 3,tired  HEENT: PERRLA; EOMI. Oropharynx clear. NECK: Supple. Without lymphadenopathy. LUNGS: Good air entry bilaterally. No wheezing, crackles or ronchi. CARDIOVASCULAR: Regular rate. No murmurs, rubs or gallops. ABDOMEN: Soft. Non-tender, non-distended. Positive bowel sounds. EXTREMITIES: Without clubbing, cyanosis, or edema. NEUROLOGIC: No focal deficits. ECOG PS 1    Impression/Plan:  78 y/o female who underwent Left breast Needle Localized Lumpectomy on 12/4/2020: Invasive lobular and invasive ductal carcinoma with foci of lobular carcinoma in situ, ductal carcinoma in situ, low-grade, cribriform type and encapsulated papillary carcinoma. Margins of excision free of carcinoma    Comment:   The lumpectomy specimen contains a mixed invasive lobular (E-cadherin negative, 1.2 x 0.6 x 0.5 cm) and ductal carcinoma (E-cadherin positive, 1.5 x 1.3 x 1.1 cm).    The invasive lobular component has a Waldo score of 3+1+1 = 5 with adjacent areas of   lobular carcinoma in situ.  The invasive lobular and LCIS come to within 0.5 mm of the inked lateral margin of excision.    The invasive ductal carcinoma has a Waldo score of 2+1+1 = 4 and has adjacent areas of   ductal carcinoma in situ, cribriform type, low nuclear grade as well as a 0.5 cm area of encapsulated papillary carcinoma (p63 negative for myoepithelial cells).    The remaining breast tissue shows focal intraductal papillomata as well as focal microcalcifications.      Breast Cancer Marker Studies:   Estrogen Receptors (ER): 90%   Progesterone Receptors (NV): 90%   Her-2/lupillo (c-erb

## 2021-03-30 ENCOUNTER — TELEPHONE (OUTPATIENT)
Dept: CASE MANAGEMENT | Age: 66
End: 2021-03-30

## 2021-03-30 ENCOUNTER — HOSPITAL ENCOUNTER (OUTPATIENT)
Dept: RADIATION ONCOLOGY | Age: 66
Discharge: HOME | End: 2021-03-30
Attending: RADIOLOGY
Payer: MEDICARE

## 2021-03-30 VITALS
DIASTOLIC BLOOD PRESSURE: 78 MMHG | BODY MASS INDEX: 30.3 KG/M2 | HEART RATE: 58 BPM | OXYGEN SATURATION: 98 % | RESPIRATION RATE: 18 BRPM | WEIGHT: 182.1 LBS | SYSTOLIC BLOOD PRESSURE: 130 MMHG

## 2021-03-30 DIAGNOSIS — Z17.0 MALIGNANT NEOPLASM OF LEFT BREAST IN FEMALE, ESTROGEN RECEPTOR POSITIVE, UNSPECIFIED SITE OF BREAST (HCC): Primary | ICD-10-CM

## 2021-03-30 DIAGNOSIS — C80.1 CANCER (HCC): ICD-10-CM

## 2021-03-30 DIAGNOSIS — C50.912 MALIGNANT NEOPLASM OF LEFT BREAST IN FEMALE, ESTROGEN RECEPTOR POSITIVE, UNSPECIFIED SITE OF BREAST (HCC): Primary | ICD-10-CM

## 2021-03-30 PROCEDURE — 99999 PR OFFICE/OUTPT VISIT,PROCEDURE ONLY: CPT | Performed by: NURSE PRACTITIONER

## 2021-03-30 NOTE — PROGRESS NOTES
Encounters:   03/30/21 182 lb 1.6 oz (82.6 kg)   03/17/21 180 lb (81.6 kg)   02/17/21 182 lb 11.2 oz (82.9 kg)       Alert and fully ambulatory. Pleasant and conversant. Irradiated skin shows mild hyperpigmentation. No redness/swelling/warmth noted. HR reg, rhythm reg. Lungs CTA. ASSESSMENT/PLAN:     Left breast cancer (ER/FL+, Her2-), s/p BCS and adjuvant XRT. The patient underwent a course of radiation therapy to the left breast, which was completed on 2/1721. She completed 4256 cGy in 16 fractions. Patient is doing well post-radiation completion. Skin care and sun care reviewed. Signs and symptoms of recurrence reviewed. Encouraged monthly self breast exams. Imaging per med onc/ breast surgery. Left breast pain: Notes occasional breast pain at nipple incisional site and asking if this is normal. Described as a \"zing\" that comes just as fast as it leaves. This is common post breast surgery followed by radiation therapy as severed nerve endings are irritated by radiation. As time goes on, as radiation wears off, pain should become less and less. If pain becomes worse or persistent, or if patient notes any redness/ heart or swelling to breast, encouraged patient to make providers aware. Cont to follow with Dr Emmanuel Maria for medical oncology. Started on Arimidex with increased night sweats which is causing sleep disturbances. Dr Emmanuel Maria had ordered Effexor but patient is reluctant to take it d/t listed possible side effects. Trying to tolerate night sweats as best she can as she does not want to take the Effexor at this time. Next appt 4/14/21. Cont to follow with AMBROSE Medina for breast surgery. Next appt 9/9/21 with mammogram prior. I discussed follow up plans with Santo Samuel. At this time, I will see the patient back in 6 months for a post-radiation completion follow-up visit.  Santo Samuel is to follow up with other providers involved in their care as directed (including but not limited to Medical Oncology, Primary Care, Pulmonary, and Surgery). The patient was given our contact number in the event that if at any time they change their mind and would like to return to the clinic to see either myself or one of the Radiation Oncologists, they can simply call us and we would be happy to see them sooner. Thank you for involving us in the management of this extremely pleasant patient. More than 15 min was in direct contact with pt coordinating/giving care. >50% of the visit was spent in counseling the pt on the following: Follow up care    The nurses notes were reviewed and incorporated into this assessment and plan. Questions answered to apparent satisfaction.       Ibrahima Adrian, MSN, RN, APRN-CNP  Nurse Practitioner for Radiation Oncology    [facility]) [facility]: [phone] ([facility]: [phone])  [facility]: [phone] ([facility]: [phone])  [address] [place] [address]) [facility]:  [phone] ([facility]:  [phone])

## 2021-03-30 NOTE — PROGRESS NOTES
Amy Martel  3/30/2021  1:58 PM      Vitals:    03/30/21 1338   BP: 130/78   Pulse: 58   Resp: 18   SpO2: 98%    : Wt Readings from Last 3 Encounters:   03/30/21 182 lb 1.6 oz (82.6 kg)   03/17/21 180 lb (81.6 kg)   02/17/21 182 lb 11.2 oz (82.9 kg)                Current Outpatient Medications:     BUSPIRONE HCL PO, , Disp: , Rfl:     MOMETASONE FUROATE IN, , Disp: , Rfl:     anastrozole (ARIMIDEX) 1 MG tablet, Take 1 tablet by mouth daily, Disp: 30 tablet, Rfl: 0    levothyroxine (SYNTHROID) 50 MCG tablet, Take 50 mcg by mouth Daily, Disp: , Rfl:       Patient is seen today in follow up, Patient is seen today in follow up, 6 week check, having completed radiation therapy to the left breast 4256 cGy in 126 fractions from 1/27/2021 through 2/17/2021 related to mixed invasive carcinoma, both lobular and ductal features  noted in pathology returning from lumpectomy completed on 12/4/2020, pT1 pN0 ER/CO+HER2-. Oncotype returned with a score of 0. Patient does follow with Dr Deniz Faulkner, Medical Oncology, with initiation of adjuvant endocrine therapy beginning on 2/18/2021, specific agent being Arimidex. Patient will return to for follow up with Dr Deniz Faulkner on 4/14/2021. FALLS RISK SCREENING ASSESSMENT    Instructions:  Assess the patient and enter the appropriate indicators that are present for fall risk identification. Total the numbers entered and assign a fall risk score from Table 2.  Reassess patient at a minimum every 12 weeks or with status change. Assessment   Date  3/30/2021     1. Mental Ability: confusion/cognitively impaired No - 0       2. Elimination Issues: incontinence, frequency No - 0       3. Ambulatory: use of assistive devices (walker, cane, off-loading devices), attached to equipment (IV pole, oxygen) No - 0     4. Sensory Limitations: dizziness, vertigo, impaired vision No - 0       5. Age 72 years or greater - 1       10.   Medication: diuretics, strong analgesics, hypnotics, sedatives, antihypertensive agents   No - 0   7. Falls:  recent history of falls within the last 3 months (not to include slipping or tripping)   No - 0   TOTAL 1    If score of 4 or greater was education given? No       TABLE 2   Risk Score Risk Level Plan of Care   0-3 Little or  No Risk 1. Provide assistance as indicated for ambulation activities  2. Reorient confused/cognitively impaired patient  3. Call-light/bell within patient's reach  4. Chair/bed in low position, stretcher/bed with siderails up except when performing patient care activities  5. Educate patient/family/caregiver on falls prevention  6.  Reassess in 12 weeks or with any noted change in patient condition which places them at a risk for a fall   4-6 Moderate Risk 1. Provide assistance as indicated for ambulation activities  2. Reorient confused/cognitively impaired patient  3. Call-light/bell within patient's reach  4. Chair/bed in low position, stretcher/bed with siderails up except when performing patient care activities  5. Educate patient/family/caregiver on falls prevention  6. Falls risk precaution (Yellow sticker Level II) placed on patient chart   7 or   Higher High Risk 1. Place patient in easily observable treatment room  2. Patient attended at all times by family member or staff  3. Provide assistance as indicated for ambulation activities  4. Reorient confused/cognitively impaired patient  5. Call-light/bell within patient's reach  6. Chair/bed in low position, stretcher/bed with siderails up except when performing patient care activities  7. Educate patient/family/caregiver on falls prevention  8. Falls risk precaution (Yellow sticker Level III) placed on patient chart           MALNUTRITION RISK SCREENING ASSESSMENT    3/30/2021   Patient:  Ophelia Rouse  Sex:  female    Instructions:  Assess the patient and enter the appropriate indicators that are present for nutrition risk identification.  Total the numbers entered and assign a risk score. Follow the appropriate action for total score listed below. Assessment   Date  3/30/2021     1. Have you lost weight without trying? 0- No     2. Have you been eating poorly because of a decreased appetite? 0- No   3. Do you have a diagnosis of head and neck cancer?       0- No                                                                                    TOTAL 0          Score of 0-1: No action  Score 2 or greater:  · For Non-Diabetic Patient: Recommend adding Ensure Complete 2 x daily and provide patient with Ensure wellness bag with coupons  · For Diabetic Patient: Recommend adding Glucerna Shake 2 x daily and provide patient with Glucerna Wellness bag with coupons  · Route to the dietitian via Lat[address]

## 2021-04-14 ENCOUNTER — OFFICE VISIT (OUTPATIENT)
Dept: ONCOLOGY | Age: 66
End: 2021-04-14
Payer: MEDICARE

## 2021-04-14 ENCOUNTER — HOSPITAL ENCOUNTER (OUTPATIENT)
Dept: INFUSION THERAPY | Age: 66
Discharge: HOME | End: 2021-04-14
Payer: MEDICARE

## 2021-04-14 VITALS
OXYGEN SATURATION: 99 % | HEIGHT: 65 IN | TEMPERATURE: 97.4 F | WEIGHT: 181 LBS | DIASTOLIC BLOOD PRESSURE: 73 MMHG | SYSTOLIC BLOOD PRESSURE: 169 MMHG | BODY MASS INDEX: 30.16 KG/M2 | HEART RATE: 57 BPM

## 2021-04-14 DIAGNOSIS — Z85.3 PERSONAL HISTORY OF BREAST CANCER: ICD-10-CM

## 2021-04-14 DIAGNOSIS — Z85.3 PERSONAL HISTORY OF BREAST CANCER: Primary | ICD-10-CM

## 2021-04-14 LAB
ALBUMIN SERPL-MCNC: 4.4 G/DL (ref 3.5–5.2)
ALP BLD-CCNC: 73 U/L (ref 35–104)
ALT SERPL-CCNC: 19 U/L (ref 0–32)
ANION GAP SERPL CALCULATED.3IONS-SCNC: 11 MMOL/L (ref 7–16)
AST SERPL-CCNC: 19 U/L (ref 0–31)
BASOPHILS ABSOLUTE: 0.02 E9/L (ref 0–0.2)
BASOPHILS RELATIVE PERCENT: 0.4 % (ref 0–2)
BILIRUB SERPL-MCNC: 0.3 MG/DL (ref 0–1.2)
BUN BLDV-MCNC: 15 MG/DL (ref 8–23)
CALCIUM SERPL-MCNC: 10 MG/DL (ref 8.6–10.2)
CHLORIDE BLD-SCNC: 101 MMOL/L (ref 98–107)
CO2: 27 MMOL/L (ref 22–29)
CREAT SERPL-MCNC: 1.3 MG/DL (ref 0.5–1)
EOSINOPHILS ABSOLUTE: 0.04 E9/L (ref 0.05–0.5)
EOSINOPHILS RELATIVE PERCENT: 0.8 % (ref 0–6)
GFR AFRICAN AMERICAN: 49
GFR NON-AFRICAN AMERICAN: 41 ML/MIN/1.73
GLUCOSE BLD-MCNC: 74 MG/DL (ref 74–99)
HCT VFR BLD CALC: 41.7 % (ref 34–48)
HEMOGLOBIN: 13.7 G/DL (ref 11.5–15.5)
IMMATURE GRANULOCYTES #: 0.02 E9/L
IMMATURE GRANULOCYTES %: 0.4 % (ref 0–5)
LYMPHOCYTES ABSOLUTE: 0.9 E9/L (ref 1.5–4)
LYMPHOCYTES RELATIVE PERCENT: 18.8 % (ref 20–42)
MCH RBC QN AUTO: 30.7 PG (ref 26–35)
MCHC RBC AUTO-ENTMCNC: 32.9 % (ref 32–34.5)
MCV RBC AUTO: 93.5 FL (ref 80–99.9)
MONOCYTES ABSOLUTE: 0.38 E9/L (ref 0.1–0.95)
MONOCYTES RELATIVE PERCENT: 7.9 % (ref 2–12)
NEUTROPHILS ABSOLUTE: 3.42 E9/L (ref 1.8–7.3)
NEUTROPHILS RELATIVE PERCENT: 71.7 % (ref 43–80)
PDW BLD-RTO: 12.7 FL (ref 11.5–15)
PLATELET # BLD: 243 E9/L (ref 130–450)
PMV BLD AUTO: 9.7 FL (ref 7–12)
POTASSIUM SERPL-SCNC: 4 MMOL/L (ref 3.5–5)
RBC # BLD: 4.46 E12/L (ref 3.5–5.5)
SODIUM BLD-SCNC: 139 MMOL/L (ref 132–146)
TOTAL PROTEIN: 7.3 G/DL (ref 6.4–8.3)
WBC # BLD: 4.8 E9/L (ref 4.5–11.5)

## 2021-04-14 PROCEDURE — 36415 COLL VENOUS BLD VENIPUNCTURE: CPT

## 2021-04-14 PROCEDURE — 99212 OFFICE O/P EST SF 10 MIN: CPT

## 2021-04-14 PROCEDURE — 99214 OFFICE O/P EST MOD 30 MIN: CPT | Performed by: INTERNAL MEDICINE

## 2021-04-14 PROCEDURE — 85025 COMPLETE CBC W/AUTO DIFF WBC: CPT

## 2021-04-14 PROCEDURE — 80053 COMPREHEN METABOLIC PANEL: CPT

## 2021-04-14 RX ORDER — ANASTROZOLE 1 MG/1
1 TABLET ORAL DAILY
Qty: 90 TABLET | Refills: 1 | Status: SHIPPED
Start: 2021-04-14 | End: 2021-10-12

## 2021-04-14 NOTE — PROGRESS NOTES
Department of [facility] Oncology   Attending Clinic Note    Reason for Visit: Follow-up on a patient with Left Breast Cancer    PCP:  Celina Lares DO    History of Present Illness:  77year old female found to have lesion in upper inner quadrant of left breast    OBSTETRIC RELATED HISTORY:   Age of menarche was 13. Age of menopause was 53. Patient denies hormonal therapy. Patient is . Age of first live birth was 30. Patient did not breast feed. Is patient interested in fertility information about fertility preservation? No     Left breast US 2020:  1.4 cm oval nodule with indistinct and microlobulated margin left breast upper inner aspect middle depth. Suspicious of malignancy follow-up recommended    US guided left breast core biopsy 2020:  Scattered fibroglandular elements in both breasts  New 8 mm irregular density with indistinct margin in left breast at 12 oclock   -Atypical papillary lesion favor intracystic papillary carcinoma  -Atypical lobular hyperplasia with focal minute microcalcification  -Immunostaining for ER is +95% and VA is +95%  -CK 5/6 is negative and epithelial cells. E-cadherin is positive in the papillary lesion and negative in the atypical lobular hyperplasia. Left breast Needle Localized Lumpectomy on 2020: Invasive lobular and invasive ductal carcinoma with foci of lobular carcinoma in situ, ductal carcinoma in situ, low-grade, cribriform type and encapsulated papillary carcinoma. Margins of excision free of carcinoma    Comment:   The lumpectomy specimen contains a mixed invasive lobular (E-cadherin negative, 1.2 x 0.6 x 0.5 cm) and ductal carcinoma (E-cadherin positive, 1.5 x 1.3 x 1.1 cm).     The invasive lobular component has a Waldo score of 3+1+1 = 5 with adjacent areas of   lobular carcinoma in situ.  The invasive lobular and LCIS come to within 0.5 mm of the inked lateral margin of excision.    The invasive ductal carcinoma has a Bristol score of 2+1+1 = 4 and has adjacent areas of   ductal carcinoma in situ, cribriform type, low nuclear grade as well as a 0.5 cm area of encapsulated papillary carcinoma (p63 negative for myoepithelial cells).    The remaining breast tissue shows focal intraductal papillomata as well as focal microcalcifications. Breast Cancer Marker Studies:   Estrogen Receptors (ER): 90%   Progesterone Receptors (DE): 90%   Her-2/lupillo (c-erb B-2) protein expression: Negative (0)      Clinical Stage I left breast cancer. Left axillary sentinel lymph node biopsy on 12/18/2020:  A.  [place] lymph node #1, biopsy: Four (4) reactive nodes, negative for malignancy. B.  [place] lymph node #2, biopsy: One (1) reactive node, negative for malignancy. C.  [place] lymph node #3, biopsy: Two (2) reactive nodes, negative for malignancy. pT1N0 M0 ER+DE+ HER2 -  Oncotype recurrence score: 0  Distant recurrence risk at 9 years with AI or PEREZ alone- 3%   Absolute chemotherapy benefit RS 0-10 All Ages: <1%  Recommended adjuvant RT followed by adjuvant endocrine therapy (Arimidex 1 mg po daily for 5 years). RT was started on 01/27/2021 and completed 02/17/2021. DEXA scan done at her PCP's office in [facility] 08/30/2020 Normal bone mineral density. Imaging reviewed. Arimidex 1 mg po daily was started on 02/18/2021. Night sweats noted. She declined starting Effexor 37.7 mg po daily. + constipation noted as well. Review of Systems;  CONSTITUTIONAL: Night sweats noted. She declined Effexor  ENMT: Eyes: No diplopia; Nose: No epistaxis. Mouth: No sore throat. RESPIRATORY: No hemoptysis, shortness of breath, cough. CARDIOVASCULAR: No chest pain, palpitations. GASTROINTESTINAL: No nausea/vomiting, abdominal pain. + constipation  GENITOURINARY: No dysuria, urinary frequency, hematuria. NEURO: No syncope, presyncope, headache.   Remainder:  ROS NEGATIVE    Past Medical History:      Diagnosis Date    Allergic rhinitis     seasonal    Cancer ([address]     Hypothyroidism      Medications:  Reviewed and reconciled. Allergies:  No Known Allergies    Physical Exam:  BP (!) 169/73 (Site: Right Upper Arm, Position: Sitting, Cuff Size: Medium Adult)   Pulse 57   Temp 97.4 °F (36.3 °C) (Temporal)   Ht 5' 5\" (1.651 m)   Wt 181 lb (82.1 kg)   SpO2 99%   BMI 30.12 kg/m²   GENERAL: Alert, oriented x 3,tired  HEENT: PERRLA; EOMI. Oropharynx clear. NECK: Supple. Without lymphadenopathy. LUNGS: Good air entry bilaterally. No wheezing, crackles or ronchi. CARDIOVASCULAR: Regular rate. No murmurs, rubs or gallops. ABDOMEN: Soft. Non-tender, non-distended. Positive bowel sounds. EXTREMITIES: Without clubbing, cyanosis, or edema. NEUROLOGIC: No focal deficits. ECOG PS 1    Impression/Plan:  76 y/o female who underwent Left breast Needle Localized Lumpectomy on 12/4/2020: Invasive lobular and invasive ductal carcinoma with foci of lobular carcinoma in situ, ductal carcinoma in situ, low-grade, cribriform type and encapsulated papillary carcinoma. Margins of excision free of carcinoma    Comment:   The lumpectomy specimen contains a mixed invasive lobular (E-cadherin negative, 1.2 x 0.6 x 0.5 cm) and ductal carcinoma (E-cadherin positive, 1.5 x 1.3 x 1.1 cm).    The invasive lobular component has a Conklin score of 3+1+1 = 5 with adjacent areas of   lobular carcinoma in situ.  The invasive lobular and LCIS come to within 0.5 mm of the inked lateral margin of excision.    The invasive ductal carcinoma has a Waldo score of 2+1+1 = 4 and has adjacent areas of   ductal carcinoma in situ, cribriform type, low nuclear grade as well as a 0.5 cm area of encapsulated papillary carcinoma (p63 negative for myoepithelial cells).    The remaining breast tissue shows focal intraductal papillomata as well as focal microcalcifications.      Breast Cancer Marker Studies:   Estrogen Receptors (ER): 90%   Progesterone Receptors

## 2021-07-02 ENCOUNTER — TELEPHONE (OUTPATIENT)
Dept: BREAST CENTER | Age: 66
End: 2021-07-02

## 2021-07-02 DIAGNOSIS — N63.0 BREAST SWELLING: Primary | ICD-10-CM

## 2021-07-02 DIAGNOSIS — N62 HYPERTROPHY OF BREAST: ICD-10-CM

## 2021-07-02 DIAGNOSIS — N63.0 BREAST MASS: ICD-10-CM

## 2021-07-02 NOTE — TELEPHONE ENCOUNTER
Spoke with patient who states about a week ago, left breast (surgical breast) became red, warm and slightly hard. Denies fever or nipple discharge. Patient received RT which she finished in March. Continues on Arimidex. Patient states she has been putting aquaphor to site and it seems a little less red but still warm to touch.   Appointment given with imaging per patient request

## 2021-07-02 NOTE — PROGRESS NOTES
Subjective: This note was copied forward from the last encounter. Essential components for this patient record were reviewed and verified on this visit including:  recent hospitalizations, recent imaging, PMH, PSH, FH, SOC HX, Allergies, and Medications were reviewed and updated as appropriate. In addition, the assessment and plan were copied from prior office note and updated accordingly. Patient ID: Doe Morales is a 77 y.o. female. HPI      Patient's Name/Date of Birth: Doe Morales / 1955    Left breast US 9/18/2020:  1.4 cm oval nodule with indistinct and microlobulated margin left breast upper inner aspect middle depth. Suspicious of malignancy follow-up recommended     US guided left breast core biopsy 9/29/2020:  Scattered fibroglandular elements in both breasts  New 8 mm irregular density with indistinct margin in left breast at 12 oclock   -Atypical papillary lesion favor intracystic papillary carcinoma  -Atypical lobular hyperplasia with focal minute microcalcification  -Immunostaining for ER is +95% and [place] is +95%  -CK 5/6 is negative and epithelial cells. E-cadherin is positive in the papillary lesion and negative in the atypical lobular hyperplasia.      Left breast Needle Localized Lumpectomy on 12/4/2020: Invasive lobular and invasive ductal carcinoma with foci of lobular carcinoma in situ, ductal carcinoma in situ, low-grade, cribriform type and encapsulated papillary carcinoma. Margins of excision free of carcinoma     Comment:   The lumpectomy specimen contains a mixed invasive lobular (E-cadherin negative, 1.2 x 0.6 x 0.5 cm) and ductal carcinoma (E-cadherin positive, 1.5 x 1.3 x 1.1 cm).     The invasive lobular component has a Dolphin score of 3+1+1 = 5 with adjacent areas of   lobular carcinoma in situ.  The invasive lobular and LCIS come to within 0.5 mm of the inked lateral margin of excision.    The invasive ductal carcinoma has a Dolphin score of 2+1+1 = 4 and has adjacent areas of   ductal carcinoma in situ, cribriform type, low nuclear grade as well as a 0.5 cm area of encapsulated papillary carcinoma (p63 negative for myoepithelial cells).    The remaining breast tissue shows focal intraductal papillomata as well as focal microcalcifications. Breast Cancer Marker Studies:   Estrogen Receptors (ER): 90%   Progesterone Receptors (LA): 90%   Her-2/lupillo (c-erb B-2) protein expression: Negative (0)      Clinical Stage I left breast cancer. Left axillary sentinel lymph node biopsy on 2020:  A.  [place] lymph node #1, biopsy: Four (4) reactive nodes, negative for malignancy. B.  [place] lymph node #2, biopsy: One (1) reactive node, negative for malignancy. C.  [place] lymph node #3, biopsy: Two (2) reactive nodes, negative for malignancy.      pT1N0 M0 ER+LA+ HER2 -  Oncotype recurrence score: 0  Distant recurrence risk at 9 years with AI or PEREZ alone- 3%   Absolute chemotherapy benefit RS 0-10 All Ages: <1%  Recommended adjuvant RT followed by adjuvant endocrine therapy (Arimidex 1 mg po daily for 5 years). RT was started on 2021 and completed 2021. DEXA scan done at her PCP's [facility] [place] [facility] [place] [facility] Normal bone mineral density. Imaging reviewed.     Arimidex 1 mg po daily was started on 2021. Night sweats noted. She declined starting Effexor 37.7 mg po daily. + constipation noted as well. Patient is here for  Complaints of red, warm, inflamed left breast. It has been present for about a week. She denies fever, chills or nipple discharge. She finished her RT in March      OBSTETRIC RELATED HISTORY:  Age of menarche was 13. Age of menopause was 53. Patient denies hormonal therapy. Patient is . Age of first live birth was 30. Patient did not breast feed. Is patient interested in fertility information about fertility preservation?  No    CANCER SURVEILLANCE HISTORY:  Mammograms: Yes  Breast MRI's: No Breast Biopsies: Yes   Colonoscopy: Yes 2016  GI Polyps: Yes   EGD: Yes   Pelvic Exam: Yes   Pap Smear: Yes   Dermatology: No   Lung screening: no      Have you received your flu vaccination this year? Yes  Have you received your Pneumococcal vaccination? Yes      Estimated body mass index is 30.12 kg/m² as calculated from the following:    Height as of 4/14/21: 5' 5\" (1.651 m). Weight as of 4/14/21: 181 lb (82.1 kg). Bra Size: 42d    Because violence is so common, we ask all our patients: are you in a relationship or do you live with a person who threatens, hurts, or controls you:  denies    Patient drinks moderate caffeinated beverages. Patient does not smoke cigarettes. Patient does not use recreational drugs. Patient admits to drinking 4 cans beer a day      Past Medical History:   Diagnosis Date    Allergic rhinitis     seasonal    Cancer ([facility] [address]     Hypothyroidism        Past Surgical History:   Procedure Laterality Date    BREAST BIOPSY Left 12/18/2020    LEFT AXILLARY SENTINEL LYMPH NODE BIOPSY--NEOPROBE performed by Sagar Horner MD at . [facility] LUMPECTOMY Left 12/4/2020    LEFT BREAST NEEDLE LOCALIZED LUMPECTOMY --TRIDENT performed by Sagar Horner MD at [address] [place] [address]  2016    TUBAL LIGATION         Current Outpatient Medications   Medication Sig Dispense Refill    anastrozole (ARIMIDEX) 1 MG tablet Take 1 tablet by mouth daily 90 tablet 1    BUSPIRONE HCL PO       MOMETASONE FUROATE IN       levothyroxine (SYNTHROID) 50 MCG tablet Take 50 mcg by mouth Daily       No current facility-administered medications for this visit.        No Known Allergies    Family History   Problem Relation Age of Onset    Cancer Father 79        prostate cancer    Cancer Brother 54        prostate    Cancer Brother 54        prostate cancer       Social History     Socioeconomic History    Marital status:      Spouse name: Not on file    Number of children: 2    Years of education: Not on file    Highest education level: Not on file   Occupational History    Occupation: [address] [employer]   Tobacco Use    Smoking status: Former Smoker     Quit date: 2000     Years since quittin.5    Smokeless tobacco: Never Used   Substance and Sexual Activity    Alcohol use: Yes     Alcohol/week: 4.0 standard drinks     Types: 4 Cans of beer per week     Comment: has drank for years    Drug use: Never    Sexual activity: Not on file   Other Topics Concern    Not on file   Social History Narrative    Not on file     Social Determinants of Health     Financial Resource Strain:     Difficulty of Paying Living Expenses:    Food Insecurity:     Worried About Running Out of Food in the Last Year:     [address] in the Last Year:    Transportation Needs:     Lack of Transportation (Medical):  Lack of Transportation (Non-Medical):    Physical Activity:     Days of Exercise per Week:     Minutes of Exercise per Session:    Stress:     Feeling of Stress :    Social Connections:     Frequency of Communication with Friends and Family:     Frequency of Social Gatherings with Friends and Family:     Attends Synagogue Services:     Active Member of Clubs or Organizations:     Attends Club or Organization Meetings:     Marital Status:    Intimate Partner Violence:     Fear of Current or Ex-Partner:     Emotionally Abused:     Physically Abused:     Sexually Abused:        Occupation: retired. Worked customer service prior. Review of Systems   Constitutional: Positive for fatigue. Negative for activity change, appetite change, chills, fever and unexpected weight change. Generally feels well. C/o discomfort in the left breast.  Denies fever, chills. States it feels \"heavy\". HENT: Negative for congestion, postnasal drip, rhinorrhea, sinus pressure, sinus pain, sore throat, trouble swallowing and voice change. Eyes: Negative for discharge, itching and visual disturbance. Respiratory: Negative for cough, choking, chest tightness, shortness of breath and wheezing. Cardiovascular: Negative for chest pain, palpitations and leg swelling. Gastrointestinal: Negative for abdominal distention, abdominal pain, blood in stool, constipation, diarrhea, nausea and vomiting. Endocrine: Negative for cold intolerance and heat intolerance. Genitourinary: Negative for difficulty urinating, dysuria, frequency and hematuria. Musculoskeletal: Negative for arthralgias, back pain, gait problem, joint swelling, myalgias, neck pain and neck stiffness. Allergic/Immunologic: Negative for environmental allergies and food allergies. Neurological: Negative for dizziness, seizures, syncope, speech difficulty, weakness, light-headedness and headaches. Hematological: Negative for adenopathy. Does not bruise/bleed easily. Psychiatric/Behavioral: Negative for agitation, confusion and decreased concentration. The patient is not nervous/anxious. Patient denies previous history of DVT/PE. Objective:   Physical Exam  Vitals and nursing note reviewed. Exam conducted with a chaperone present. Constitutional:       General: She is not in acute distress. Appearance: She is well-developed. She is not diaphoretic. Comments: ECOG 0; pleasant and conversant. HENT:      Head: Normocephalic and atraumatic. Mouth/Throat:      Pharynx: No oropharyngeal exudate. Eyes:      General: No scleral icterus. Right eye: No discharge. Left eye: No discharge. Conjunctiva/sclera: Conjunctivae normal.      Pupils: Pupils are equal, round, and reactive to light. Neck:      Thyroid: No thyromegaly. Vascular: No JVD. Trachea: No tracheal deviation. Cardiovascular:      Rate and Rhythm: Regular rhythm. Bradycardia present. Heart sounds: Normal heart sounds. No murmur heard. No friction rub. No gallop. Comments: Apical 48; regular.     Pulmonary: Effort: Pulmonary effort is normal. No respiratory distress or retractions. Breath sounds: Normal breath sounds. No stridor. No wheezing or rales. Chest:      Chest wall: No mass, lacerations, deformity, swelling, tenderness or edema. Breasts: Breasts are symmetrical.         Right: No inverted nipple, mass, nipple discharge, skin change or tenderness. Left: No inverted nipple, mass, nipple discharge, skin change or tenderness. Comments: Right breast exam is unremarkable. Abdominal:      General: There is no distension. Palpations: Abdomen is soft. Tenderness: There is no abdominal tenderness. There is no guarding or rebound. Musculoskeletal:         General: No tenderness or deformity. Normal range of motion. Right shoulder: Normal. Normal range of motion. Left shoulder: Normal. Normal range of motion. Cervical back: Normal range of motion and neck supple. Lymphadenopathy:      Cervical: No cervical adenopathy. Right cervical: No superficial, deep or posterior cervical adenopathy. Left cervical: No superficial, deep or posterior cervical adenopathy. Upper Body:      Right upper body: No supraclavicular or pectoral adenopathy. Left upper body: No supraclavicular or pectoral adenopathy. Skin:     General: Skin is warm and dry. Coloration: Skin is not pale. Findings: No erythema or rash. Neurological:      Mental Status: She is alert and oriented to person, place, and time. Coordination: Coordination normal.   Psychiatric:         Behavior: Behavior normal.         Thought Content: Thought content normal.         Judgment: Judgment normal.                Assessment:         77 y.o. pleasant woman who underwent    Left breast US 9/18/2020:  1.4 cm oval nodule with indistinct and microlobulated margin left breast upper inner aspect middle depth.   Suspicious of malignancy follow-up recommended     US guided left breast core biopsy 9/29/2020:  Scattered fibroglandular elements in both breasts  New 8 mm irregular density with indistinct margin in left breast at 12 oclock   -Atypical papillary lesion favor intracystic papillary carcinoma  -Atypical lobular hyperplasia with focal minute microcalcification  -Immunostaining for ER is +95% and OK is +95%  -CK 5/6 is negative and epithelial cells. E-cadherin is positive in the papillary lesion and negative in the atypical lobular hyperplasia.      Left breast Needle Localized Lumpectomy on 12/4/2020: Invasive lobular and invasive ductal carcinoma with foci of lobular carcinoma in situ, ductal carcinoma in situ, low-grade, cribriform type and encapsulated papillary carcinoma. Margins of excision free of carcinoma     Comment:   The lumpectomy specimen contains a mixed invasive lobular (E-cadherin negative, 1.2 x 0.6 x 0.5 cm) and ductal carcinoma (E-cadherin positive, 1.5 x 1.3 x 1.1 cm).    The invasive lobular component has a [place] score of 3+1+1 = 5 with adjacent areas of   lobular carcinoma in situ.  The invasive lobular and LCIS come to within 0.5 mm of the inked lateral margin of excision.    The invasive ductal carcinoma has a [place] score of 2+1+1 = 4 and has adjacent areas of   ductal carcinoma in situ, cribriform type, low nuclear grade as well as a 0.5 cm area of encapsulated papillary carcinoma (p63 negative for myoepithelial cells).    The remaining breast tissue shows focal intraductal papillomata as well as focal microcalcifications. Breast Cancer Marker Studies:   Estrogen Receptors (ER): 90%   Progesterone Receptors (OK): 90%   Her-2/lupillo (c-erb B-2) protein expression: Negative (0)      Clinical Stage I left breast cancer. Left axillary sentinel lymph node biopsy on 12/18/2020:  A.  [place] lymph node #1, biopsy: Four (4) reactive nodes, negative for malignancy. B.  [place] lymph node #2, biopsy: One (1) reactive node, negative for malignancy.    C.  [place] lymph node #3, biopsy: Two (2) reactive nodes, negative for malignancy.      pT1N0 M0 ER+SD+ HER2 -  Oncotype recurrence score: 0    -Completed adjuvant RT on 02/17/2021.   -02/18/2021 Arimidex 1 mg po daily was started.     -07/08/2021: LEFT BREAST DIAGNOSTIC MAMMOGRAM AND ULTRASOUND: [facility]        Impression   Benign findings with no sonographic evidence of malignancy in the left breast.       Recommendation:       Annual bilateral mammogram is advised with consideration for annual bilateral   screening ultrasound given the patient's breast density.       BIRADS:   BIRADS - CATEGORY 2       Benign Findings.       OVERALL ASSESSMENT - BENIGN         -07/08/2021 Clinical follow up is without evidence of recurrent disease. She has post-treatment changes of the left breast and grade II lymphedema of the left breast.  Will refer to OT. She has bradycardia with fatigue. We discussed in detail. She had a work-up in 2020 prior to her breast cancer diagnosis that included EKG, Holter Monitor and cardiology evaluation @ [facility].  She did not follow up. Offered cardiology referral; she prefers to contact the cardiologist team at [facility], which is certainly reasonable. She will call to make an appointment. Plan:      1. Continue monthly breast self examination; detailed instructions reviewed today. Bring any changes to your physician's attention. 2. Continue healthy diet and exercise routinely as tolerated. 3. Avoid alcohol. 4. Limit caffeine intake. 5. Repeat mammogram September 2021.  6. Continue follow up with Primary Care. 7. Follow up with Cardiology at [facility] re: bradycardia. 8. Refer to OT for lymphedema management of the left breast.  9. RTC September with mammogram same day. During today's visit, face-to-face time 25 minutes, greater than 50% in counseling education and coordination of care.  All questions were answered to her apparent satisfaction, and she is agreeable to the plan as outlined above. This document is generated, in part, by voice recognition software and thus syntax and grammatical errors are possible. Fito Elkins, RN, MSN, APRN-CNP, [address]  Advanced Oncology Certified Nurse Practitioner  Department of Breast Surgery  [facility]/  [facility] in collaboration with Dr. Luz Marina Good.  Vianey/Dr. Nery Bautista APRN-CNP      July 8, 2021

## 2021-07-08 ENCOUNTER — OFFICE VISIT (OUTPATIENT)
Dept: BREAST CENTER | Age: 66
End: 2021-07-08
Payer: MEDICARE

## 2021-07-08 ENCOUNTER — HOSPITAL ENCOUNTER (OUTPATIENT)
Dept: GENERAL RADIOLOGY | Age: 66
Discharge: HOME | End: 2021-07-10
Payer: MEDICARE

## 2021-07-08 VITALS
HEIGHT: 65 IN | OXYGEN SATURATION: 98 % | TEMPERATURE: 97.2 F | SYSTOLIC BLOOD PRESSURE: 118 MMHG | DIASTOLIC BLOOD PRESSURE: 72 MMHG | BODY MASS INDEX: 29.56 KG/M2 | RESPIRATION RATE: 18 BRPM | WEIGHT: 177.4 LBS | HEART RATE: 45 BPM

## 2021-07-08 DIAGNOSIS — N63.0 BREAST MASS: ICD-10-CM

## 2021-07-08 DIAGNOSIS — I89.0 LYMPHEDEMA OF BREAST: ICD-10-CM

## 2021-07-08 DIAGNOSIS — M17.0 PRIMARY OSTEOARTHRITIS OF BOTH KNEES: ICD-10-CM

## 2021-07-08 DIAGNOSIS — Z85.3 PERSONAL HISTORY OF BREAST CANCER: Primary | ICD-10-CM

## 2021-07-08 DIAGNOSIS — R00.1 BRADYCARDIA: ICD-10-CM

## 2021-07-08 DIAGNOSIS — N63.0 BREAST SWELLING: ICD-10-CM

## 2021-07-08 DIAGNOSIS — N62 HYPERTROPHY OF BREAST: ICD-10-CM

## 2021-07-08 PROCEDURE — 76642 ULTRASOUND BREAST LIMITED: CPT

## 2021-07-08 PROCEDURE — 99213 OFFICE O/P EST LOW 20 MIN: CPT | Performed by: NURSE PRACTITIONER

## 2021-07-08 ASSESSMENT — ENCOUNTER SYMPTOMS
TROUBLE SWALLOWING: 0
SORE THROAT: 0
COUGH: 0
SINUS PAIN: 0
CHOKING: 0
SHORTNESS OF BREATH: 0
RHINORRHEA: 0
ABDOMINAL PAIN: 0
CONSTIPATION: 0
NAUSEA: 0
SINUS PRESSURE: 0
VOMITING: 0
BLOOD IN STOOL: 0
CHEST TIGHTNESS: 0
EYE DISCHARGE: 0
WHEEZING: 0
BACK PAIN: 0
DIARRHEA: 0
ABDOMINAL DISTENTION: 0
EYE ITCHING: 0
VOICE CHANGE: 0

## 2021-07-08 NOTE — PATIENT INSTRUCTIONS

## 2021-07-13 DIAGNOSIS — I89.0 LYMPHEDEMA OF BREAST: Primary | ICD-10-CM

## 2021-07-16 ENCOUNTER — HOSPITAL ENCOUNTER (OUTPATIENT)
Dept: OCCUPATIONAL THERAPY | Age: 66
Setting detail: THERAPIES SERIES
Discharge: HOME | End: 2021-07-16
Payer: MEDICARE

## 2021-07-16 PROCEDURE — 97165 OT EVAL LOW COMPLEX 30 MIN: CPT | Performed by: OCCUPATIONAL THERAPIST

## 2021-07-16 NOTE — PROGRESS NOTES
Occupational Therapy      OCCUPATIONAL THERAPY INITIAL [address] [address] [facility] [place]  [address]   Phone: [phone]  Fax: [phone]     Date:  2021  Initial Evaluation Date: 2021    Evaluating Therapist: Don Galeazzi, OTR/L, Nancey Cam    Patient Name:  Maria Esther Clements    :  1955    Restrictions/Precautions:  fall risk  Diagnosis:  Lymphedema of the breast (I89.0)       Date of Surgery/Injury: n/a    Insurance/Certification information:  BSBS Medicare - [id number]  Plan of care signed (Y/N): N  Visit# / total visits: Evaluation    Referring Practitioner:  AMBROSE Naranjo  Specific Practitioner Orders: Evaluation and Treatment    Assessment of current deficits   []Pain  []Skin Integrity   [x]Lymphedema   []Functional transfers/mobility   []ADLs   []Strength    []Cognition  []IADLs   []Safety Awareness   []  Motor Endurance    []Fine Motor Coordination   []Balance   []Vision/perception  []Sensation []Gross Motor Coordination  []ROM     OT PLAN OF CARE   OT POC based on physician orders, patient diagnosis and results of clinical assessment    Frequency/Duration:  1-2x/wk for 12 treatment sessions from 2021 through 2021  Specific OT Treatment to include:     Plan of Care:     [x]97140-Manual Lymph Drainage and Combined Decongestive Therapy  [x]11552- Skilled Multilayer Short Stretch Compression Bandaging/ Therapeutic Exercise  [x]Skin Care Education [x]HEP including Self MLD Education and/or Self Bandaging  [x]Education for Lymphedema Risks/Precautions     [x] Caregiver Education   []other:      Patient Specific Goal: to get rid of the swelling      STG: 3 weeks  1. Patient will demonstrate knowledge and understanding for lymphedema precautions, skin care and self management to decrease progression of lymphedema and risk of infection.   2.  Patient will present with decreased limb volume in the involved extremity from moderate to mild for improved functional mobility. 3.  Patient will demonstrate compliance with compression therapy for independent management of lymphedema. LT weeks  1. Patient will be independent with self management of lymphedema including understanding garment wear schedule, self compression bandaging, HEP and self care. 2.  Patient will be fitted for appropriate compression garments for long term management of lymphedema. 3.   Patient will present with decreased limb volume in the involved extremity from mild to trace  for improved functional mobility. Past medical History:  Past Medical History:   Past Medical History:   Diagnosis Date    Allergic rhinitis     seasonal    [facility] cancer [facility] [address]     Left    Cancer ([facility] [address]     History of therapeutic radiation     Hypothyroidism      Past Surgical History:   Past Surgical History:   Procedure Laterality Date    BREAST BIOPSY Left 2020    LEFT AXILLARY SENTINEL LYMPH NODE BIOPSY--NEOPROBE performed by Gardenia Caceres MD at [facility] BREAST LUMPECTOMY Left 2020    LEFT BREAST NEEDLE LOCALIZED LUMPECTOMY --TRIDENT performed by Gardenia Caceres MD at [facility] COLONOSCOPY  2016    TUBAL LIGATION         [x]Surgery: lumpectomy   [x]Lymph node removal: 4  [x]Radiation Therapy: completed  []Chemotherapy: none  []History of Cellulitis/Infection: none  []Family history of lymphedema: none  []Previous treatment for lymphedema: none  []Current compression garment use: none    Reason for Referral: Pt was referred to [facility] due to intractable lymphedema of the left breast, unrelieved by elevation. Chief Complaint: increased swelling and discomfort  Triggers: none noted at time of evaluation    Home Living: patient lives with spouse in one floor home with two step entry with rail. Patient does not use an assisted device.  Patient does have a basement laundry and tub/shower combo on within functional limits for daily life tasks. Has glasses        Hearing: within functional limits for daily life tasks     ADL  Feeding:  independent  Grooming:  independent  Bathing:  independent  UE Dressing:  independent  LE Dressing:  independent  Toileting:  independent  Transfer:  independent    UE ASSESSMENT: Hand Dominance is right handed  ROM within functional limits for daily life tasks. Strength within functional limits for daily life tasks    Sensation: numbness / tingling left hand    OT G-codes:  Carrying, Handling, Moving objects   (Current status): CI   (Discharge Goal):  [facility]  Lymphedema Life Impact Scale Score:  11  Percent Impairment:  15%      Intervention: 60640, 66932, 18556     Eval Complexity: Low Complexity    Rehab Potential:                                 [x] Good  [] Fair  [] Poor        Suggested Professional Referral:       [x] No  [] Yes:  Barriers to Goal Achievement[de-identified]          [x] No  [] Yes:  Domestic Concerns:                           [x] No  [] Yes:       Patient. Education:  [x] Plans/Goals, Risks/Benefits discussed  [] Home exercise program  Method of Education: [x] Verbal  [] Demo  [] Written  Comprehension of Education:  [x] Verbalizes understanding. [] Demonstrates understanding. [] Needs Review. [] Demonstrates/verbalizes understanding of HEP/Ed previously given. Patient understands diagnosis/prognosis and consents to treatment, plan and goals: [x] Yes    [] No   This patient demonstrates the ability to follow the plan of care and progress towards goals. Rehab potential is good       Assessment: Secondary Lymphedema, Stage 2, Affecting the Left Breaset.   Patient will benefit from a Complete Decongestive Therapy protocol using manual lymphatic drainage techniques, skilled multilayer compression bandaging using short stretch bandages and foam padding, instruction in a home program of self massage and exercise, compression garment fitting and

## 2021-07-19 DIAGNOSIS — I89.0 LYMPHEDEMA OF BREAST: Primary | ICD-10-CM

## 2021-07-20 ENCOUNTER — HOSPITAL ENCOUNTER (OUTPATIENT)
Dept: OCCUPATIONAL THERAPY | Age: 66
Setting detail: THERAPIES SERIES
Discharge: HOME | End: 2021-07-20
Payer: MEDICARE

## 2021-07-20 NOTE — PROGRESS NOTES
Occupational Therapy      OCCUPATIONAL THERAPY PROGRESS NOTE  Phone: [phone]  Fax: [phone]     Date: 2021  Patient Name: Debarah Runner        : 1955       [] Pt Refusal           [] Pt Unavailable due to:  Patient called and cancelled scheduled appointment. Patient stated too far and trying to find someplace closer to her home.           Arden Garza OT, OTR/L, CLT-BLANCA Date: 2021

## 2021-07-29 ENCOUNTER — HOSPITAL ENCOUNTER (OUTPATIENT)
Dept: OCCUPATIONAL THERAPY | Age: 66
Setting detail: THERAPIES SERIES
Discharge: HOME | End: 2021-07-29
Payer: MEDICARE

## 2021-07-29 PROCEDURE — 97140 MANUAL THERAPY 1/> REGIONS: CPT | Performed by: OCCUPATIONAL THERAPIST

## 2021-07-29 PROCEDURE — 97530 THERAPEUTIC ACTIVITIES: CPT | Performed by: OCCUPATIONAL THERAPIST

## 2021-07-30 NOTE — PROGRESS NOTES
Occupational Therapy        OCCUPATIONAL THERAPY PROGRESS NOTE  [address] [facility],4Th [address]  [address] [facility] [place]  [address]              Phone: [phone]             Fax: [phone]      Date:  2021  Initial Evaluation Date: 2021    Evaluating Therapist: Cecy Bailey OT    Patient Name:  Tyler Henry    :  1955    Restrictions/Precautions:  fall risk  Diagnosis:  Lymphedema of the breast (I89.0)       Date of Surgery/Injury: n/a    Insurance/Certification information:  BSBS Medicare - [id number]  Plan of care signed (Y/N): Yes  Visit# / total visits: Visit #3    Referring Practitioner:  AMBROSE Myers  Specific Practitioner Orders: Evaluation and Treatment    Assessment of current deficits   []Pain  []Skin Integrity   [x]Lymphedema   []Functional transfers/mobility   []ADLs   []Strength    []Cognition  []IADLs   []Safety Awareness   []  Motor Endurance    []Fine Motor Coordination   []Balance   []Vision/perception  []Sensation []Gross Motor Coordination  []ROM     OT PLAN OF CARE   OT POC based on physician orders, patient diagnosis and results of clinical assessment    Frequency/Duration:  1-2x/wk for 12 treatment sessions from ly  through 2021  Specific OT Treatment to include:     Plan of Care:     [x]97140-Manual Lymph Drainage and Combined Decongestive Therapy  [x]49306- Skilled Multilayer Short Stretch Compression Bandaging/ Therapeutic Exercise  [x]Skin Care Education [x]HEP including Self MLD Education and/or Self Bandaging  [x]Education for Lymphedema Risks/Precautions     [x] Caregiver Education   []other:      Patient Specific Goal: to get rid of the swelling    STG: 3 weeks  1. Patient will demonstrate knowledge and understanding for lymphedema precautions, skin care and self management to decrease progression of lymphedema and risk of infection.   Therapist continued patient education regarding lymphedema precautions and skin care / self management. Patient able to verbalize understanding. Patient progressing toward goal.  2.  Patient will present with decreased limb volume in the involved extremity from moderate to mild for improved functional mobility. Therapist continued patient education regarding manual lymphatic drainage massage for breast involvement. Patient spouse attended education for further observation and hands on. Therapist performed manual lymphatic drainage massage sequence for breast involvement. Therapist described each step as it was performed. Patient and spouse able to verbalize understanding. patient verbalized improvement after sequence completed. Patient spouse provided educate regarding technique and body mechanics when performing at home. Patient spouse able to verbalize understanding. Patient spouse was provided hands on instruction to include verbal cues and hand over hand assistance. Patient spouse able to verbalize and demonstrate understanding. Patient progressing toward goal.   3.  Patient will demonstrate compliance with compression therapy for independent management of lymphedema.        LT weeks  1. Patient will be independent with self management of lymphedema including understanding garment wear schedule, self compression bandaging, HEP and self care. 2.  Patient will be fitted for appropriate compression garments for long term management of lymphedema. Therapist continued discussion regarding compression garment. Patient progressing toward goal.  3.   Patient will present with decreased limb volume in the involved extremity from mild to trace  for improved functional mobility.         Restrictions/Precautions:  [] No blood pressure/blood draw in: [] Left Upper Extremity     [] Right Upper Extremity        [x] Fall Risk       Intervention:   []Other    Pain:  [] No    [x] Yes  Location:  Left breast / flank  Pain Rating (0-10 pain scale): patient does not rate  Pain Description:  discomfort  Interruption of Treatment [] Yes  [x] No    Came to Clinic:  [] Bandaged    []Unbandaged    [] With Stocking     [] With Sleeve     [] Kinesiotaped    [] Alton Couch    [] With Alternative Compression:    Skin Integrity:  [] Normal [] Dry  []Rough []Moist []Rash  Location of problem area/s:  [] Wound: Location/Description   [] Fibrosis: Location/Description  [] Keratosis: Location/Description  [] Papillomas: Location/Description  [] Other    Color:  [] Normal [] Mottled [] Flushed [] Other/Description  Location of problem area/s:    Edema:  Edema noted left breast / flank    Subjective:  Patient attended treatment session with spouse. Patient with no new issues.     Objective:  [] Measurement [x]Manual Lymph Drainage  []Bandaging   []Kinesiotaping [x] Education    []Self Massage   []Self Bandaging [] Exercise    []Wound Care  []Hygiene  [] Other        Assessment:  Knowledge of home program:   [] Good [] Fair  [] Poor  Patient is programming at home:             [] Yes  [] No  Family is assisting with programming: [] Yes  [] No  Home programming is consistent:             [] Yes  [] No  Other:   Response to treatment:  good  Instructions for patient:   [x] Verbal [x] Written  Instructions addressed:  Self manual lymphatic drainage massage        Time In: 0900            Time Out: 1000                      Timed Code Treatment Minutes: 60 minutes      CODE  Minutes  Units   44966 OT Eval Low     87601 OT Eval Medium     87714 OT Eval High     83895 Fluidotherapy     15841 Manual 45 3   74501 Therapeutic Ex     75862 Therapeutic Activity 15 1   12976 ADL/COMP Tech Train     [id number] Neuromuscular Re-Ed     73441 OrthoManagementTraining     08086 Paraffin     89016 Electrical Stim - Attended     84607 Iontophoresis     89369 Ultrasound      Other     Total  60 4       Plan: Continue to address:  [x]Bandaging  [x] Self Bandaging/Family Assist  [x]MLD  [x]Self Massage  [x]Exercise [x]Education  []Obtain referral for garments  []Medical Hold  []Discharge to [facility] OTR/L, CLT

## 2021-08-05 ENCOUNTER — HOSPITAL ENCOUNTER (OUTPATIENT)
Dept: OCCUPATIONAL THERAPY | Age: 66
Setting detail: THERAPIES SERIES
Discharge: HOME | End: 2021-08-05
Payer: MEDICARE

## 2021-08-05 PROCEDURE — 97530 THERAPEUTIC ACTIVITIES: CPT | Performed by: OCCUPATIONAL THERAPIST

## 2021-08-05 PROCEDURE — 97140 MANUAL THERAPY 1/> REGIONS: CPT | Performed by: OCCUPATIONAL THERAPIST

## 2021-08-05 NOTE — PROGRESS NOTES
Occupational Therapy        OCCUPATIONAL THERAPY PROGRESS NOTE  111 [facility],4Th [address]  [address] [facility]  [address]              Phone: [phone]             Fax: [phone]      Date:  2021  Initial Evaluation Date: 2021    Evaluating Therapist: Derek Curiel OT    Patient Name:  Magi Hernandez    :  1955    Restrictions/Precautions:  fall risk  Diagnosis:  Lymphedema of the breast (I89.0)       Date of Surgery/Injury: n/a    Insurance/Certification information:  BSBS Medicare - [id number]  Plan of care signed (Y/N): Yes  Visit# / total visits: Visit #4    Referring Practitioner:  AMBROSE Benito  Specific Practitioner Orders: Evaluation and Treatment    Assessment of current deficits   []Pain  []Skin Integrity   [x]Lymphedema   []Functional transfers/mobility   []ADLs   []Strength    []Cognition  []IADLs   []Safety Awareness   []  Motor Endurance    []Fine Motor Coordination   []Balance   []Vision/perception  []Sensation []Gross Motor Coordination  []ROM     OT PLAN OF CARE   OT POC based on physician orders, patient diagnosis and results of clinical assessment    Frequency/Duration:  1-2x/wk for 12 treatment sessions from 2021 through 2021  Specific OT Treatment to include:     Plan of Care:     [x]97140-Manual Lymph Drainage and Combined Decongestive Therapy  [x]31250- Skilled Multilayer Short Stretch Compression Bandaging/ Therapeutic Exercise  [x]Skin Care Education [x]HEP including Self MLD Education and/or Self Bandaging  [x]Education for Lymphedema Risks/Precautions     [x] Caregiver Education   []other:      Patient Specific Goal: to get rid of the swelling    STG: 3 weeks  1. Patient will demonstrate knowledge and understanding for lymphedema precautions, skin care and self management to decrease progression of lymphedema and risk of infection.   Therapist continued patient education regarding lymphedema precautions [] Yes  [] No  Other:   Response to treatment:  good  Instructions for patient:   [x] Verbal [x] Written  Instructions addressed:  Self manual lymphatic drainage massage        Time In: 0900            Time Out: 0945                      Timed Code Treatment Minutes: 45 minutes      CODE  Minutes  Units   18295 OT Eval Low     53777 OT Eval Medium     86469 OT Eval High     42298 Fluidotherapy     67975 Manual 30 2   01543 Therapeutic Ex     99730 Therapeutic Activity 15 1   83759 ADL/COMP Tech Train     71374 Neuromuscular Re-Ed     84884 OrthoManagementTraining     05278 Paraffin     79774 Electrical Stim - Attended     95600 Iontophoresis     54938 Ultrasound      Other     Total  45 3       Plan: Continue to address:  [x]Bandaging  [x] Self Bandaging/Family Assist  [x]MLD  [x]Self Massage  [x]Exercise  [x]Education  []Obtain referral for garments  []Medical Hold  []Discharge to [facility], OTR/L, CLT

## 2021-08-12 ENCOUNTER — APPOINTMENT (OUTPATIENT)
Dept: OCCUPATIONAL THERAPY | Age: 66
End: 2021-08-12
Payer: MEDICARE

## 2021-09-09 ENCOUNTER — HOSPITAL ENCOUNTER (OUTPATIENT)
Dept: INFUSION THERAPY | Age: 66
Discharge: HOME | End: 2021-09-09
Payer: MEDICARE

## 2021-09-09 ENCOUNTER — HOSPITAL ENCOUNTER (OUTPATIENT)
Dept: GENERAL RADIOLOGY | Age: 66
Discharge: HOME | End: 2021-09-11
Payer: MEDICARE

## 2021-09-09 ENCOUNTER — OFFICE VISIT (OUTPATIENT)
Dept: ONCOLOGY | Age: 66
End: 2021-09-09
Payer: MEDICARE

## 2021-09-09 VITALS
DIASTOLIC BLOOD PRESSURE: 58 MMHG | BODY MASS INDEX: 28.66 KG/M2 | RESPIRATION RATE: 18 BRPM | HEART RATE: 90 BPM | TEMPERATURE: 97.2 F | SYSTOLIC BLOOD PRESSURE: 123 MMHG | WEIGHT: 172 LBS | HEIGHT: 65 IN | OXYGEN SATURATION: 99 %

## 2021-09-09 VITALS — WEIGHT: 172 LBS | BODY MASS INDEX: 28.66 KG/M2 | HEIGHT: 65 IN

## 2021-09-09 DIAGNOSIS — M81.0 OSTEOPOROSIS, UNSPECIFIED OSTEOPOROSIS TYPE, UNSPECIFIED PATHOLOGICAL FRACTURE PRESENCE: Primary | ICD-10-CM

## 2021-09-09 DIAGNOSIS — Z85.3 PERSONAL HISTORY OF BREAST CANCER: ICD-10-CM

## 2021-09-09 DIAGNOSIS — Z12.31 VISIT FOR SCREENING MAMMOGRAM: ICD-10-CM

## 2021-09-09 LAB
ALBUMIN SERPL-MCNC: 4.2 G/DL (ref 3.5–5.2)
ALP BLD-CCNC: 87 U/L (ref 35–104)
ALT SERPL-CCNC: 17 U/L (ref 0–32)
ANION GAP SERPL CALCULATED.3IONS-SCNC: 13 MMOL/L (ref 7–16)
AST SERPL-CCNC: 17 U/L (ref 0–31)
BASOPHILS ABSOLUTE: 0.02 E9/L (ref 0–0.2)
BASOPHILS RELATIVE PERCENT: 0.3 % (ref 0–2)
BILIRUB SERPL-MCNC: 0.5 MG/DL (ref 0–1.2)
BUN BLDV-MCNC: 12 MG/DL (ref 6–23)
CALCIUM SERPL-MCNC: 9.7 MG/DL (ref 8.6–10.2)
CHLORIDE BLD-SCNC: 99 MMOL/L (ref 98–107)
CO2: 22 MMOL/L (ref 22–29)
CREAT SERPL-MCNC: 1 MG/DL (ref 0.5–1)
EOSINOPHILS ABSOLUTE: 0.03 E9/L (ref 0.05–0.5)
EOSINOPHILS RELATIVE PERCENT: 0.5 % (ref 0–6)
GFR AFRICAN AMERICAN: >60
GFR NON-AFRICAN AMERICAN: 55 ML/MIN/1.73
GLUCOSE BLD-MCNC: 105 MG/DL (ref 74–99)
HCT VFR BLD CALC: 35.4 % (ref 34–48)
HEMOGLOBIN: 11.9 G/DL (ref 11.5–15.5)
IMMATURE GRANULOCYTES #: 0.02 E9/L
IMMATURE GRANULOCYTES %: 0.3 % (ref 0–5)
LYMPHOCYTES ABSOLUTE: 0.82 E9/L (ref 1.5–4)
LYMPHOCYTES RELATIVE PERCENT: 13.9 % (ref 20–42)
MCH RBC QN AUTO: 30 PG (ref 26–35)
MCHC RBC AUTO-ENTMCNC: 33.6 % (ref 32–34.5)
MCV RBC AUTO: 89.2 FL (ref 80–99.9)
MONOCYTES ABSOLUTE: 0.44 E9/L (ref 0.1–0.95)
MONOCYTES RELATIVE PERCENT: 7.5 % (ref 2–12)
NEUTROPHILS ABSOLUTE: 4.57 E9/L (ref 1.8–7.3)
NEUTROPHILS RELATIVE PERCENT: 77.5 % (ref 43–80)
PDW BLD-RTO: 12.8 FL (ref 11.5–15)
PLATELET # BLD: 298 E9/L (ref 130–450)
PMV BLD AUTO: 9.6 FL (ref 7–12)
POTASSIUM SERPL-SCNC: 4.4 MMOL/L (ref 3.5–5)
RBC # BLD: 3.97 E12/L (ref 3.5–5.5)
SODIUM BLD-SCNC: 134 MMOL/L (ref 132–146)
TOTAL PROTEIN: 7.9 G/DL (ref 6.4–8.3)
WBC # BLD: 5.9 E9/L (ref 4.5–11.5)

## 2021-09-09 PROCEDURE — 77063 BREAST TOMOSYNTHESIS BI: CPT

## 2021-09-09 PROCEDURE — 99213 OFFICE O/P EST LOW 20 MIN: CPT

## 2021-09-09 PROCEDURE — 99214 OFFICE O/P EST MOD 30 MIN: CPT | Performed by: INTERNAL MEDICINE

## 2021-09-09 PROCEDURE — 85025 COMPLETE CBC W/AUTO DIFF WBC: CPT

## 2021-09-09 PROCEDURE — 80053 COMPREHEN METABOLIC PANEL: CPT

## 2021-09-09 PROCEDURE — 36415 COLL VENOUS BLD VENIPUNCTURE: CPT

## 2021-09-09 RX ORDER — COLCHICINE 0.6 MG/1
0.6 TABLET ORAL
COMMUNITY
Start: 2021-08-26 | End: 2022-02-14

## 2021-09-09 RX ORDER — B-COMPLEX WITH VITAMIN C
TABLET ORAL
COMMUNITY
Start: 2021-08-27 | End: 2021-10-05

## 2021-09-09 RX ORDER — PANTOPRAZOLE SODIUM 40 MG/1
TABLET, DELAYED RELEASE ORAL
COMMUNITY
Start: 2021-08-26 | End: 2021-10-05

## 2021-09-09 NOTE — PROGRESS NOTES
Department of [facility] Oncology   Attending Clinic Note    Reason for Visit: Follow-up on a patient with Left Breast Cancer    PCP:  Rosalva Luis DO    History of Present Illness:  74year old female found to have lesion in upper inner quadrant of left breast    OBSTETRIC RELATED HISTORY:   Age of menarche was 13. Age of menopause was 53. Patient denies hormonal therapy. Patient is . Age of first live birth was 30. Patient did not breast feed. Is patient interested in fertility information about fertility preservation? No     Left breast US 2020:  1.4 cm oval nodule with indistinct and microlobulated margin left breast upper inner aspect middle depth. Suspicious of malignancy follow-up recommended    US guided left breast core biopsy 2020:  Scattered fibroglandular elements in both breasts  New 8 mm irregular density with indistinct margin in left breast at 12 oclock   -Atypical papillary lesion favor intracystic papillary carcinoma  -Atypical lobular hyperplasia with focal minute microcalcification  -Immunostaining for ER is +95% and NM is +95%  -CK 5/6 is negative and epithelial cells. E-cadherin is positive in the papillary lesion and negative in the atypical lobular hyperplasia. Left breast Needle Localized Lumpectomy on 2020: Invasive lobular and invasive ductal carcinoma with foci of lobular carcinoma in situ, ductal carcinoma in situ, low-grade, cribriform type and encapsulated papillary carcinoma. Margins of excision free of carcinoma    Comment:   The lumpectomy specimen contains a mixed invasive lobular (E-cadherin negative, 1.2 x 0.6 x 0.5 cm) and ductal carcinoma (E-cadherin positive, 1.5 x 1.3 x 1.1 cm).     The invasive lobular component has a Waldo score of 3+1+1 = 5 with adjacent areas of   lobular carcinoma in situ.  The invasive lobular and LCIS come to within 0.5 mm of the inked lateral margin of excision.    The invasive ductal carcinoma has a [place] score of 2+1+1 = 4 and has adjacent areas of   ductal carcinoma in situ, cribriform type, low nuclear grade as well as a 0.5 cm area of encapsulated papillary carcinoma (p63 negative for myoepithelial cells).    The remaining breast tissue shows focal intraductal papillomata as well as focal microcalcifications. Breast Cancer Marker Studies:   Estrogen Receptors (ER): 90%   Progesterone Receptors (AK): 90%   Her-2/lupillo (c-erb B-2) protein expression: Negative (0)      Clinical Stage I left breast cancer. Left axillary sentinel lymph node biopsy on 12/18/2020:  A.  [place] lymph node #1, biopsy: Four (4) reactive nodes, negative for malignancy. B.  [place] lymph node #2, biopsy: One (1) reactive node, negative for malignancy. C.  [place] lymph node #3, biopsy: Two (2) reactive nodes, negative for malignancy. pT1N0 M0 ER+AK+ HER2 -  Oncotype recurrence score: 0  Distant recurrence risk at 9 years with AI or PEREZ alone- 3%   Absolute chemotherapy benefit RS 0-10 All Ages: <1%  Recommended adjuvant RT followed by adjuvant endocrine therapy (Arimidex 1 mg po daily for 5 years). RT was started on 01/27/2021 and completed 02/17/2021. DEXA scan done at her PCP's office in [facility] 08/30/2020 Normal bone mineral density. Arimidex 1 mg po daily was started on 02/18/2021 with fair tolerance so far    Review of Systems;  CONSTITUTIONAL: No fever, chills. Fair appetite and energy level  ENMT: Eyes: No diplopia; Nose: No epistaxis. Mouth: No sore throat. RESPIRATORY: No hemoptysis, shortness of breath, cough. GASTROINTESTINAL: No nausea/vomiting, abdominal pain. GENITOURINARY: No dysuria, urinary frequency, hematuria. NEURO: No syncope, presyncope, headache.   Remainder:  ROS NEGATIVE    Past Medical History:      Diagnosis Date    Allergic rhinitis     seasonal    Breast cancer ([facility] [address] 2020    Left    Cancer ([facility] [address]     History of therapeutic radiation     Hypothyroidism Medications:  Reviewed and reconciled. Allergies:  No Known Allergies    Physical Exam:  Ht 5' 5\" (1.651 m)   Wt 172 lb (78 kg)   BMI 28.62 kg/m²   GENERAL: Alert, oriented x 3, not in distress  HEENT: PERRLA; EOMI. Oropharynx clear. NECK: Supple. Without lymphadenopathy. LUNGS: Good air entry bilaterally. No wheezing, crackles or ronchi. CARDIOVASCULAR: Regular rate. No murmurs, rubs or gallops. ABDOMEN: Soft. Non-tender, non-distended. Positive bowel sounds. EXTREMITIES: Without clubbing, cyanosis, or edema. NEUROLOGIC: No focal deficits. ECOG PS 1    Lab Results   Component Value Date    WBC 4.8 04/14/2021    HGB 13.7 04/14/2021    HCT 41.7 04/14/2021    MCV 93.5 04/14/2021     04/14/2021     Lab Results   Component Value Date     04/14/2021    K 4.0 04/14/2021     04/14/2021    CO2 27 04/14/2021    BUN 15 04/14/2021    CREATININE 1.3 (H) 04/14/2021    GLUCOSE 74 04/14/2021    CALCIUM 10.0 04/14/2021    PROT 7.3 04/14/2021    LABALBU 4.4 04/14/2021    BILITOT 0.3 04/14/2021    ALKPHOS 73 04/14/2021    AST 19 04/14/2021    ALT 19 04/14/2021    LABGLOM 41 04/14/2021    GFRAA 49 04/14/2021     Impression/Plan:  75 y/o female who underwent Left breast Needle Localized Lumpectomy on 12/4/2020: Invasive lobular and invasive ductal carcinoma with foci of lobular carcinoma in situ, ductal carcinoma in situ, low-grade, cribriform type and encapsulated papillary carcinoma. Margins of excision free of carcinoma    Comment:   The lumpectomy specimen contains a mixed invasive lobular (E-cadherin negative, 1.2 x 0.6 x 0.5 cm) and ductal carcinoma (E-cadherin positive, 1.5 x 1.3 x 1.1 cm).     The invasive lobular component has a Waldo score of 3+1+1 = 5 with adjacent areas of   lobular carcinoma in situ.  The invasive lobular and LCIS come to within 0.5 mm of the inked lateral margin of excision.    The invasive ductal carcinoma has a L[place] score of 2+1+1 = 4 and has adjacent areas of   ductal carcinoma in situ, cribriform type, low nuclear grade as well as a 0.5 cm area of encapsulated papillary carcinoma (p63 negative for myoepithelial cells).    The remaining breast tissue shows focal intraductal papillomata as well as focal microcalcifications. Breast Cancer Marker Studies:   Estrogen Receptors (ER): 90%   Progesterone Receptors (LA): 90%   Her-2/lupillo (c-erb B-2) protein expression: Negative (0)      Left axillary sentinel lymph node biopsy on 12/18/2020:  A.  [place] lymph node #1, biopsy: Four (4) reactive nodes, negative for malignancy. B.  [place] lymph node #2, biopsy: One (1) reactive node, negative for malignancy. C.  [place] lymph node #3, biopsy: Two (2) reactive nodes, negative for malignancy. pT1N0 M0 ER+ LA + HER/2 -  Oncotype recurrence score: 0  Distant recurrence risk at 9 years with AI or PEREZ alone- 3%   Absolute chemotherapy benefit RS 0-10 All Ages: <1%    Pathology report and NCCN guidelines reviewed with patient. No indication for adjuvant chemotherapy. Recommended adjuvant RT followed by adjuvant endocrine therapy (Arimidex 1 mg po daily for 5 years). RT was started on 01/27/2021 and completed 02/17/2021. DEXA scan done at her PCP's office in [facility] 08/30/2020 Normal bone mineral density. Arimidex 1 mg po daily was started on 02/18/2021 with fair tolerance so far. Left Breast U/S 07/08/2021 Benign findings with no sonographic evidence of malignancy in the left breast.   Bilateral Screening Mammogram today 09/09/2021. Imaging reviewed. Labs reviewed. MACK. Continue Arimidex, Ca/VitD.     RTC Feb 2022 DEXA scan same day    09/09/2021  Nguyen Hill MD

## 2021-09-16 ENCOUNTER — HOSPITAL ENCOUNTER (OUTPATIENT)
Dept: OCCUPATIONAL THERAPY | Age: 66
Setting detail: THERAPIES SERIES
Discharge: HOME | End: 2021-09-16
Payer: MEDICARE

## 2021-09-16 PROCEDURE — 97530 THERAPEUTIC ACTIVITIES: CPT | Performed by: OCCUPATIONAL THERAPIST

## 2021-09-16 NOTE — PROGRESS NOTES
Occupational Therapy  OCCUPATIONAL THERAPY DISCHARGE NOTE  111 [facility],4Th Floor [address]    Phone: [phone]  Fax: [phone]     Date:  2021  Initial Evaluation Date: 2021     Evaluating Therapist: Kandy Gaitan OT R/L,     Patient Name:  Macey Vogel    :  1955    Restrictions/Precautions:  fall risk  Diagnosis:  Lymphedema of the breast (I89.0)          Date of Surgery/Injury: n/a    Insurance/Certification information:  BSBS Medicare - [id number]  Plan of care signed (Y/N): Yes  Visit# / total visits: 5  Total Visits to date:  5 Cancels/No-shows to date: 0    Referring Practitioner:  JANNIE Sarmiento-CNP  Specific Practitioner Orders: Evaluation and Treatment     Assessment of current deficits   []Pain  []Skin Integrity   [x]Lymphedema   []Functional transfers/mobility   []ADLs   []Strength    []Cognition  []IADLs   []Safety Awareness   []  Motor Endurance    []Fine Motor Coordination   []Balance   []Vision/perception  []Sensation []Gross Motor Coordination  []ROM     OT PLAN OF CARE   OT POC based on physician orders, patient diagnosis and results of clinical assessment    Frequency/Duration:  Patient has met all goals and discharged from lymphedema clinic  Specific OT Treatment to include:     Plan of Care:     []33140-Manual Lymph Drainage and Combined Decongestive Therapy  []21796- Skilled Multilayer Short Stretch Compression Bandaging/ Therapeutic Exercise  []Skin Care Education []HEP including Self MLD Education and/or Self Bandaging  []Education for Lymphedema Risks/Precautions     [] Caregiver Education   []other:      Patient Specific Goal: to get rid of the swelling    Goals Status:    STG: 3 weeks  1.  Patient will demonstrate knowledge and understanding for lymphedema precautions, skin care and self management to decrease progression of lymphedema and risk of infection. Patient goal met.   2.  Patient will present with decreased limb volume in the involved extremity from moderate to mild for improved functional mobility. Therapist continued patient education regarding manual lymphatic drainage massage for breast involvement. Patient spouse assists with massage daily at home. Patient stated good relief once he has completed sequence. Therapist reviewed sequence and patient verbalized understanding. Patient presents with trace edema. Patient goal met. 3.  Patient will demonstrate compliance with compression therapy for independent management of lymphedema. Therapist continued patient education regarding compression garments. Patient received her compression bra and stated it fits well and works as intended. No issues at this time. Patient verbalized understanding if she has any trouble with garment to contact DME. Patient goal met.     LT weeks  1.  Patient will be independent with self management of lymphedema including understanding garment wear schedule, self compression bandaging, HEP and self care. Therapist continued patient education regarding self lymphedema management. Patient verbalized understanding of home program, wear schedule of garment, self care, and eating lifestyle. Patient able to perform independently with assist form spouse as needed. Patient goal met  2.  Patient will be fitted for appropriate compression garments for long term management of lymphedema. Patient has been fitted for her compression garment and has received. Patient wears daily. Patient garment fits well and works as intended. 3.   Patient will present with decreased limb volume in the involved extremity from mild to trace  for improved functional mobility. Patient presented with trace edema this treatment session. Patient stated her edema changes form day to day. Patient further stated she is able to manage with her compression garment and manual massage to keep at a trace level. Patient able to adjust management as needed.   Patient goal met. Significant Findings At Last Visit/Comments:    Patient made steady gains toward goals. Patient currently has met all goals and demonstrated independent management of her lymphedema. Patient current measurements as follows:    Lymphedema Upper Extremity Measurements     Measurements are made in 4cm increments and are circumferential.       CM Follow up #4  Left -  Follow up #3  Left -  Follow up #2  Left -  Follow up #1  Left - 16Sept.2021 Evaluation -   Left - 16July 2021 Follow up #4  Right -  Follow up #3  Right -  Follow up #2  Right -  Follow up #1 Right - 16Sept. 2021 Evaluation - Right -  16July 2021                            wrist        15.75 16        15.5 16   4cm        16.75 17.5        17.25 18   8cm        19 20.75        20 20.75   12cm        22.75 24.5        23 24   16cm        25 26        25 26   20cm        25.75 26.5        25.5 26.5   24cm        28 29        27.25 29   28cm        30 31.75        29.5 32   32cm        32.5 34.75        31 34   36cm        33 34.75        32 33.5   40cm                       44cm                                                                       plam        19.25 19.75        19.25 20      Fingers are measured in cm and between mp and pip and between pip and dip each digit.     Digit Follow up #4  Left -  Follow up #3  Left -  Follow up #2  Left -  Follow up #1  Left -  Evaluation -  Left -  Follow up #4  Right -  Follow up #3  Right -  Follow up #2  Right -  Follow up #1  Right -  Evaluation - Right -                                                    First Digit        6.25, 5.25 6.5, 5.5        6.5, 5.5 6.75, 5.5   Second Digit        6, 5.5 6.25, 5.75        6, 5.5 6.25, 5.75   Third Digit        5.5, 5 5.75, 5.25        5.5, 5.25 5.75, 5.5   Fourth Digit        5.5, 5 5.5, 5        5.5, 4.5 5.5, 4.75   Fifth Digit        6.25 6.25        6.25 6. 5                                     Restrictions/Precautions:  [] No blood pressure/blood draw in: [] Left Upper Extremity     [] Right Upper Extremity        [x] Fall Risk       Intervention:   []Other    Subjective: patient attended treatment session alone. Patient with no new issues      Rehab Potential: [] Excellent [x] Good [] Fair  [] Poor     Goal Status:  [x] Achieved [] Partially Achieved  [] Not Achieved     Patient Status: [x] Patient now discharged             Time In: 5660            Time Out: 0950                      Timed Code Treatment Minutes: 30 minutes      CODE  Minutes  Units   55034 OT Eval Low     71448 OT Eval Medium     77608 OT Eval High     31141 Fluidotherapy     05020 Manual     79681 Therapeutic Ex     64331 Therapeutic Activity 30 2   47921 ADL/COMP Tech Train     65021 Neuromuscular Re-Ed     32290 OrthoManagementTraining     88951 Paraffin     89029 Electrical Stim - Attended     [id number] Iontophoresis     30540 Ultrasound      Other       30 2     Electronically signed by: KAILASH Adames/L, [employer]      Physician's Certification / Comments         I have reviewed the Plan of Care established for skilled therapy services and certify that the services are required and that they will be provided while the patient is under my care. Physician's Comments/Revisions:                   Physicians's Printed Name:  AMBROSE Ruffin                                   Physician's Signature:                                                               Date:      Please review Patient's OT evaluation and if you agree sign/date and fax back to us at our [address] [facility] Fax: [phone].  Thank you for your referral!

## 2021-10-01 ASSESSMENT — ENCOUNTER SYMPTOMS
SINUS PRESSURE: 0
EYE ITCHING: 0
TROUBLE SWALLOWING: 0
ABDOMINAL DISTENTION: 0
BLOOD IN STOOL: 0
BACK PAIN: 0
CHOKING: 0
CONSTIPATION: 0
SHORTNESS OF BREATH: 0
DIARRHEA: 0
COUGH: 0
RHINORRHEA: 0
CHEST TIGHTNESS: 0
ABDOMINAL PAIN: 0
VOICE CHANGE: 0
WHEEZING: 0
SINUS PAIN: 0
VOMITING: 0
NAUSEA: 0
EYE DISCHARGE: 0
SORE THROAT: 0

## 2021-10-01 NOTE — PROGRESS NOTES
Subjective: This note was copied forward from the last encounter. Essential components for this patient record were reviewed and verified on this visit including:  recent hospitalizations, recent imaging, PMH, PSH, FH, SOC HX, Allergies, and Medications were reviewed and updated as appropriate. In addition, the assessment and plan were copied from prior office note and updated accordingly. Patient ID: Truman Steward is a 74 y.o. female. HPI      Patient's Name/Date of Birth: Truman Steward / 1955    Left breast US 9/18/2020:  1.4 cm oval nodule with indistinct and microlobulated margin left breast upper inner aspect middle depth. Suspicious of malignancy follow-up recommended     US guided left breast core biopsy 9/29/2020:  Scattered fibroglandular elements in both breasts  New 8 mm irregular density with indistinct margin in left breast at 12 oclock   -Atypical papillary lesion favor intracystic papillary carcinoma  -Atypical lobular hyperplasia with focal minute microcalcification  -Immunostaining for ER is +95% and SD is +95%  -CK 5/6 is negative and epithelial cells. E-cadherin is positive in the papillary lesion and negative in the atypical lobular hyperplasia.      Left breast Needle Localized Lumpectomy on 12/4/2020: Invasive lobular and invasive ductal carcinoma with foci of lobular carcinoma in situ, ductal carcinoma in situ, low-grade, cribriform type and encapsulated papillary carcinoma. Margins of excision free of carcinoma     Comment:   The lumpectomy specimen contains a mixed invasive lobular (E-cadherin negative, 1.2 x 0.6 x 0.5 cm) and ductal carcinoma (E-cadherin positive, 1.5 x 1.3 x 1.1 cm).     The invasive lobular component has a Bunkie score of 3+1+1 = 5 with adjacent areas of   lobular carcinoma in situ.  The invasive lobular and LCIS come to within 0.5 mm of the inked lateral margin of excision.    The invasive ductal carcinoma has a Bunkie score of 2+1+1 = 4 and has adjacent areas of   ductal carcinoma in situ, cribriform type, low nuclear grade as well as a 0.5 cm area of encapsulated papillary carcinoma (p63 negative for myoepithelial cells).    The remaining breast tissue shows focal intraductal papillomata as well as focal microcalcifications. Breast Cancer Marker Studies:   Estrogen Receptors (ER): 90%   Progesterone Receptors (MD): 90%   Her-2/lupillo (c-erb B-2) protein expression: Negative (0)      Clinical Stage I left breast cancer. Left axillary sentinel lymph node biopsy on 2020:  A.  [place] lymph node #1, biopsy: Four (4) reactive nodes, negative for malignancy. B.  [place] lymph node #2, biopsy: One (1) reactive node, negative for malignancy. C.  [place] lymph node #3, biopsy: Two (2) reactive nodes, negative for malignancy.      pT1N0 M0 ER+MD+ HER2 -  Oncotype recurrence score: 0  Distant recurrence risk at 9 years with AI or PEREZ alone- 3%   Absolute chemotherapy benefit RS 0-10 All Ages: <1%  Recommended adjuvant RT followed by adjuvant endocrine therapy (Arimidex 1 mg po daily for 5 years). RT was started on 2021 and completed 2021. DEXA scan done at her PCP's office in [facility] 2020 Normal bone mineral density. Imaging reviewed.     Arimidex 1 mg po daily was started on 2021. Night sweats noted. She declined starting Effexor 37.7 mg po daily. + constipation noted as well. OBSTETRIC RELATED HISTORY:  Age of menarche was 13. Age of menopause was 53. Patient denies hormonal therapy. Patient is . Age of first live birth was 30. Patient did not breast feed. Is patient interested in fertility information about fertility preservation?  No    CANCER SURVEILLANCE HISTORY:  Mammograms: Yes  Breast MRI's: No   Breast Biopsies: Yes   Colonoscopy: Yes   GI Polyps: Yes   EGD: Yes   Pelvic Exam: Yes   Pap Smear: Yes   Dermatology: No   Lung screening: no      Have you received your flu vaccination this year? Yes  Have you received your Pneumococcal vaccination? Yes      Estimated body mass index is 28.62 kg/m² as calculated from the following:    Height as of 9/9/21: 5' 5\" (1.651 m). Weight as of 9/9/21: 172 lb (78 kg). Bra Size: 42d    Because violence is so common, we ask all our patients: are you in a relationship or do you live with a person who threatens, hurts, or controls you:  denies    Patient drinks moderate caffeinated beverages. Patient does not smoke cigarettes. Patient does not use recreational drugs. Patient admits to drinking 4 cans beer a day      Past Medical History:   Diagnosis Date    Allergic rhinitis     seasonal    Breast cancer ([facility] 2020    Left    Cancer ([facility] [address] [facility]     History of therapeutic radiation     left breast ca 2020    Hypothyroidism        Past Surgical History:   Procedure Laterality Date    BREAST BIOPSY Left 12/18/2020    LEFT AXILLARY SENTINEL LYMPH NODE BIOPSY--NEOPROBE performed by Torrie Cook MD at [facility] LUMPECTOMY Left 12/4/2020    LEFT BREAST NEEDLE LOCALIZED LUMPECTOMY --TRIDENT performed by Torrie Cook MD at [place] [address]  2016    TUBAL LIGATION         Current Outpatient Medications   Medication Sig Dispense Refill    TRI-BUFFERED ASPIRIN 325 MG TABS TAKE 2 TABLETS BY MOUTH THREE TIMES DAILY      colchicine (COLCRYS) 0.6 MG tablet 0.6 mg      pantoprazole (PROTONIX) 40 MG tablet TAKE ONE TABLET BY MOUTH EVERY DAY BEFORE A MEAL      anastrozole (ARIMIDEX) 1 MG tablet Take 1 tablet by mouth daily 90 tablet 1    MOMETASONE FUROATE IN       levothyroxine (SYNTHROID) 50 MCG tablet Take 50 mcg by mouth Daily       No current facility-administered medications for this visit.        No Known Allergies    Family History   Problem Relation Age of Onset    Cancer Father 79        prostate cancer    Cancer Brother 54        prostate    Cancer Brother 54        prostate cancer       Social History     Socioeconomic History  Marital status:      Spouse name: Not on file    Number of children: 2    Years of education: Not on file    Highest education level: Not on file   Occupational History    Occupation: [employer] [employer]   Tobacco Use    Smoking status: Former Smoker     Packs/day: 1.00     Years: 25.00     Pack years: 25.00     Quit date:      Years since quittin.7    Smokeless tobacco: Never Used   Substance and Sexual Activity    Alcohol use: Yes     Alcohol/week: 4.0 standard drinks     Types: 4 Cans of beer per week     Comment: has drank for years    Drug use: Never    Sexual activity: Not on file   Other Topics Concern    Not on file   Social History Narrative    Not on file     Social Determinants of Health     Financial Resource Strain:     Difficulty of Paying Living Expenses:    Food Insecurity:     Worried About Running Out of Food in the Last Year:     [address] in the Last Year:    Transportation Needs:     Lack of Transportation (Medical):  Lack of Transportation (Non-Medical):    Physical Activity:     Days of Exercise per Week:     Minutes of Exercise per Session:    Stress:     Feeling of Stress :    Social Connections:     Frequency of Communication with Friends and Family:     Frequency of Social Gatherings with Friends and Family:     Attends [employer] Services:     Active Member of Clubs or Organizations:     Attends Club or Organization Meetings:     Marital Status:    Intimate Partner Violence:     Fear of Current or Ex-Partner:     Emotionally Abused:     Physically Abused:     Sexually Abused:        Occupation: retired. Worked customer service prior. Review of Systems   Constitutional: Positive for fatigue (Stable). Negative for activity change, appetite change, chills, fever and unexpected weight change. Generally feels well. She had pericarditis and was admitted to the hospital in [place].   She is scheduled for repeat ECHO and Cardiology consult on 11/29/2021. HENT: Negative for congestion, postnasal drip, rhinorrhea, sinus pressure, sinus pain, sore throat, trouble swallowing and voice change. Eyes: Negative for discharge, itching and visual disturbance. Respiratory: Negative for cough, choking, chest tightness, shortness of breath and wheezing. Cardiovascular: Negative for chest pain, palpitations and leg swelling. Gastrointestinal: Negative for abdominal distention, abdominal pain, blood in stool, constipation, diarrhea, nausea and vomiting. Endocrine: Negative for cold intolerance and heat intolerance. Genitourinary: Negative for difficulty urinating, dysuria, frequency and hematuria. Musculoskeletal: Negative for arthralgias, back pain, gait problem, joint swelling, myalgias, neck pain and neck stiffness. Allergic/Immunologic: Negative for environmental allergies and food allergies. Neurological: Negative for dizziness, seizures, syncope, speech difficulty, weakness, light-headedness and headaches. Hematological: Negative for adenopathy. Does not bruise/bleed easily. Psychiatric/Behavioral: Negative for agitation, confusion and decreased concentration. The patient is not nervous/anxious. Patient denies previous history of DVT/PE. Objective:   Physical Exam  Vitals and nursing note reviewed. Exam conducted with a chaperone present. Constitutional:       General: She is not in acute distress. Appearance: She is well-developed. She is not diaphoretic. Comments: ECOG 0; pleasant and conversant. HENT:      Head: Normocephalic and atraumatic. Mouth/Throat:      Pharynx: No oropharyngeal exudate. Eyes:      General: No scleral icterus. Right eye: No discharge. Left eye: No discharge. Conjunctiva/sclera: Conjunctivae normal.      Pupils: Pupils are equal, round, and reactive to light. Neck:      Thyroid: No thyromegaly. Vascular: No JVD.       Trachea: No tracheal deviation. Cardiovascular:      Rate and Rhythm: Regular rhythm. Bradycardia present. Heart sounds: Normal heart sounds. No murmur heard. No friction rub. No gallop. Comments: Apical 48; regular. Pulmonary:      Effort: Pulmonary effort is normal. No respiratory distress or retractions. Breath sounds: Normal breath sounds. No stridor. No wheezing or rales. Chest:      Chest wall: No mass, lacerations, deformity, swelling, tenderness or edema. Breasts: Breasts are symmetrical.         Right: No inverted nipple, mass, nipple discharge, skin change or tenderness. Left: No inverted nipple, mass, nipple discharge, skin change or tenderness. Comments: Right breast exam is stable and unremarkable. Abdominal:      General: There is no distension. Palpations: Abdomen is soft. Tenderness: There is no abdominal tenderness. There is no guarding or rebound. Musculoskeletal:         General: No tenderness or deformity. Normal range of motion. Right shoulder: Normal. Normal range of motion. Left shoulder: Normal. Normal range of motion. Cervical back: Normal range of motion and neck supple. Lymphadenopathy:      Cervical: No cervical adenopathy. Right cervical: No superficial, deep or posterior cervical adenopathy. Left cervical: No superficial, deep or posterior cervical adenopathy. Upper Body:      Right upper body: No supraclavicular or pectoral adenopathy. Left upper body: No supraclavicular or pectoral adenopathy. Skin:     General: Skin is warm and dry. Coloration: Skin is not pale. Findings: No erythema or rash. Neurological:      Mental Status: She is alert and oriented to person, place, and time. Coordination: Coordination normal.   Psychiatric:         Behavior: Behavior normal.         Thought Content:  Thought content normal.         Judgment: Judgment normal.        Assessment:     74 y.o. pleasant woman who underwent    Left breast US 9/18/2020:  1.4 cm oval nodule with indistinct and microlobulated margin left breast upper inner aspect middle depth. Suspicious of malignancy follow-up recommended     US guided left breast core biopsy 9/29/2020:  Scattered fibroglandular elements in both breasts  New 8 mm irregular density with indistinct margin in left breast at 12 oclock   -Atypical papillary lesion favor intracystic papillary carcinoma  -Atypical lobular hyperplasia with focal minute microcalcification  -Immunostaining for ER is +95% and VA is +95%  -CK 5/6 is negative and epithelial cells. E-cadherin is positive in the papillary lesion and negative in the atypical lobular hyperplasia.      Left breast Needle Localized Lumpectomy on 12/4/2020: Invasive lobular and invasive ductal carcinoma with foci of lobular carcinoma in situ, ductal carcinoma in situ, low-grade, cribriform type and encapsulated papillary carcinoma. Margins of excision free of carcinoma     Comment:   The lumpectomy specimen contains a mixed invasive lobular (E-cadherin negative, 1.2 x 0.6 x 0.5 cm) and ductal carcinoma (E-cadherin positive, 1.5 x 1.3 x 1.1 cm).    The invasive lobular component has a Waldo score of 3+1+1 = 5 with adjacent areas of   lobular carcinoma in situ.  The invasive lobular and LCIS come to within 0.5 mm of the inked lateral margin of excision.    The invasive ductal carcinoma has a Waldo score of 2+1+1 = 4 and has adjacent areas of   ductal carcinoma in situ, cribriform type, low nuclear grade as well as a 0.5 cm area of encapsulated papillary carcinoma (p63 negative for myoepithelial cells).    The remaining breast tissue shows focal intraductal papillomata as well as focal microcalcifications. Breast Cancer Marker Studies:   Estrogen Receptors (ER): 90%   Progesterone Receptors (VA): 90%   Her-2/lupillo (c-erb B-2) protein expression: Negative (0)      Clinical Stage I left breast cancer.    Left axillary sentinel lymph node biopsy on 12/18/2020:  A.  [place] lymph node #1, biopsy: Four (4) reactive nodes, negative for malignancy. B.  [place] lymph node #2, biopsy: One (1) reactive node, negative for malignancy. C.  [place] lymph node #3, biopsy: Two (2) reactive nodes, negative for malignancy.      pT1N0 M0 ER+MO+ HER2 -  Oncotype recurrence score: 0    -Completed adjuvant RT on 02/17/2021.   -02/18/2021 Arimidex 1 mg po daily was started.   -07/08/2021: LEFT BREAST ULTRASOUND: [facility]: Benign, BI-RADS 2.  -07/08/2021 Clinical follow up is without evidence of recurrent disease. She has post-treatment changes of the left breast and grade II lymphedema of the left breast.  Will refer to OT. She has bradycardia with fatigue. We discussed in detail. She had a work-up in 2020 prior to her breast cancer diagnosis that included EKG, Holter Monitor and cardiology evaluation @ [facility].  She did not follow up. Offered cardiology referral; she prefers to contact the cardiologist team at [place], which is certainly reasonable. She will call to make an appointment.    -09/09/2021 bilateral screening mammogram: Negative, BI-RADS 1.  -09/16/2021 completed OT for lymphedema of the left breast  -10/01/2021 clinical follow-up: Is without evidence of recurrent disease. Imaging reviewed, including her BI-RADS result. Lymphedema of the left breast is improved post completion of OT. She was admitted to [facility] for pericarditis; follow-up with cardiology and repeat echo on 11/29/2021. Continue BSE. Plan:   1. Continue monthly breast/chest wall self examination; detailed instructions reviewed today. Bring any changes to your physician's attention. 2. Continue healthy diet and exercise routinely as tolerated. 3. Maintaining ideal body weight (20-25 BMI) may lead to optimal breast cancer outcomes. 4. Avoid alcohol. 5. Repeat mammogram September 2022-not ordered.   6. Continue Arimidex 1 mg daily at the discretion of medical oncology team.  7. Ca+/VitD while on Arimidex. 8. Continue follow up with Medical Oncology and Primary Care. 9. RTC February 2022; same day as medical oncology follow-up due to travel distance. During today's visit, face-to-face time 25 minutes, greater than 50% in counseling education and coordination of care. All questions were answered to her apparent satisfaction, and she is agreeable to the plan as outlined above. Sherita Castillo, RN, MSN, APRN-CNP, [address]  Advanced Oncology Certified Nurse Practitioner  Department of Breast Surgery  [facility]/  [facility] in collaboration with Dr. Anyi Bermudez.  Vianey/Reta Burleson

## 2021-10-05 ENCOUNTER — OFFICE VISIT (OUTPATIENT)
Dept: BREAST CENTER | Age: 66
End: 2021-10-05
Payer: MEDICARE

## 2021-10-05 ENCOUNTER — HOSPITAL ENCOUNTER (OUTPATIENT)
Dept: RADIATION ONCOLOGY | Age: 66
Discharge: HOME | End: 2021-10-05
Attending: RADIOLOGY
Payer: MEDICARE

## 2021-10-05 VITALS
WEIGHT: 170.1 LBS | BODY MASS INDEX: 28.31 KG/M2 | TEMPERATURE: 97.8 F | DIASTOLIC BLOOD PRESSURE: 78 MMHG | HEART RATE: 84 BPM | SYSTOLIC BLOOD PRESSURE: 136 MMHG | RESPIRATION RATE: 18 BRPM

## 2021-10-05 VITALS
RESPIRATION RATE: 22 BRPM | BODY MASS INDEX: 28.32 KG/M2 | SYSTOLIC BLOOD PRESSURE: 122 MMHG | TEMPERATURE: 97 F | HEIGHT: 65 IN | HEART RATE: 68 BPM | OXYGEN SATURATION: 98 % | DIASTOLIC BLOOD PRESSURE: 62 MMHG | WEIGHT: 170 LBS

## 2021-10-05 DIAGNOSIS — Z17.0 MALIGNANT NEOPLASM OF LEFT BREAST IN FEMALE, ESTROGEN RECEPTOR POSITIVE, UNSPECIFIED SITE OF BREAST (HCC): Primary | ICD-10-CM

## 2021-10-05 DIAGNOSIS — Z85.3 PERSONAL HISTORY OF BREAST CANCER: ICD-10-CM

## 2021-10-05 DIAGNOSIS — C50.912 MALIGNANT NEOPLASM OF LEFT BREAST IN FEMALE, ESTROGEN RECEPTOR POSITIVE, UNSPECIFIED SITE OF BREAST (HCC): Primary | ICD-10-CM

## 2021-10-05 PROCEDURE — 99213 OFFICE O/P EST LOW 20 MIN: CPT | Performed by: NURSE PRACTITIONER

## 2021-10-05 PROCEDURE — 99213 OFFICE O/P EST LOW 20 MIN: CPT

## 2021-10-05 RX ORDER — MULTIVIT-MIN/IRON/FOLIC ACID/K 18-600-40
CAPSULE ORAL
COMMUNITY

## 2021-10-05 RX ORDER — ASPIRIN 81 MG/1
81 TABLET ORAL DAILY
COMMUNITY
End: 2022-02-14

## 2021-10-05 NOTE — PROGRESS NOTES
Radiation Oncology   Follow Up Note        10/5/2021    Pedrito Hernandez  Vitals:    10/05/21 1310   BP: 136/78   Pulse: 84   Resp: 18   Temp: 97.8 °F (36.6 °C)   TempSrc: Temporal   Weight: 170 lb 1.6 oz (77.2 kg)     Wt Readings from Last 3 Encounters:   10/05/21 170 lb 1.6 oz (77.2 kg)   09/09/21 172 lb (78 kg)   09/09/21 172 lb (78 kg)         CC: Patient is here for follow up for post-radiation completion. HPI:  Pedrito Hernandez is a pleasant 74 y.o. female with a diagnosis of left breast cancer (ER/CA+, Her2-), s/p BCS and adjuvant XRT. The patient underwent a course of radiation therapy to the left breast, which was completed on 2/17/21. She completed 4256 cGy in 16 fractions. States that she is doing well and skin has healed since radiation completion. Completes monthly self breast exams. Denies any new lumps/ bumps or discharge from the nipple. Lymphedema noted to left breast. Patient is following with Lymphedema Therapy and completing exercises as instructed. Last mammogram completed 9/9/21, see results below. Pt is following with Dr Sam Berman for medical oncology. Started on Arimidex, Notes night sweats and hot flashes. She also notes slight mood changes and hip aching. She feels this is tolerable enough that she does not want to take Effexor at this time to try to help with the side effects. Next appt 2/14/22. Pt is following with AMBROSE Linares for breast surgery. Next appt today.     Past Medical History:   Diagnosis Date    Allergic rhinitis     seasonal    Breast cancer ([address] 2020    Left    Cancer ([address]     History of therapeutic radiation     left breast ca 2020    Hypothyroidism          Past Surgical History:   Procedure Laterality Date    BREAST BIOPSY Left 12/18/2020    LEFT AXILLARY SENTINEL LYMPH NODE BIOPSY--NEOPROBE performed by Ariel Pantoja MD at . [address] LUMPECTOMY Left 12/4/2020    LEFT BREAST NEEDLE LOCALIZED LUMPECTOMY --TRIDENT performed Outpatient Medications   Medication Sig Dispense Refill    Cholecalciferol (VITAMIN D) 50 MCG (2000 UT) CAPS capsule Take by mouth      TRI-BUFFERED ASPIRIN 325 MG TABS TAKE 2 TABLETS BY MOUTH THREE TIMES DAILY      colchicine (COLCRYS) 0.6 MG tablet 0.6 mg      anastrozole (ARIMIDEX) 1 MG tablet Take 1 tablet by mouth daily 90 tablet 1    MOMETASONE FUROATE IN       levothyroxine (SYNTHROID) 50 MCG tablet Take 50 mcg by mouth Daily       No current facility-administered medications for this encounter. REVIEW OF SYSTEMS:       Constitutional:  No fever chills or rigors.  Eyes: No changes in vision, discharge, or pain   ENT: No Headaches, hearing loss or vertigo. No mouth sores or sore throat. No change in taste or smell.  Cardiovascular: No chest discomfort, dyspnea on exertion, palpitations or loss of consciousness or phlebitis.  Respiratory: Has no cough or wheezing, Has no sputum production. Has no hemoptysis, has no pleuritic pain.  Gastrointestinal: No abdominal pain, appetite loss, blood in stools. No change in bowel habits. No hematemesis    Genitourinary: Patient acknowledges no dysuria, trouble voiding, or hematuria. No nocturia or increased frequency.  Musculoskeletal: No gait disturbance, weakness or joint complaints.  Integumentary: No rash or pruritis.  Neurological: No headache, diplopia, change in muscle strength, numbness or tingling. No change in gait, balance, coordination, mood, affect, memory, mentation, behavior.  Psychiatric: No anxiety, or depression.  Endocrine: No temperature intolerance. No excessive thirst, fluid intake, or urination. No tremor.  Hematologic/Lymphatic: No abnormal bruising or bleeding, blood clots or swollen lymph nodes.  Allergic/Immunologic: No nasal congestion or hives.             PHYSICAL EXAMINATION:   Vitals:    10/05/21 1310   BP: 136/78   Pulse: 84   Resp: 18   Temp: 97.8 °F (36.6 °C)   TempSrc: Temporal   Weight: 170 lb 1.6 oz (77.2 kg)     Constitutional: A well developed, well nourished 74 y.o. female who is alert, oriented, cooperative and in no apparent distress. Head was normocephalic and atraumatic. EENT: EOMI VEGA. MMM. No icterus. Neck: Trachea was midline. Respiratory: Chest expansion was symmetrical. Breath sounds bilaterally were clear to auscultation. No wheezes, rhonchi or rales. No intercostal retraction or use of accessory muscles. Cardiovascular: Regular without murmur, clicks, gallops or rubs. Abdomen: Obese, rounded and soft without organomegaly. No rebound, guarding or  rigidity. Lymphatic: No lymphadenopathy. Musculoskeletal: Ambulates without assistance. Normal curvature of the spine. No gross muscle weakness. Muscle size, tone and strength are normal. No involuntary movements. Extremities:  No lower extremity edema, ulcerations, tenderness, varicosities or erythema. Coordination appears adequate. Sensory function appears intact. Skin:  Warm and dry. Examination of color, turgor and pigmentation was relatively normal. No bruises or skin rashes. Old surgical scars are noted. Neurological/Psychiatric: General behavior, level of consciousness, thought content is normal. The patient's emotional status is normal.  Cranial nerves II-XII are grossly intact. Breasts: Right breast normal without mass, skin or nipple changes or axillary nodes.  Left breast surgical scars noted with mild lymphedema to breast (mostly to the lower portion of the breast), but otherwise with no mass, nipple changes, or axillary nodes      IMAGING:    MAMMOGRAM, 9/9/21:   Impression   No mammographic evidence of malignancy in either breast.       RECOMMENDATION:       Annual bilateral mammogram is advised with consideration for annual bilateral   screening ultrasound given the patient's breast density.       BIRADS:   BIRADS - CATEGORY 1       Negative.       OVERALL ASSESSMENT - NEGATIVE ASSESSMENT/PLAN:    Left breast cancer (ER/IA+, Her2-), s/p BCS and adjuvant radiation therapy to the left breast completed on 2/17/21 (4256 cGy in 16 fractions). Patient is doing well post-radiation completion. Skin care reviewed. Signs and symptoms of recurrence reviewed. Encouraged monthly self breast exams. Imaging per med onc/ breast surgery. Lymphedema: Noted mildly to left breast. Patient is following with Lymphedema Therapy and completing exercises as instructed. Last mammogram completed 9/9/21, no evidence of malignancy. Cont to follow with Dr Christianne Park for medical oncology. Started on Arimidex, Notes night sweats and hot flashes. She also notes slight mood changes and hip aching. She feels this is tolerable enough that she does not want to take Effexor at this time to try to help with the side effects. Next appt 2/14/22. Cont to follow with AMBROSE Barraza for breast surgery. Next appt today. I discussed follow up plans with Alexy Caal. At this time, Dr Grover Monte will see the patient back on a PRN basis as she is following closely with med onc and breast surgery for her cancer care. Instructed to follow up with other providers involved in their care as directed (including but not limited to Medical Oncology, Primary Care, Pulmonary, and Surgery). The patient was given our contact number in the event that if at any time they change their mind and would like to return to the clinic to see either myself or one of the Radiation Oncologists, they can simply call us and we would be happy to see them. Thank you for involving us in the management of this extremely pleasant patient. More than 25 min was in direct contact with pt coordinating/giving care. >50% of the visit was spent in counseling the pt on the following: Follow up care    The nurses notes were reviewed and incorporated into this assessment and plan. Sincerely,    Sanna Yee, MSN, RN, APRN-CNP  Nurse Practitioner for Radiation Oncology    PHYSICIANS [place] [id number] [facility]) [facility]: [phone] ([facility]: [phone])  [address] [place] [address]: [phone] (YNU: [phone])  [facility]:  [phone] (EOF:  [phone])

## 2021-10-05 NOTE — PROGRESS NOTES
Malinda Martel  10/5/2021  1:12 PM      Vitals:    10/05/21 1310   BP: 136/78   Pulse: 84   Resp: 18   Temp: 97.8 °F (36.6 °C)    : Wt Readings from Last 3 Encounters:   10/05/21 170 lb 1.6 oz (77.2 kg)   09/09/21 172 lb (78 kg)   09/09/21 172 lb (78 kg)                Current Outpatient Medications:     Cholecalciferol (VITAMIN D) 50 MCG (2000 UT) CAPS capsule, Take by mouth, Disp: , Rfl:     TRI-BUFFERED ASPIRIN 325 MG TABS, TAKE 2 TABLETS BY MOUTH THREE TIMES DAILY, Disp: , Rfl:     colchicine (COLCRYS) 0.6 MG tablet, 0.6 mg, Disp: , Rfl:     anastrozole (ARIMIDEX) 1 MG tablet, Take 1 tablet by mouth daily, Disp: 90 tablet, Rfl: 1    MOMETASONE FUROATE IN, , Disp: , Rfl:     levothyroxine (SYNTHROID) 50 MCG tablet, Take 50 mcg by mouth Daily, Disp: , Rfl:       Patient is seen today in follow up for completion of RT L breast 1/27/21-2/17/21 16fx/4256cGy. Pt follows Dr. Cathy Pacheco and goes for a follow up on 2/14/21. Pt had a mammogram on 9/9/21 showing no evidence of met. Disease. Pt has a follow up appt today at Dr. Cresencio Machado. Pt has no questions or concerns at this time. FALLS RISK SCREENING ASSESSMENT3    Instructions:  Assess the patient and enter the appropriate indicators that are present for fall risk identification. Total the numbers entered and assign a fall risk score from Table 2.  Reassess patient at a minimum every 12 weeks or with status change. Assessment   Date  10/5/2021     1. Mental Ability: confusion/cognitively impaired No - 0       2. Elimination Issues: incontinence, frequency No - 0       3. Ambulatory: use of assistive devices (walker, cane, off-loading devices), attached to equipment (IV pole, oxygen) No - 0     4. Sensory Limitations: dizziness, vertigo, impaired vision No - 0       5. Age 72 years or greater - 1       10. Medication: diuretics, strong analgesics, hypnotics, sedatives, antihypertensive agents   No - 0   7.   Falls:  recent history of falls within the last 3 months (not to include slipping or tripping)   No - 0   TOTAL 1    If score of 4 or greater was education given? No       TABLE 2   Risk Score Risk Level Plan of Care   0-3 Little or  No Risk 1. Provide assistance as indicated for ambulation activities  2. Reorient confused/cognitively impaired patient  3. Call-light/bell within patient's reach  4. Chair/bed in low position, stretcher/bed with siderails up except when performing patient care activities  5. Educate patient/family/caregiver on falls prevention  6.  Reassess in 12 weeks or with any noted change in patient condition which places them at a risk for a fall   4-6 Moderate Risk 1. Provide assistance as indicated for ambulation activities  2. Reorient confused/cognitively impaired patient  3. Call-light/bell within patient's reach  4. Chair/bed in low position, stretcher/bed with siderails up except when performing patient care activities  5. Educate patient/family/caregiver on falls prevention  6. Falls risk precaution (Yellow sticker Level II) placed on patient chart   7 or   Higher High Risk 1. Place patient in easily observable treatment room  2. Patient attended at all times by family member or staff  3. Provide assistance as indicated for ambulation activities  4. Reorient confused/cognitively impaired patient  5. Call-light/bell within patient's reach  6. Chair/bed in low position, stretcher/bed with siderails up except when performing patient care activities  7. Educate patient/family/caregiver on falls prevention  8. Falls risk precaution (Yellow sticker Level III) placed on patient chart           MALNUTRITION RISK SCREENING ASSESSMENT    10/5/2021   Patient:  Ericka Salgado  Sex:  female    Instructions:  Assess the patient and enter the appropriate indicators that are present for nutrition risk identification. Total the numbers entered and assign a risk score. Follow the appropriate action for total score listed below. Assessment   Date  10/5/2021     1. Have you lost weight without trying? 0- No     2. Have you been eating poorly because of a decreased appetite? 0- No   3. Do you have a diagnosis of head and neck cancer?       0- No                                                                                    TOTAL 0          Score of 0-1: No action  Score 2 or greater:  · For Non-Diabetic Patient: Recommend adding Ensure Complete 2 x daily and provide patient with Ensure wellness bag with coupons  · For Diabetic Patient: Recommend adding Glucerna Shake 2 x daily and provide patient with Glucerna Wellness bag with coupons  · Route to the dietitian via Erma Caballero RN

## 2021-10-12 RX ORDER — ANASTROZOLE 1 MG/1
TABLET ORAL
Qty: 90 TABLET | Refills: 0 | Status: SHIPPED
Start: 2021-10-12 | End: 2022-01-06 | Stop reason: SDUPTHER

## 2022-01-06 DIAGNOSIS — Z85.3 PERSONAL HISTORY OF BREAST CANCER: Primary | ICD-10-CM

## 2022-01-06 RX ORDER — ANASTROZOLE 1 MG/1
TABLET ORAL
Qty: 90 TABLET | Refills: 0 | Status: SHIPPED | OUTPATIENT
Start: 2022-01-06 | End: 2022-04-04 | Stop reason: SDUPTHER

## 2022-02-10 ENCOUNTER — TELEPHONE (OUTPATIENT)
Dept: BREAST CENTER | Age: 67
End: 2022-02-10

## 2022-02-10 ASSESSMENT — ENCOUNTER SYMPTOMS
ABDOMINAL PAIN: 0
CHOKING: 0
VOICE CHANGE: 0
SINUS PRESSURE: 0
NAUSEA: 0
BLOOD IN STOOL: 0
EYE DISCHARGE: 0
DIARRHEA: 0
EYE ITCHING: 0
COUGH: 0
VOMITING: 0
BACK PAIN: 0
WHEEZING: 0
CONSTIPATION: 0
ABDOMINAL DISTENTION: 0
TROUBLE SWALLOWING: 0
SINUS PAIN: 0
SORE THROAT: 0
CHEST TIGHTNESS: 0
SHORTNESS OF BREATH: 0
RHINORRHEA: 0

## 2022-02-10 NOTE — PROGRESS NOTES
Subjective: This note was copied forward from the last encounter. Essential components for this patient record were reviewed and verified on this visit including:  recent hospitalizations, recent imaging, PMH, PSH, FH, SOC HX, Allergies, and Medications were reviewed and updated as appropriate. In addition, the assessment and plan were copied from prior office note and updated accordingly. Patient ID: Christi Guillaume is a 74 y.o. female. HPI      Patient's Name/Date of Birth: Christi Guillaume / 1955    Left breast US 9/18/2020:  1.4 cm oval nodule with indistinct and microlobulated margin left breast upper inner aspect middle depth. Suspicious of malignancy follow-up recommended     US guided left breast core biopsy 9/29/2020:  Scattered fibroglandular elements in both breasts  New 8 mm irregular density with indistinct margin in left breast at 12 oclock   -Atypical papillary lesion favor intracystic papillary carcinoma  -Atypical lobular hyperplasia with focal minute microcalcification  -Immunostaining for ER is +95% and DC is +95%  -CK 5/6 is negative and epithelial cells. E-cadherin is positive in the papillary lesion and negative in the atypical lobular hyperplasia.      Left breast Needle Localized Lumpectomy on 12/4/2020: Invasive lobular and invasive ductal carcinoma with foci of lobular carcinoma in situ, ductal carcinoma in situ, low-grade, cribriform type and encapsulated papillary carcinoma. Margins of excision free of carcinoma     Comment:   The lumpectomy specimen contains a mixed invasive lobular (E-cadherin negative, 1.2 x 0.6 x 0.5 cm) and ductal carcinoma (E-cadherin positive, 1.5 x 1.3 x 1.1 cm).     The invasive lobular component has a Waldo score of 3+1+1 = 5 with adjacent areas of   lobular carcinoma in situ.  The invasive lobular and LCIS come to within 0.5 mm of the inked lateral margin of excision.    The invasive ductal carcinoma has a Waldo score of 2+1+1 = 4 and has adjacent areas of   ductal carcinoma in situ, cribriform type, low nuclear grade as well as a 0.5 cm area of encapsulated papillary carcinoma (p63 negative for myoepithelial cells).    The remaining breast tissue shows focal intraductal papillomata as well as focal microcalcifications. Breast Cancer Marker Studies:   Estrogen Receptors (ER): 90%   Progesterone Receptors ([place]): 90%   Her-2/lupillo (c-erb B-2) protein expression: Negative (0)      Clinical Stage I left breast cancer. Left axillary sentinel lymph node biopsy on 2020:  A.  [place] lymph node #1, biopsy: Four (4) reactive nodes, negative for malignancy. B.  [place]monate lymph node #2, biopsy: One (1) reactive node, negative for malignancy. C.  [place]ate lymph node #3, biopsy: Two (2) reactive nodes, negative for malignancy.      pT1N0 M0 ER+[place]+ HER2 -  Oncotype recurrence score: 0  Distant recurrence risk at 9 years with AI or PEREZ alone- 3%   Absolute chemotherapy benefit RS 0-10 All Ages: <1%  Recommended adjuvant RT followed by adjuvant endocrine therapy (Arimidex 1 mg po daily for 5 years). RT was started on 2021 and completed 2021. DEXA scan done at her PCP's office in [facility] 2020 Normal bone mineral density. Imaging reviewed.     2021 Arimidex 1 mg po daily was started. Night sweats noted. She declined starting Effexor 37.7 mg po daily. + constipation noted as well. OBSTETRIC RELATED HISTORY:  Age of menarche was 13. Age of menopause was 53. Patient denies hormonal therapy. Patient is . Age of first live birth was 30. Patient did not breast feed. Is patient interested in fertility information about fertility preservation? No    CANCER SURVEILLANCE HISTORY:  Colonoscopy: Yes   GI Polyps: Yes   EGD: Yes   Pelvic Exam: Yes   Pap Smear: Yes   Dermatology: No   Lung screening: no      Have you received your flu vaccination this year?  Yes  Have you received your Pneumococcal vaccination? Yes      Estimated body mass index is 28.29 kg/m² as calculated from the following:    Height as of 10/5/21: 5' 5\" (1.651 m). Weight as of 10/5/21: 170 lb (77.1 kg). Bra Size: 42d    Because violence is so common, we ask all our patients: are you in a relationship or do you live with a person who threatens, hurts, or controls you:  denies    Patient drinks moderate caffeinated beverages. Patient does not smoke cigarettes. Patient does not use recreational drugs. Patient admits to drinking 4 cans beer a day      Past Medical History:   Diagnosis Date    Allergic rhinitis     seasonal    Breast cancer [facility] [address] 2020    Left    Cancer ([facility] [address]     History of therapeutic radiation     left breast ca 2020    Hypothyroidism     Pericarditis 08/2021       Past Surgical History:   Procedure Laterality Date    BREAST BIOPSY Left 12/18/2020    LEFT AXILLARY SENTINEL LYMPH NODE BIOPSY--NEOPROBE performed by Eder Edgar [facility] at [facility] BREAST LUMPECTOMY Left 12/4/2020    LEFT BREAST NEEDLE LOCALIZED LUMPECTOMY --TRIDENT [facility]  2016    TUBAL LIGATION         Current Outpatient Medications   Medication Sig Dispense Refill    anastrozole (ARIMIDEX) 1 MG tablet TAKE ONE TABLET BY MOUTH DAILY 90 tablet 0    Cholecalciferol (VITAMIN D) 50 MCG (2000 UT) CAPS capsule Take by mouth      aspirin 81 MG EC tablet Take 81 mg by mouth daily      colchicine (COLCRYS) 0.6 MG tablet 0.6 mg      MOMETASONE FUROATE IN       levothyroxine (SYNTHROID) 50 MCG tablet Take 50 mcg by mouth Daily       No current facility-administered medications for this visit.        No Known Allergies    Family History   Problem Relation Age of Onset    Cancer Father 79        prostate cancer    Cancer Brother 54        prostate    Cancer Brother 54        prostate cancer       Social History     Socioeconomic History    Marital status:      Spouse name: Not on file    Number of prior.        Review of Systems   Constitutional: Negative for activity change, appetite change, chills, fatigue (Stable), fever and unexpected weight change. She is doing fair. She has newly diagnosed RCC on the left and is anticipating surgery this month. Lymphedema of the left breast improved post therapy. HENT: Negative for congestion, postnasal drip, rhinorrhea, sinus pressure, sinus pain, sore throat, trouble swallowing and voice change. Eyes: Negative for discharge, itching and visual disturbance. Respiratory: Negative for cough, choking, chest tightness, shortness of breath and wheezing. Cardiovascular: Negative for chest pain, palpitations and leg swelling. Gastrointestinal: Negative for abdominal distention, abdominal pain, blood in stool, constipation, diarrhea, nausea and vomiting. Endocrine: Negative for cold intolerance and heat intolerance. Genitourinary: Negative for difficulty urinating, dysuria, frequency and hematuria. Musculoskeletal: Negative for arthralgias, back pain, gait problem, joint swelling, myalgias, neck pain and neck stiffness. Allergic/Immunologic: Negative for environmental allergies and food allergies. Neurological: Negative for dizziness, seizures, syncope, speech difficulty, weakness, light-headedness and headaches. Hematological: Negative for adenopathy. Does not bruise/bleed easily. Psychiatric/Behavioral: Negative for agitation, confusion and decreased concentration. The patient is not nervous/anxious. Patient denies previous history of DVT/PE. Objective:   Physical Exam  Vitals and nursing note reviewed. Exam conducted with a chaperone present. Constitutional:       General: She is not in acute distress. Appearance: She is well-developed. She is not diaphoretic. Comments: ECOG remains stable at 0; pleasant and conversant. HENT:      Head: Normocephalic and atraumatic.       Mouth/Throat:      Pharynx: No oropharyngeal exudate. Eyes:      General: No scleral icterus. Right eye: No discharge. Left eye: No discharge. Conjunctiva/sclera: Conjunctivae normal.      Pupils: Pupils are equal, round, and reactive to light. Neck:      Thyroid: No thyromegaly. Vascular: No JVD. Trachea: No tracheal deviation. Cardiovascular:      Rate and Rhythm: Regular rhythm. Bradycardia present. Heart sounds: Normal heart sounds. No murmur heard. No friction rub. No gallop. Comments: Apical 48; regular. Pulmonary:      Effort: Pulmonary effort is normal. No respiratory distress or retractions. Breath sounds: Normal breath sounds. No stridor. No wheezing or rales. Chest:      Chest wall: No mass, lacerations, deformity, swelling, tenderness or edema. Breasts: Breasts are symmetrical.      Right: No inverted nipple, mass, nipple discharge, skin change, tenderness or supraclavicular adenopathy. Left: No inverted nipple, mass, nipple discharge, skin change, tenderness or supraclavicular adenopathy. Comments: Right breast exam remains stable and unremarkable. Abdominal:      General: There is no distension. Palpations: Abdomen is soft. Tenderness: There is no abdominal tenderness. There is no guarding or rebound. Musculoskeletal:         General: No tenderness or deformity. Normal range of motion. Right shoulder: Normal. Normal range of motion. Left shoulder: Normal. Normal range of motion. Cervical back: Normal range of motion and neck supple. Lymphadenopathy:      Cervical: No cervical adenopathy. Right cervical: No superficial, deep or posterior cervical adenopathy. Left cervical: No superficial, deep or posterior cervical adenopathy. Upper Body:      Right upper body: No supraclavicular or pectoral adenopathy. Left upper body: No supraclavicular or pectoral adenopathy. Skin:     General: Skin is warm and dry.       Coloration: Skin is not pale. Findings: No erythema or rash. Neurological:      Mental Status: She is alert and oriented to person, place, and time. Coordination: Coordination normal.   Psychiatric:         Behavior: Behavior normal.         Thought Content: Thought content normal.         Judgment: Judgment normal.        Assessment:     74 y.o. pleasant woman who underwent    Left breast US 9/18/2020:  1.4 cm oval nodule with indistinct and microlobulated margin left breast upper inner aspect middle depth. Suspicious of malignancy follow-up recommended     US guided left breast core biopsy 9/29/2020:  Scattered fibroglandular elements in both breasts  New 8 mm irregular density with indistinct margin in left breast at 12 oclock   -Atypical papillary lesion favor intracystic papillary carcinoma  -Atypical lobular hyperplasia with focal minute microcalcification  -Immunostaining for ER is +95% and SD is +95%  -CK 5/6 is negative and epithelial cells. E-cadherin is positive in the papillary lesion and negative in the atypical lobular hyperplasia.      Left breast Needle Localized Lumpectomy on 12/4/2020: Invasive lobular and invasive ductal carcinoma with foci of lobular carcinoma in situ, ductal carcinoma in situ, low-grade, cribriform type and encapsulated papillary carcinoma. Margins of excision free of carcinoma     Comment:   The lumpectomy specimen contains a mixed invasive lobular (E-cadherin negative, 1.2 x 0.6 x 0.5 cm) and ductal carcinoma (E-cadherin positive, 1.5 x 1.3 x 1.1 cm).     The invasive lobular component has a Waldo score of 3+1+1 = 5 with adjacent areas of   lobular carcinoma in situ.  The invasive lobular and LCIS come to within 0.5 mm of the inked lateral margin of excision.    The invasive ductal carcinoma has a Waldo score of 2+1+1 = 4 and has adjacent areas of   ductal carcinoma in situ, cribriform type, low nuclear grade as well as a 0.5 cm area of encapsulated papillary carcinoma (p63 negative for myoepithelial cells).    The remaining breast tissue shows focal intraductal papillomata as well as focal microcalcifications. Breast Cancer Marker Studies:   Estrogen Receptors (ER): 90%   Progesterone Receptors (TN): 90%   Her-2/lupillo (c-erb B-2) protein expression: Negative (0)      Clinical Stage I left breast cancer. Left axillary sentinel lymph node biopsy on 12/18/2020:  A.  [place] lymph node #1, biopsy: Four (4) reactive nodes, negative for malignancy. B.  Baker lymph node #2, biopsy: One (1) reactive node, negative for malignancy. C.  [place]aker lymph node #3, biopsy: Two (2) reactive nodes, negative for malignancy.      pT1N0 M0 ER+TN+ HER2 -  Oncotype recurrence score: 0    -2/17/2021 completed adjuvant radiation therapy.  -02/18/2021 Arimidex 1 mg po daily was started.   -07/08/2021: LEFT BREAST ULTRASOUND: [facility]: Benign, BI-RADS 2.  -07/08/2021 Clinical follow up is without evidence of recurrent disease. She has post-treatment changes of the left breast and grade II lymphedema of the left breast.  Will refer to OT. She has bradycardia with fatigue. We discussed in detail. She had a work-up in 2020 prior to her breast cancer diagnosis that included EKG, Holter Monitor and cardiology evaluation @ [facility].  She did not follow up. Offered cardiology referral; she prefers to contact the cardiologist team at [facility], which is certainly reasonable. She will call to make an appointment.    -09/09/2021 bilateral screening mammogram: Negative, BI-RADS 1.  -09/16/2021 completed OT for lymphedema of the left breast  -10/01/2021 clinical follow-up: Is without evidence of recurrent disease. Imaging reviewed, including her BI-RADS result. Lymphedema of the left breast is improved post completion of OT. She was admitted to [facility] for pericarditis; follow-up with cardiology and repeat echo on 11/29/2021.   Continue BSE.  -02/14/2022 clinical follow-up is without evidence of recurrent disease. Lymphedema of the left breast is stable post-OT. She has been diagnosed with Left RCC; scheduled for robotic neprhrectomy 02/23/2022. Plan:   1. Continue monthly breast/chest wall self examination; detailed instructions reviewed today. Bring any changes to your physician's attention. 2. Continue healthy diet and exercise routinely as tolerated. 3. Maintaining ideal body weight (20-25 BMI) may lead to optimal breast cancer outcomes. 4. Avoid alcohol. 5. Repeat mammogram September 2022-not ordered. 6. Continue Arimidex 1 mg daily at the discretion of medical oncology team.  7. Ca+/VitD while on Arimidex. 8. Continue follow up with Medical Oncology and Primary Care. 9. RTC September 2022; with bilateral screening mammogram same day. Prefers [facility] [place] location if possible. During today's visit, face-to-face time 15 minutes, greater than 50% in counseling education and coordination of care. All questions were answered to her apparent satisfaction, and she is agreeable to the plan as outlined above. Merced Bansal, RN, MSN, APRN-CNP, [address]  Advanced Oncology Certified Nurse Practitioner  Department of Breast Surgery  [facility] Breast [facility]/  [facility] in collaboration with Dr. Vinny Segovia.  Vianey/Reta Burleson

## 2022-02-14 ENCOUNTER — HOSPITAL ENCOUNTER (OUTPATIENT)
Dept: GENERAL RADIOLOGY | Age: 67
Discharge: HOME | End: 2022-02-16
Payer: MEDICARE

## 2022-02-14 ENCOUNTER — OFFICE VISIT (OUTPATIENT)
Dept: ONCOLOGY | Age: 67
End: 2022-02-14
Payer: MEDICARE

## 2022-02-14 ENCOUNTER — OFFICE VISIT (OUTPATIENT)
Dept: BREAST CENTER | Age: 67
End: 2022-02-14
Payer: MEDICARE

## 2022-02-14 ENCOUNTER — HOSPITAL ENCOUNTER (OUTPATIENT)
Dept: INFUSION THERAPY | Age: 67
Discharge: HOME | End: 2022-02-14
Payer: MEDICARE

## 2022-02-14 VITALS
SYSTOLIC BLOOD PRESSURE: 165 MMHG | RESPIRATION RATE: 16 BRPM | OXYGEN SATURATION: 100 % | HEART RATE: 56 BPM | TEMPERATURE: 97.8 F | DIASTOLIC BLOOD PRESSURE: 72 MMHG | WEIGHT: 168 LBS | HEIGHT: 65 IN | BODY MASS INDEX: 27.99 KG/M2

## 2022-02-14 VITALS
HEART RATE: 57 BPM | WEIGHT: 169 LBS | OXYGEN SATURATION: 97 % | TEMPERATURE: 97.6 F | BODY MASS INDEX: 28.16 KG/M2 | SYSTOLIC BLOOD PRESSURE: 132 MMHG | RESPIRATION RATE: 20 BRPM | DIASTOLIC BLOOD PRESSURE: 80 MMHG | HEIGHT: 65 IN

## 2022-02-14 DIAGNOSIS — Z12.31 VISIT FOR SCREENING MAMMOGRAM: Primary | ICD-10-CM

## 2022-02-14 DIAGNOSIS — M81.0 OSTEOPOROSIS, UNSPECIFIED OSTEOPOROSIS TYPE, UNSPECIFIED PATHOLOGICAL FRACTURE PRESENCE: ICD-10-CM

## 2022-02-14 DIAGNOSIS — Z85.3 PERSONAL HISTORY OF BREAST CANCER: Primary | ICD-10-CM

## 2022-02-14 DIAGNOSIS — Z85.3 PERSONAL HISTORY OF BREAST CANCER: ICD-10-CM

## 2022-02-14 PROBLEM — E07.9 DISORDER OF THYROID GLAND: Status: ACTIVE | Noted: 2021-06-30

## 2022-02-14 PROBLEM — H91.90 HEARING LOSS: Status: ACTIVE | Noted: 2021-06-30

## 2022-02-14 PROBLEM — F41.1 GENERALIZED ANXIETY DISORDER: Status: ACTIVE | Noted: 2020-02-20

## 2022-02-14 PROBLEM — M19.90 ARTHRITIS: Status: ACTIVE | Noted: 2021-06-30

## 2022-02-14 PROBLEM — C64.9 CLEAR CELL CARCINOMA OF KIDNEY (HCC): Status: ACTIVE | Noted: 2022-02-14

## 2022-02-14 PROBLEM — Z97.3 WEARS GLASSES: Status: ACTIVE | Noted: 2021-06-30

## 2022-02-14 LAB
ALBUMIN SERPL-MCNC: 4.2 G/DL (ref 3.5–5.2)
ALP BLD-CCNC: 80 U/L (ref 35–104)
ALT SERPL-CCNC: 16 U/L (ref 0–32)
ANION GAP SERPL CALCULATED.3IONS-SCNC: 10 MMOL/L (ref 7–16)
AST SERPL-CCNC: 21 U/L (ref 0–31)
BASOPHILS ABSOLUTE: 0.04 E9/L (ref 0–0.2)
BASOPHILS RELATIVE PERCENT: 0.8 % (ref 0–2)
BILIRUB SERPL-MCNC: 0.3 MG/DL (ref 0–1.2)
BUN BLDV-MCNC: 16 MG/DL (ref 6–23)
CALCIUM SERPL-MCNC: 9.3 MG/DL (ref 8.6–10.2)
CHLORIDE BLD-SCNC: 102 MMOL/L (ref 98–107)
CO2: 25 MMOL/L (ref 22–29)
CREAT SERPL-MCNC: 1.1 MG/DL (ref 0.5–1)
EOSINOPHILS ABSOLUTE: 0.05 E9/L (ref 0.05–0.5)
EOSINOPHILS RELATIVE PERCENT: 1 % (ref 0–6)
GFR AFRICAN AMERICAN: 60
GFR NON-AFRICAN AMERICAN: 49 ML/MIN/1.73
GLUCOSE BLD-MCNC: 91 MG/DL (ref 74–99)
HCT VFR BLD CALC: 40.6 % (ref 34–48)
HEMOGLOBIN: 13.5 G/DL (ref 11.5–15.5)
IMMATURE GRANULOCYTES #: 0.01 E9/L
IMMATURE GRANULOCYTES %: 0.2 % (ref 0–5)
LYMPHOCYTES ABSOLUTE: 1.05 E9/L (ref 1.5–4)
LYMPHOCYTES RELATIVE PERCENT: 21.2 % (ref 20–42)
MCH RBC QN AUTO: 30.8 PG (ref 26–35)
MCHC RBC AUTO-ENTMCNC: 33.3 % (ref 32–34.5)
MCV RBC AUTO: 92.5 FL (ref 80–99.9)
MONOCYTES ABSOLUTE: 0.35 E9/L (ref 0.1–0.95)
MONOCYTES RELATIVE PERCENT: 7.1 % (ref 2–12)
NEUTROPHILS ABSOLUTE: 3.45 E9/L (ref 1.8–7.3)
NEUTROPHILS RELATIVE PERCENT: 69.7 % (ref 43–80)
PDW BLD-RTO: 13.1 FL (ref 11.5–15)
PLATELET # BLD: 273 E9/L (ref 130–450)
PMV BLD AUTO: 9.6 FL (ref 7–12)
POTASSIUM SERPL-SCNC: 4.2 MMOL/L (ref 3.5–5)
RBC # BLD: 4.39 E12/L (ref 3.5–5.5)
SODIUM BLD-SCNC: 137 MMOL/L (ref 132–146)
TOTAL PROTEIN: 7.4 G/DL (ref 6.4–8.3)
WBC # BLD: 5 E9/L (ref 4.5–11.5)

## 2022-02-14 PROCEDURE — 99214 OFFICE O/P EST MOD 30 MIN: CPT | Performed by: INTERNAL MEDICINE

## 2022-02-14 PROCEDURE — 99213 OFFICE O/P EST LOW 20 MIN: CPT | Performed by: NURSE PRACTITIONER

## 2022-02-14 PROCEDURE — 36415 COLL VENOUS BLD VENIPUNCTURE: CPT

## 2022-02-14 PROCEDURE — 99212 OFFICE O/P EST SF 10 MIN: CPT | Performed by: NURSE PRACTITIONER

## 2022-02-14 PROCEDURE — 99212 OFFICE O/P EST SF 10 MIN: CPT

## 2022-02-14 PROCEDURE — 80053 COMPREHEN METABOLIC PANEL: CPT

## 2022-02-14 PROCEDURE — 85025 COMPLETE CBC W/AUTO DIFF WBC: CPT

## 2022-02-14 PROCEDURE — 77080 DXA BONE DENSITY AXIAL: CPT

## 2022-02-14 NOTE — PROGRESS NOTES
Department of [facility] Oncology   Attending Clinic Note    Reason for Visit: Follow-up on a patient with Left Breast Cancer    PCP:  Ritchie Bee DO    History of Present Illness:  74year old female found to have lesion in upper inner quadrant of left breast    OBSTETRIC RELATED HISTORY:   Age of menarche was 13. Age of menopause was 53. Patient denies hormonal therapy. Patient is . Age of first live birth was 30. Patient did not breast feed. Is patient interested in fertility information about fertility preservation? No     Left breast US 2020:  1.4 cm oval nodule with indistinct and microlobulated margin left breast upper inner aspect middle depth. Suspicious of malignancy follow-up recommended    US guided left breast core biopsy 2020:  Scattered fibroglandular elements in both breasts  New 8 mm irregular density with indistinct margin in left breast at 12 oclock   -Atypical papillary lesion favor intracystic papillary carcinoma  -Atypical lobular hyperplasia with focal minute microcalcification  -Immunostaining for ER is +95% and [place] is +95%  -CK 5/6 is negative and epithelial cells. E-cadherin is positive in the papillary lesion and negative in the atypical lobular hyperplasia. Left breast Needle Localized Lumpectomy on 2020: Invasive lobular and invasive ductal carcinoma with foci of lobular carcinoma in situ, ductal carcinoma in situ, low-grade, cribriform type and encapsulated papillary carcinoma. Margins of excision free of carcinoma    Comment:   The lumpectomy specimen contains a mixed invasive lobular (E-cadherin negative, 1.2 x 0.6 x 0.5 cm) and ductal carcinoma (E-cadherin positive, 1.5 x 1.3 x 1.1 cm).     The invasive lobular component has a Waldo score of 3+1+1 = 5 with adjacent areas of   lobular carcinoma in situ.  The invasive lobular and LCIS come to within 0.5 mm of the inked lateral margin of excision.    The invasive ductal carcinoma has a [place] score of 2+1+1 = 4 and has adjacent areas of   ductal carcinoma in situ, cribriform type, low nuclear grade as well as a 0.5 cm area of encapsulated papillary carcinoma (p63 negative for myoepithelial cells).    The remaining breast tissue shows focal intraductal papillomata as well as focal microcalcifications. Breast Cancer Marker Studies:   Estrogen Receptors (ER): 90%   Progesterone Receptors (DE): 90%   Her-2/lupillo (c-erb B-2) protein expression: Negative (0)      Clinical Stage I left breast cancer. Left axillary sentinel lymph node biopsy on 12/18/2020:  A.  [place] lymph node #1, biopsy: Four (4) reactive nodes, negative for malignancy. B.  [place] lymph node #2, biopsy: One (1) reactive node, negative for malignancy. C.  [place] lymph node #3, biopsy: Two (2) reactive nodes, negative for malignancy. pT1N0 M0 ER+[place]+ HER2 -  Oncotype recurrence score: 0  Distant recurrence risk at 9 years with AI or PEREZ alone- 3%   Absolute chemotherapy benefit RS 0-10 All Ages: <1%  Recommended adjuvant RT followed by adjuvant endocrine therapy (Arimidex 1 mg po daily for 5 years). RT was started on 01/27/2021 and completed 02/17/2021. DEXA scan done at her PCP's office in [facility] 08/30/2020 Normal bone mineral density. Arimidex 1 mg po daily was started on 02/18/2021 with fair tolerance so far. Continues to do well. Review of Systems;  CONSTITUTIONAL: No fever, chills. Fair appetite and energy level  ENMT: Eyes: No diplopia; Nose: No epistaxis. Mouth: No sore throat. RESPIRATORY: No hemoptysis, shortness of breath, cough. GASTROINTESTINAL: No nausea/vomiting, abdominal pain. GENITOURINARY: No dysuria, urinary frequency, hematuria. NEURO: No syncope, presyncope, headache.   Remainder:  ROS NEGATIVE    Past Medical History:      Diagnosis Date    Allergic rhinitis     seasonal    Breast cancer ([facility] [address] 2020    Left    Cancer ([facility] [address]     History of therapeutic radiation     left breast ca 2020    Hypothyroidism     Pericarditis 08/2021     Medications:  Reviewed and reconciled. Allergies:  No Known Allergies    Physical Exam:  BP (!) 165/72 (Site: Right Upper Arm, Position: Sitting, Cuff Size: Medium Adult)   Pulse 56   Temp 97.8 °F (36.6 °C) (Infrared)   Resp 16   Ht 5' 5\" (1.651 m)   Wt 168 lb (76.2 kg)   SpO2 100%   BMI 27.96 kg/m²   GENERAL: Alert, oriented x 3, not in distress  HEENT: PERRLA; EOMI. Oropharynx clear. NECK: Supple. Without lymphadenopathy. LUNGS: Good air entry bilaterally. No wheezing, crackles or ronchi. CARDIOVASCULAR: Regular rate. No murmurs, rubs or gallops. ABDOMEN: Soft. Non-tender, non-distended. Positive bowel sounds. EXTREMITIES: Without clubbing, cyanosis, or edema. NEUROLOGIC: No focal deficits. ECOG PS 1    Lab Results   Component Value Date    WBC 5.0 02/14/2022    HGB 13.5 02/14/2022    HCT 40.6 02/14/2022    MCV 92.5 02/14/2022     02/14/2022     Impression/Plan:  73 y/o female who underwent Left breast Needle Localized Lumpectomy on 12/4/2020: Invasive lobular and invasive ductal carcinoma with foci of lobular carcinoma in situ, ductal carcinoma in situ, low-grade, cribriform type and encapsulated papillary carcinoma. Margins of excision free of carcinoma    Comment:   The lumpectomy specimen contains a mixed invasive lobular (E-cadherin negative, 1.2 x 0.6 x 0.5 cm) and ductal carcinoma (E-cadherin positive, 1.5 x 1.3 x 1.1 cm).    The invasive lobular component has a [place] score of 3+1+1 = 5 with adjacent areas of   lobular carcinoma in situ.  The invasive lobular and LCIS come to within 0.5 mm of the inked lateral margin of excision.    The invasive ductal carcinoma has a Waldo score of 2+1+1 = 4 and has adjacent areas of   ductal carcinoma in situ, cribriform type, low nuclear grade as well as a 0.5 cm area of encapsulated papillary carcinoma (p63 negative for myoepithelial cells).     The remaining breast tissue shows focal intraductal papillomata as well as focal microcalcifications. Breast Cancer Marker Studies:   Estrogen Receptors (ER): 90%   Progesterone Receptors (AK): 90%   Her-2/lupillo (c-erb B-2) protein expression: Negative (0)      Left axillary sentinel lymph node biopsy on 12/18/2020:  A.  [place] lymph node #1, biopsy: Four (4) reactive nodes, negative for malignancy. B.  [place] lymph node #2, biopsy: One (1) reactive node, negative for malignancy. C.  [place] lymph node #3, biopsy: Two (2) reactive nodes, negative for malignancy. pT1N0 M0 ER+ [place] + HER/2 -  Oncotype recurrence score: 0  Distant recurrence risk at 9 years with AI or PEREZ alone- 3%   Absolute chemotherapy benefit RS 0-10 All Ages: <1%    Pathology report and NCCN guidelines reviewed with patient. No indication for adjuvant chemotherapy. Recommended adjuvant RT followed by adjuvant endocrine therapy (Arimidex 1 mg po daily for 5 years). RT was started on 01/27/2021 and completed 02/17/2021. DEXA scan done at her PCP's office in [facility] 08/30/2020 Normal bone mineral density. Arimidex 1 mg po daily was started on 02/18/2021 with fair tolerance so far. Left Breast U/S 07/08/2021 Benign findings with no sonographic evidence of malignancy in the left breast.   Bilateral Screening Mammogram 09/09/2021 Negative for malignancy. DEXA scan 02/14/2022 normal by [facility] criteria  Imaging reviewed labs reviewed. MACK. Continue Arimidex, Ca/VitD.     RTC 4 months with labs     02/14/2022  Ben Meeks MD

## 2022-04-04 DIAGNOSIS — Z85.3 PERSONAL HISTORY OF BREAST CANCER: ICD-10-CM

## 2022-04-04 RX ORDER — ANASTROZOLE 1 MG/1
TABLET ORAL
Qty: 90 TABLET | Refills: 0 | Status: SHIPPED
Start: 2022-04-04 | End: 2022-06-23

## 2022-06-15 ENCOUNTER — OFFICE VISIT (OUTPATIENT)
Dept: ONCOLOGY | Age: 67
End: 2022-06-15
Payer: MEDICARE

## 2022-06-15 ENCOUNTER — HOSPITAL ENCOUNTER (OUTPATIENT)
Dept: INFUSION THERAPY | Age: 67
Discharge: HOME | End: 2022-06-15
Payer: MEDICARE

## 2022-06-15 VITALS
DIASTOLIC BLOOD PRESSURE: 71 MMHG | HEIGHT: 65 IN | TEMPERATURE: 97.3 F | WEIGHT: 170 LBS | HEART RATE: 58 BPM | BODY MASS INDEX: 28.32 KG/M2 | OXYGEN SATURATION: 98 % | SYSTOLIC BLOOD PRESSURE: 169 MMHG

## 2022-06-15 DIAGNOSIS — Z85.3 PERSONAL HISTORY OF BREAST CANCER: Primary | ICD-10-CM

## 2022-06-15 DIAGNOSIS — Z85.3 PERSONAL HISTORY OF BREAST CANCER: ICD-10-CM

## 2022-06-15 LAB
ALBUMIN SERPL-MCNC: 4.5 G/DL (ref 3.5–5.2)
ALP BLD-CCNC: 77 U/L (ref 35–104)
ALT SERPL-CCNC: 16 U/L (ref 0–32)
ANION GAP SERPL CALCULATED.3IONS-SCNC: 8 MMOL/L (ref 7–16)
AST SERPL-CCNC: 22 U/L (ref 0–31)
BASOPHILS ABSOLUTE: 0.04 E9/L (ref 0–0.2)
BASOPHILS RELATIVE PERCENT: 0.9 % (ref 0–2)
BILIRUB SERPL-MCNC: 0.4 MG/DL (ref 0–1.2)
BUN BLDV-MCNC: 16 MG/DL (ref 6–23)
CALCIUM SERPL-MCNC: 10 MG/DL (ref 8.6–10.2)
CHLORIDE BLD-SCNC: 103 MMOL/L (ref 98–107)
CO2: 26 MMOL/L (ref 22–29)
CREAT SERPL-MCNC: 1.3 MG/DL (ref 0.5–1)
EOSINOPHILS ABSOLUTE: 0.03 E9/L (ref 0.05–0.5)
EOSINOPHILS RELATIVE PERCENT: 0.7 % (ref 0–6)
GFR AFRICAN AMERICAN: 49
GFR NON-AFRICAN AMERICAN: 41 ML/MIN/1.73
GLUCOSE BLD-MCNC: 98 MG/DL (ref 74–99)
HCT VFR BLD CALC: 38.9 % (ref 34–48)
HEMOGLOBIN: 12.9 G/DL (ref 11.5–15.5)
IMMATURE GRANULOCYTES #: 0.01 E9/L
IMMATURE GRANULOCYTES %: 0.2 % (ref 0–5)
LYMPHOCYTES ABSOLUTE: 0.93 E9/L (ref 1.5–4)
LYMPHOCYTES RELATIVE PERCENT: 21.6 % (ref 20–42)
MCH RBC QN AUTO: 30.6 PG (ref 26–35)
MCHC RBC AUTO-ENTMCNC: 33.2 % (ref 32–34.5)
MCV RBC AUTO: 92.4 FL (ref 80–99.9)
MONOCYTES ABSOLUTE: 0.35 E9/L (ref 0.1–0.95)
MONOCYTES RELATIVE PERCENT: 8.1 % (ref 2–12)
NEUTROPHILS ABSOLUTE: 2.95 E9/L (ref 1.8–7.3)
NEUTROPHILS RELATIVE PERCENT: 68.5 % (ref 43–80)
PDW BLD-RTO: 12.9 FL (ref 11.5–15)
PLATELET # BLD: 270 E9/L (ref 130–450)
PMV BLD AUTO: 9.8 FL (ref 7–12)
POTASSIUM SERPL-SCNC: 4.4 MMOL/L (ref 3.5–5)
RBC # BLD: 4.21 E12/L (ref 3.5–5.5)
SODIUM BLD-SCNC: 137 MMOL/L (ref 132–146)
TOTAL PROTEIN: 7.2 G/DL (ref 6.4–8.3)
WBC # BLD: 4.3 E9/L (ref 4.5–11.5)

## 2022-06-15 PROCEDURE — 80053 COMPREHEN METABOLIC PANEL: CPT

## 2022-06-15 PROCEDURE — 99212 OFFICE O/P EST SF 10 MIN: CPT

## 2022-06-15 PROCEDURE — 36415 COLL VENOUS BLD VENIPUNCTURE: CPT

## 2022-06-15 PROCEDURE — 1123F ACP DISCUSS/DSCN MKR DOCD: CPT | Performed by: INTERNAL MEDICINE

## 2022-06-15 PROCEDURE — 99214 OFFICE O/P EST MOD 30 MIN: CPT | Performed by: INTERNAL MEDICINE

## 2022-06-15 PROCEDURE — 85025 COMPLETE CBC W/AUTO DIFF WBC: CPT

## 2022-06-15 NOTE — PROGRESS NOTES
Department of [facility] Oncology   Attending Clinic Note    Reason for Visit: Follow-up on a patient with Left Breast Cancer    PCP:  Laureen Ryder DO    History of Present Illness:  74year old female found to have lesion in upper inner quadrant of left breast    OBSTETRIC RELATED HISTORY:   Age of menarche was 13. Age of menopause was 53. Patient denies hormonal therapy. Patient is . Age of first live birth was 30. Patient did not breast feed. Is patient interested in fertility information about fertility preservation? No     Left breast US 2020:  1.4 cm oval nodule with indistinct and microlobulated margin left breast upper inner aspect middle depth. Suspicious of malignancy follow-up recommended    US guided left breast core biopsy 2020:  Scattered fibroglandular elements in both breasts  New 8 mm irregular density with indistinct margin in left breast at 12 oclock   -Atypical papillary lesion favor intracystic papillary carcinoma  -Atypical lobular hyperplasia with focal minute microcalcification  -Immunostaining for ER is +95% and VA is +95%  -CK 5/6 is negative and epithelial cells. E-cadherin is positive in the papillary lesion and negative in the atypical lobular hyperplasia. Left breast Needle Localized Lumpectomy on 2020: Invasive lobular and invasive ductal carcinoma with foci of lobular carcinoma in situ, ductal carcinoma in situ, low-grade, cribriform type and encapsulated papillary carcinoma. Margins of excision free of carcinoma    Comment:   The lumpectomy specimen contains a mixed invasive lobular (E-cadherin negative, 1.2 x 0.6 x 0.5 cm) and ductal carcinoma (E-cadherin positive, 1.5 x 1.3 x 1.1 cm).     The invasive lobular component has a [place] score of 3+1+1 = 5 with adjacent areas of   lobular carcinoma in situ.  The invasive lobular and LCIS come to within 0.5 mm of the inked lateral margin of excision.    The invasive ductal carcinoma has a [place] score of 2+1+1 = 4 and has adjacent areas of   ductal carcinoma in situ, cribriform type, low nuclear grade as well as a 0.5 cm area of encapsulated papillary carcinoma (p63 negative for myoepithelial cells).    The remaining breast tissue shows focal intraductal papillomata as well as focal microcalcifications. Breast Cancer Marker Studies:   Estrogen Receptors (ER): 90%   Progesterone Receptors (NC): 90%   Her-2/lupillo (c-erb B-2) protein expression: Negative (0)      Clinical Stage I left breast cancer. Left axillary sentinel lymph node biopsy on 12/18/2020:  A.  [place] lymph node #1, biopsy: Four (4) reactive nodes, negative for malignancy. B.  [place] lymph node #2, biopsy: One (1) reactive node, negative for malignancy. C.  [place] lymph node #3, biopsy: Two (2) reactive nodes, negative for malignancy. pT1N0 M0 ER+NC+ HER2 -  Oncotype recurrence score: 0  Distant recurrence risk at 9 years with AI or PEREZ alone- 3%   Absolute chemotherapy benefit RS 0-10 All Ages: <1%  Recommended adjuvant RT followed by adjuvant endocrine therapy (Arimidex 1 mg po daily for 5 years). RT was started on 01/27/2021 and completed 02/17/2021. DEXA scan done at her PCP's office in [facility] 08/30/2020 Normal bone mineral density. Arimidex 1 mg po daily was started on 02/18/2021    Today 06/15/2022. Continues to tolerate Arimidex well. No fever, chills. Fair appetite and energy level. Review of Systems;  CONSTITUTIONAL: No fever, chills. Fair appetite and energy level  ENMT: Eyes: No diplopia; Nose: No epistaxis. Mouth: No sore throat. RESPIRATORY: No hemoptysis, shortness of breath, cough. GASTROINTESTINAL: No nausea/vomiting, abdominal pain. GENITOURINARY: No dysuria, urinary frequency, hematuria. NEURO: No syncope, presyncope, headache.   Remainder:  ROS NEGATIVE    Past Medical History:      Diagnosis Date    Allergic rhinitis     seasonal    Breast cancer ([facility] [address] 2020    Left    [facility])     History of therapeutic radiation     left breast ca 2020    Hypothyroidism     Pericarditis 08/2021     Medications:  Reviewed and reconciled. Allergies:  No Known Allergies    Physical Exam:  BP (!) 169/71   Pulse 58   Temp 97.3 °F (36.3 °C)   Ht 5' 5\" (1.651 m)   Wt 170 lb (77.1 kg)   SpO2 98%   BMI 28.29 kg/m²   GENERAL: Alert, oriented x 3, not in distress  HEENT: PERRLA; EOMI. Oropharynx clear. LUNGS: Good air entry bilaterally. No wheezing, crackles or ronchi. CARDIOVASCULAR: Regular rate. No murmurs, rubs or gallops. EXTREMITIES: Without clubbing, cyanosis, or edema. NEUROLOGIC: No focal deficits. ECOG PS 1    Lab Results   Component Value Date    WBC 4.3 (L) 06/15/2022    HGB 12.9 06/15/2022    HCT 38.9 06/15/2022    MCV 92.4 06/15/2022     06/15/2022     Lab Results   Component Value Date     06/15/2022    K 4.4 06/15/2022     06/15/2022    CO2 26 06/15/2022    BUN 16 06/15/2022    CREATININE 1.3 (H) 06/15/2022    GLUCOSE 98 06/15/2022    CALCIUM 10.0 06/15/2022    PROT 7.2 06/15/2022    LABALBU 4.5 06/15/2022    BILITOT 0.4 06/15/2022    ALKPHOS 77 06/15/2022    AST 22 06/15/2022    ALT 16 06/15/2022    LABGLOM 41 06/15/2022    GFRAA 49 06/15/2022     Impression/Plan:  73 y/o female who underwent Left breast Needle Localized Lumpectomy on 12/4/2020: Invasive lobular and invasive ductal carcinoma with foci of lobular carcinoma in situ, ductal carcinoma in situ, low-grade, cribriform type and encapsulated papillary carcinoma. Margins of excision free of carcinoma    Comment:   The lumpectomy specimen contains a mixed invasive lobular (E-cadherin negative, 1.2 x 0.6 x 0.5 cm) and ductal carcinoma (E-cadherin positive, 1.5 x 1.3 x 1.1 cm).    The invasive lobular component has a [place] score of 3+1+1 = 5 with adjacent areas of   lobular carcinoma in situ.  The invasive lobular and LCIS come to within 0.5 mm of the inked lateral margin of excision.    The invasive ductal carcinoma has a [place] score of 2+1+1 = 4 and has adjacent areas of   ductal carcinoma in situ, cribriform type, low nuclear grade as well as a 0.5 cm area of encapsulated papillary carcinoma (p63 negative for myoepithelial cells).    The remaining breast tissue shows focal intraductal papillomata as well as focal microcalcifications. Breast Cancer Marker Studies:   Estrogen Receptors (ER): 90%   Progesterone Receptors (OR): 90%   Her-2/lupillo (c-erb B-2) protein expression: Negative (0)      Left axillary sentinel lymph node biopsy on 12/18/2020:  A.  [place] lymph node #1, biopsy: Four (4) reactive nodes, negative for malignancy. B.  [place] lymph node #2, biopsy: One (1) reactive node, negative for malignancy. C.  [place] lymph node #3, biopsy: Two (2) reactive nodes, negative for malignancy. pT1N0 M0 ER+ OR + HER/2 -  Oncotype recurrence score: 0  Distant recurrence risk at 9 years with AI or PEREZ alone- 3%   Absolute chemotherapy benefit RS 0-10 All Ages: <1%    Pathology report and NCCN guidelines reviewed with patient. No indication for adjuvant chemotherapy. Recommended adjuvant RT followed by adjuvant endocrine therapy (Arimidex 1 mg po daily for 5 years). RT was started on 01/27/2021 and completed 02/17/2021. DEXA scan at her PCP's office in \[facility]\"" 08/30/2020 Normal bone mineral density. Arimidex 1 mg po daily was started on 02/18/2021 with fair tolerance so far. Left Breast U/S 07/08/2021 Benign findings with no sonographic evidence of malignancy in the left breast.   Bilateral Screening Mammogram 09/09/2021 Negative for malignancy. DEXA scan 02/14/2022 normal by [facility] criteria  Imaging reviewed. Labs reviewed. Increase po fluid intake  MACK  Continue Arimidex, Ca/VitD. RTC 4 months with labs.  Mammogram Sept 2022    06/15/2022  Gaylin Essex, MD

## 2022-06-23 DIAGNOSIS — Z85.3 PERSONAL HISTORY OF BREAST CANCER: ICD-10-CM

## 2022-06-23 RX ORDER — ANASTROZOLE 1 MG/1
TABLET ORAL
Qty: 90 TABLET | Refills: 0 | Status: SHIPPED | OUTPATIENT
Start: 2022-06-23 | End: 2022-09-22 | Stop reason: SDUPTHER

## 2022-09-22 DIAGNOSIS — Z85.3 PERSONAL HISTORY OF BREAST CANCER: ICD-10-CM

## 2022-09-22 RX ORDER — ANASTROZOLE 1 MG/1
TABLET ORAL
Qty: 90 TABLET | Refills: 0 | Status: SHIPPED
Start: 2022-09-22 | End: 2022-09-24 | Stop reason: SDUPTHER

## 2022-09-24 RX ORDER — ANASTROZOLE 1 MG/1
TABLET ORAL
Qty: 90 TABLET | Refills: 0 | Status: SHIPPED
Start: 2022-09-24 | End: 2022-10-04 | Stop reason: SDUPTHER

## 2022-10-04 DIAGNOSIS — Z85.3 PERSONAL HISTORY OF BREAST CANCER: ICD-10-CM

## 2022-10-04 RX ORDER — ANASTROZOLE 1 MG/1
TABLET ORAL
Qty: 90 TABLET | Refills: 0 | Status: SHIPPED | OUTPATIENT
Start: 2022-10-04

## 2022-10-12 ENCOUNTER — HOSPITAL ENCOUNTER (OUTPATIENT)
Dept: INFUSION THERAPY | Age: 67
Discharge: HOME | End: 2022-10-12
Payer: MEDICARE

## 2022-10-12 ENCOUNTER — OFFICE VISIT (OUTPATIENT)
Dept: ONCOLOGY | Age: 67
End: 2022-10-12
Payer: MEDICARE

## 2022-10-12 VITALS
TEMPERATURE: 97.6 F | RESPIRATION RATE: 16 BRPM | OXYGEN SATURATION: 97 % | BODY MASS INDEX: 27.82 KG/M2 | DIASTOLIC BLOOD PRESSURE: 64 MMHG | HEART RATE: 54 BPM | SYSTOLIC BLOOD PRESSURE: 139 MMHG | WEIGHT: 167 LBS | HEIGHT: 65 IN

## 2022-10-12 DIAGNOSIS — Z85.3 PERSONAL HISTORY OF BREAST CANCER: Primary | ICD-10-CM

## 2022-10-12 DIAGNOSIS — C50.912 MALIGNANT NEOPLASM OF LEFT BREAST IN FEMALE, ESTROGEN RECEPTOR POSITIVE, UNSPECIFIED SITE OF BREAST (HCC): Primary | ICD-10-CM

## 2022-10-12 DIAGNOSIS — Z17.0 MALIGNANT NEOPLASM OF LEFT BREAST IN FEMALE, ESTROGEN RECEPTOR POSITIVE, UNSPECIFIED SITE OF BREAST (HCC): Primary | ICD-10-CM

## 2022-10-12 LAB
ALBUMIN SERPL-MCNC: 4.5 G/DL (ref 3.5–5.2)
ALP BLD-CCNC: 76 U/L (ref 35–104)
ALT SERPL-CCNC: 14 U/L (ref 0–32)
ANION GAP SERPL CALCULATED.3IONS-SCNC: 9 MMOL/L (ref 7–16)
AST SERPL-CCNC: 18 U/L (ref 0–31)
BASOPHILS ABSOLUTE: 0.03 E9/L (ref 0–0.2)
BASOPHILS RELATIVE PERCENT: 0.5 % (ref 0–2)
BILIRUB SERPL-MCNC: 0.3 MG/DL (ref 0–1.2)
BUN BLDV-MCNC: 15 MG/DL (ref 6–23)
CALCIUM SERPL-MCNC: 10 MG/DL (ref 8.6–10.2)
CHLORIDE BLD-SCNC: 101 MMOL/L (ref 98–107)
CO2: 26 MMOL/L (ref 22–29)
CREAT SERPL-MCNC: 1.1 MG/DL (ref 0.5–1)
EOSINOPHILS ABSOLUTE: 0.02 E9/L (ref 0.05–0.5)
EOSINOPHILS RELATIVE PERCENT: 0.4 % (ref 0–6)
GFR AFRICAN AMERICAN: 60
GFR NON-AFRICAN AMERICAN: 49 ML/MIN/1.73
GLUCOSE BLD-MCNC: 92 MG/DL (ref 74–99)
HCT VFR BLD CALC: 39.8 % (ref 34–48)
HEMOGLOBIN: 13.5 G/DL (ref 11.5–15.5)
IMMATURE GRANULOCYTES #: 0.02 E9/L
IMMATURE GRANULOCYTES %: 0.4 % (ref 0–5)
LYMPHOCYTES ABSOLUTE: 0.99 E9/L (ref 1.5–4)
LYMPHOCYTES RELATIVE PERCENT: 17.5 % (ref 20–42)
MCH RBC QN AUTO: 31.7 PG (ref 26–35)
MCHC RBC AUTO-ENTMCNC: 33.9 % (ref 32–34.5)
MCV RBC AUTO: 93.4 FL (ref 80–99.9)
MONOCYTES ABSOLUTE: 0.36 E9/L (ref 0.1–0.95)
MONOCYTES RELATIVE PERCENT: 6.3 % (ref 2–12)
NEUTROPHILS ABSOLUTE: 4.25 E9/L (ref 1.8–7.3)
NEUTROPHILS RELATIVE PERCENT: 74.9 % (ref 43–80)
PDW BLD-RTO: 12.5 FL (ref 11.5–15)
PLATELET # BLD: 274 E9/L (ref 130–450)
PMV BLD AUTO: 9.5 FL (ref 7–12)
POTASSIUM SERPL-SCNC: 4.4 MMOL/L (ref 3.5–5)
RBC # BLD: 4.26 E12/L (ref 3.5–5.5)
SODIUM BLD-SCNC: 136 MMOL/L (ref 132–146)
TOTAL PROTEIN: 7.4 G/DL (ref 6.4–8.3)
WBC # BLD: 5.7 E9/L (ref 4.5–11.5)

## 2022-10-12 PROCEDURE — 1123F ACP DISCUSS/DSCN MKR DOCD: CPT | Performed by: INTERNAL MEDICINE

## 2022-10-12 PROCEDURE — 99214 OFFICE O/P EST MOD 30 MIN: CPT | Performed by: INTERNAL MEDICINE

## 2022-10-12 PROCEDURE — 36415 COLL VENOUS BLD VENIPUNCTURE: CPT

## 2022-10-12 PROCEDURE — 99212 OFFICE O/P EST SF 10 MIN: CPT

## 2022-10-12 NOTE — PROGRESS NOTES
Department of [facility] Oncology   Attending Clinic Note    Reason for Visit: Follow-up on a patient with Left Breast Cancer    PCP:  Carlos Obregon DO    History of Present Illness:  69year old female found to have lesion in upper inner quadrant of left breast    OBSTETRIC RELATED HISTORY:   Age of menarche was 13. Age of menopause was 53. Patient denies hormonal therapy. Patient is . Age of first live birth was 30. Patient did not breast feed. Is patient interested in fertility information about fertility preservation? No     Left breast US 2020:  1.4 cm oval nodule with indistinct and microlobulated margin left breast upper inner aspect middle depth. 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 E-cadherin is positive in the papillary lesion and negative in the atypical lobular hyperplasia. Left breast Needle Localized Lumpectomy on 2020: Invasive lobular and invasive ductal carcinoma with foci of lobular carcinoma in situ, ductal carcinoma in situ, low-grade, cribriform type and encapsulated papillary carcinoma. Margins of excision free of carcinoma    Comment:   The lumpectomy specimen contains a mixed invasive lobular (E-cadherin negative, 1.2 x 0.6 x 0.5 cm) and ductal carcinoma (E-cadherin positive, 1.5 x 1.3 x 1.1 cm). The invasive lobular component has a [place] score of 3+1+1 = 5 with adjacent areas of   lobular carcinoma in situ. The invasive lobular and LCIS come to within 0.5 mm of the inked lateral margin of excision.     The invasive ductal carcinoma has a [place] score of 2+1+1 = 4 and has adjacent areas of   ductal carcinoma in situ, cribriform type, low nuclear grade as well as a 0.5 cm area of encapsulated papillary carcinoma (p63 negative for myoepithelial cells). The remaining breast tissue shows focal intraductal papillomata as well as focal microcalcifications. Breast Cancer Marker Studies:   Estrogen Receptors (ER): 90%   Progesterone Receptors (ND): 90%   Her-2/lupillo (c-erb B-2) protein expression: Negative (0)      Clinical Stage I left breast cancer. Left axillary sentinel lymph node biopsy on 12/18/2020:  A. [place] lymph node #1, biopsy: Four (4) reactive nodes, negative for malignancy. B. [place] lymph node #2, biopsy: One (1) reactive node, negative for malignancy. C.  [place] lymph node #3, biopsy: Two (2) reactive nodes, negative for malignancy. pT1N0 M0 ER+ND+ HER2 -  Oncotype recurrence score: 0  Distant recurrence risk at 9 years with AI or PEREZ alone- 3%   Absolute chemotherapy benefit RS 0-10 All Ages: <1%  Recommended adjuvant RT followed by adjuvant endocrine therapy (Arimidex 1 mg po daily for 5 years). RT was started on 01/27/2021 and completed 02/17/2021. DEXA scan done at her PCP's office in [address] 08/30/2020 Normal bone mineral density. Arimidex 1 mg po daily was started on 02/18/2021    Today 10/12/2022; No fever, chills. Fair appetite and energy level. She continues to tolerate Arimidex well. Review of Systems;  CONSTITUTIONAL: No fever, chills. Fair appetite and energy level  ENMT: Eyes: No diplopia; Nose: No epistaxis. Mouth: No sore throat. RESPIRATORY: No hemoptysis, shortness of breath, cough. GASTROINTESTINAL: No nausea/vomiting, abdominal pain. GENITOURINARY: No dysuria, urinary frequency, hematuria. NEURO: No syncope, presyncope, headache.   Remainder:ROS NEGATIVE    Past Medical History:      Diagnosis Date    Allergic rhinitis     seasonal    Breast cancer ([facility] [address] 2020    Left Cancer [facility])     History of therapeutic radiation     left breast ca 2020    Hypothyroidism     Pericarditis 08/2021     Medications:  Reviewed and reconciled. Allergies:  No Known Allergies    Physical Exam:  /64   Pulse 54   Temp 97.6 °F (36.4 °C) (Infrared)   Resp 16   Ht 5' 5\" (1.651 m)   Wt 167 lb (75.8 kg)   SpO2 97%   BMI 27.79 kg/m²   GENERAL: Alert, oriented x 3, not in distress  HEENT: PERRLA; EOMI. Oropharynx clear. LUNGS: Good air entry bilaterally. No wheezing, crackles or ronchi. CARDIOVASCULAR: Regular rate. No murmurs, rubs or gallops. EXTREMITIES: Without clubbing, cyanosis, or edema. NEUROLOGIC: No focal deficits. ECOG PS 1    Lab Results   Component Value Date    WBC 5.7 10/12/2022    HGB 13.5 10/12/2022    HCT 39.8 10/12/2022    MCV 93.4 10/12/2022     10/12/2022     Lab Results   Component Value Date     10/12/2022    K 4.4 10/12/2022     10/12/2022    CO2 26 10/12/2022    BUN 15 10/12/2022    CREATININE 1.1 (H) 10/12/2022    GLUCOSE 92 10/12/2022    CALCIUM 10.0 10/12/2022    PROT 7.4 10/12/2022    LABALBU 4.5 10/12/2022    BILITOT 0.3 10/12/2022    ALKPHOS 76 10/12/2022    AST 18 10/12/2022    ALT 14 10/12/2022    LABGLOM 49 10/12/2022    GFRAA 60 10/12/2022     Impression/Plan:  70 y/o female who underwent Left breast Needle Localized Lumpectomy on 12/4/2020: Invasive lobular and invasive ductal carcinoma with foci of lobular carcinoma in situ, ductal carcinoma in situ, low-grade, cribriform type and encapsulated papillary carcinoma. Margins of excision free of carcinoma    Comment:   The lumpectomy specimen contains a mixed invasive lobular (E-cadherin negative, 1.2 x 0.6 x 0.5 cm) and ductal carcinoma (E-cadherin positive, 1.5 x 1.3 x 1.1 cm). The invasive lobular component has a [place] score of 3+1+1 = 5 with adjacent areas of   lobular carcinoma in situ.   The invasive lobular and LCIS come to within 0.5 mm of the inked lateral margin of excision. The invasive ductal carcinoma has a [place] score of 2+1+1 = 4 and has adjacent areas of   ductal carcinoma in situ, cribriform type, low nuclear grade as well as a 0.5 cm area of encapsulated papillary carcinoma (p63 negative for myoepithelial cells). The remaining breast tissue shows focal intraductal papillomata as well as focal microcalcifications. Breast Cancer Marker Studies:   Estrogen Receptors (ER): 90%   Progesterone Receptors (FL): 90%   Her-2/lupillo (c-erb B-2) protein expression: Negative (0)      Left axillary sentinel lymph node biopsy on 12/18/2020:  A. [place] lymph node #1, biopsy: Four (4) reactive nodes, negative for malignancy. B. [place] lymph node #2, biopsy: One (1) reactive node, negative for malignancy. C.  [place] lymph node #3, biopsy: Two (2) reactive nodes, negative for malignancy. pT1N0 M0 ER+ [place] + HER/2 -  Oncotype recurrence score: 0  Distant recurrence risk at 9 years with AI or PEREZ alone- 3%   Absolute chemotherapy benefit RS 0-10 All Ages: <1%    No indication for adjuvant chemotherapy. Recommended adjuvant RT followed by adjuvant endocrine therapy (Arimidex 1 mg po daily for 5 years). RT was started on 01/27/2021 and completed 02/17/2021. DEXA scan at her PCP's office in [facility] 08/30/2020 Normal bone mineral density. Arimidex 1 mg po daily was started on 02/18/2021 with fair tolerance so far. Left Breast U/S 07/08/2021 Benign findings with no sonographic evidence of malignancy in the left breast.     Bilateral Screening Mammogram 09/09/2021 Negative for malignancy. DEXA scan 02/14/2022 normal by [facility] criteria  Imaging reviewed. Labs reviewed. Increase po fluid intake. MACK. Mammogram 10/24/2022    Continue Arimidex, Ca. VitD    RTC 6 months with labs     10/12/2022  Austin Alvares MD

## 2022-12-20 DIAGNOSIS — Z85.3 PERSONAL HISTORY OF BREAST CANCER: ICD-10-CM

## 2022-12-20 RX ORDER — ANASTROZOLE 1 MG/1
TABLET ORAL
Qty: 90 TABLET | Refills: 0 | Status: CANCELLED | OUTPATIENT
Start: 2022-12-20

## 2022-12-29 DIAGNOSIS — Z85.3 PERSONAL HISTORY OF BREAST CANCER: ICD-10-CM

## 2023-01-03 DIAGNOSIS — Z85.3 PERSONAL HISTORY OF BREAST CANCER: ICD-10-CM

## 2023-01-03 RX ORDER — ANASTROZOLE 1 MG/1
TABLET ORAL
Qty: 90 TABLET | Refills: 0 | Status: SHIPPED
Start: 2023-01-03 | End: 2023-01-03 | Stop reason: SDUPTHER

## 2023-01-03 RX ORDER — ANASTROZOLE 1 MG/1
TABLET ORAL
Qty: 90 TABLET | Refills: 0 | Status: SHIPPED | OUTPATIENT
Start: 2023-01-03

## 2023-03-21 DIAGNOSIS — Z85.3 PERSONAL HISTORY OF BREAST CANCER: ICD-10-CM

## 2023-03-21 RX ORDER — ANASTROZOLE 1 MG/1
TABLET ORAL
Qty: 90 TABLET | Refills: 0 | Status: SHIPPED | OUTPATIENT
Start: 2023-03-21

## 2023-05-03 ENCOUNTER — OFFICE VISIT (OUTPATIENT)
Dept: ONCOLOGY | Age: 68
End: 2023-05-03
Payer: MEDICARE

## 2023-05-03 ENCOUNTER — HOSPITAL ENCOUNTER (OUTPATIENT)
Dept: INFUSION THERAPY | Age: 68
Discharge: HOME | End: 2023-05-03
Payer: MEDICARE

## 2023-05-03 VITALS
HEART RATE: 52 BPM | DIASTOLIC BLOOD PRESSURE: 68 MMHG | SYSTOLIC BLOOD PRESSURE: 149 MMHG | HEIGHT: 65 IN | TEMPERATURE: 97.3 F | OXYGEN SATURATION: 99 % | WEIGHT: 173.9 LBS | BODY MASS INDEX: 28.97 KG/M2

## 2023-05-03 DIAGNOSIS — Z12.31 SCREENING MAMMOGRAM FOR HIGH-RISK PATIENT: Primary | ICD-10-CM

## 2023-05-03 DIAGNOSIS — Z85.3 PERSONAL HISTORY OF BREAST CANCER: ICD-10-CM

## 2023-05-03 LAB
ALBUMIN SERPL-MCNC: 4.3 G/DL (ref 3.5–5.2)
ALP SERPL-CCNC: 78 U/L (ref 35–104)
ALT SERPL-CCNC: 15 U/L (ref 0–32)
ANION GAP SERPL CALCULATED.3IONS-SCNC: 10 MMOL/L (ref 7–16)
AST SERPL-CCNC: 19 U/L (ref 0–31)
BASOPHILS # BLD: 0.03 E9/L (ref 0–0.2)
BASOPHILS NFR BLD: 0.6 % (ref 0–2)
BILIRUB SERPL-MCNC: 0.4 MG/DL (ref 0–1.2)
BUN SERPL-MCNC: 23 MG/DL (ref 6–23)
CALCIUM SERPL-MCNC: 9.5 MG/DL (ref 8.6–10.2)
CHLORIDE SERPL-SCNC: 103 MMOL/L (ref 98–107)
CO2 SERPL-SCNC: 25 MMOL/L (ref 22–29)
CREAT SERPL-MCNC: 1.2 MG/DL (ref 0.5–1)
EOSINOPHIL # BLD: 0.06 E9/L (ref 0.05–0.5)
EOSINOPHIL NFR BLD: 1.2 % (ref 0–6)
ERYTHROCYTE [DISTWIDTH] IN BLOOD BY AUTOMATED COUNT: 12.3 FL (ref 11.5–15)
GLUCOSE SERPL-MCNC: 88 MG/DL (ref 74–99)
HCT VFR BLD AUTO: 39.5 % (ref 34–48)
HGB BLD-MCNC: 13.1 G/DL (ref 11.5–15.5)
IMM GRANULOCYTES # BLD: 0.01 E9/L
IMM GRANULOCYTES NFR BLD: 0.2 % (ref 0–5)
LYMPHOCYTES # BLD: 1.14 E9/L (ref 1.5–4)
LYMPHOCYTES NFR BLD: 22 % (ref 20–42)
MCH RBC QN AUTO: 31.8 PG (ref 26–35)
MCHC RBC AUTO-ENTMCNC: 33.2 % (ref 32–34.5)
MCV RBC AUTO: 95.9 FL (ref 80–99.9)
MONOCYTES # BLD: 0.39 E9/L (ref 0.1–0.95)
MONOCYTES NFR BLD: 7.5 % (ref 2–12)
NEUTROPHILS # BLD: 3.55 E9/L (ref 1.8–7.3)
NEUTS SEG NFR BLD: 68.5 % (ref 43–80)
PLATELET # BLD AUTO: 255 E9/L (ref 130–450)
PMV BLD AUTO: 9.8 FL (ref 7–12)
POTASSIUM SERPL-SCNC: 4.8 MMOL/L (ref 3.5–5)
PROT SERPL-MCNC: 7.2 G/DL (ref 6.4–8.3)
RBC # BLD AUTO: 4.12 E12/L (ref 3.5–5.5)
SODIUM SERPL-SCNC: 138 MMOL/L (ref 132–146)
WBC # BLD: 5.2 E9/L (ref 4.5–11.5)

## 2023-05-03 PROCEDURE — 80053 COMPREHEN METABOLIC PANEL: CPT

## 2023-05-03 PROCEDURE — 36415 COLL VENOUS BLD VENIPUNCTURE: CPT

## 2023-05-03 PROCEDURE — 85025 COMPLETE CBC W/AUTO DIFF WBC: CPT

## 2023-05-03 PROCEDURE — 1123F ACP DISCUSS/DSCN MKR DOCD: CPT | Performed by: INTERNAL MEDICINE

## 2023-05-03 PROCEDURE — 99214 OFFICE O/P EST MOD 30 MIN: CPT | Performed by: INTERNAL MEDICINE

## 2023-05-03 PROCEDURE — 99213 OFFICE O/P EST LOW 20 MIN: CPT

## 2023-05-03 NOTE — PROGRESS NOTES
Department of [facility] Oncology   Attending Clinic Note    Reason for Visit: Follow-up on a patient with Left Breast Cancer    PCP:  Rene Amador DO    History of Present Illness:  69year old female found to have lesion in upper inner quadrant of left breast    OBSTETRIC RELATED HISTORY:   Age of menarche was 13. Age of menopause was 53. Patient denies hormonal therapy. Patient is . Age of first live birth was 30. Patient did not breast feed. Is patient interested in fertility information about fertility preservation? No     Left breast US 2020:  1.4 cm oval nodule with indistinct and microlobulated margin left breast upper inner aspect middle depth. 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 E-cadherin is positive in the papillary lesion and negative in the atypical lobular hyperplasia. Left breast Needle Localized Lumpectomy on 2020: Invasive lobular and invasive ductal carcinoma with foci of lobular carcinoma in situ, ductal carcinoma in situ, low-grade, cribriform type and encapsulated papillary carcinoma. Margins of excision free of carcinoma    Comment:   The lumpectomy specimen contains a mixed invasive lobular (E-cadherin negative, 1.2 x 0.6 x 0.5 cm) and ductal carcinoma (E-cadherin positive, 1.5 x 1.3 x 1.1 cm). The invasive lobular component has a [place] score of 3+1+1 = 5 with adjacent areas of   lobular carcinoma in situ. The invasive lobular and LCIS come to within 0.5 mm of the inked lateral margin of excision.     The invasive ductal carcinoma has a

## 2023-06-19 DIAGNOSIS — Z85.3 PERSONAL HISTORY OF BREAST CANCER: ICD-10-CM

## 2023-06-19 RX ORDER — ANASTROZOLE 1 MG/1
1 TABLET ORAL DAILY
Qty: 90 TABLET | Refills: 0 | Status: SHIPPED | OUTPATIENT
Start: 2023-06-19

## 2023-09-15 DIAGNOSIS — Z85.3 PERSONAL HISTORY OF BREAST CANCER: ICD-10-CM

## 2023-09-15 RX ORDER — ANASTROZOLE 1 MG/1
1 TABLET ORAL DAILY
Qty: 90 TABLET | Refills: 0 | Status: SHIPPED | OUTPATIENT
Start: 2023-09-15

## 2023-10-16 DIAGNOSIS — Z85.3 PERSONAL HISTORY OF BREAST CANCER: Primary | ICD-10-CM

## 2023-10-25 ENCOUNTER — HOSPITAL ENCOUNTER (OUTPATIENT)
Dept: GENERAL RADIOLOGY | Age: 68
Discharge: HOME | End: 2023-10-27
Payer: MEDICARE

## 2023-10-25 ENCOUNTER — OFFICE VISIT (OUTPATIENT)
Dept: ONCOLOGY | Age: 68
End: 2023-10-25
Payer: MEDICARE

## 2023-10-25 ENCOUNTER — HOSPITAL ENCOUNTER (OUTPATIENT)
Dept: INFUSION THERAPY | Age: 68
Discharge: HOME | End: 2023-10-25
Payer: MEDICARE

## 2023-10-25 VITALS
TEMPERATURE: 97.2 F | HEIGHT: 65 IN | WEIGHT: 171.6 LBS | BODY MASS INDEX: 28.59 KG/M2 | SYSTOLIC BLOOD PRESSURE: 136 MMHG | DIASTOLIC BLOOD PRESSURE: 75 MMHG | HEART RATE: 53 BPM | OXYGEN SATURATION: 100 %

## 2023-10-25 VITALS — WEIGHT: 170 LBS | HEIGHT: 65 IN | BODY MASS INDEX: 28.32 KG/M2

## 2023-10-25 DIAGNOSIS — Z12.31 SCREENING MAMMOGRAM FOR HIGH-RISK PATIENT: ICD-10-CM

## 2023-10-25 DIAGNOSIS — Z12.31 SCREENING MAMMOGRAM FOR HIGH-RISK PATIENT: Primary | ICD-10-CM

## 2023-10-25 DIAGNOSIS — Z85.3 PERSONAL HISTORY OF BREAST CANCER: ICD-10-CM

## 2023-10-25 LAB
ALBUMIN SERPL-MCNC: 4.5 G/DL (ref 3.5–5.2)
ALP SERPL-CCNC: 74 U/L (ref 35–104)
ALT SERPL-CCNC: 18 U/L (ref 0–32)
ANION GAP SERPL CALCULATED.3IONS-SCNC: 9 MMOL/L (ref 7–16)
AST SERPL-CCNC: 22 U/L (ref 0–31)
BASOPHILS # BLD: 0.03 K/UL (ref 0–0.2)
BASOPHILS NFR BLD: 1 % (ref 0–2)
BILIRUB SERPL-MCNC: 0.3 MG/DL (ref 0–1.2)
BUN SERPL-MCNC: 17 MG/DL (ref 6–23)
CALCIUM SERPL-MCNC: 10.1 MG/DL (ref 8.6–10.2)
CHLORIDE SERPL-SCNC: 102 MMOL/L (ref 98–107)
CO2 SERPL-SCNC: 27 MMOL/L (ref 22–29)
CREAT SERPL-MCNC: 1.2 MG/DL (ref 0.5–1)
EOSINOPHIL # BLD: 0.04 K/UL (ref 0.05–0.5)
EOSINOPHILS RELATIVE PERCENT: 1 % (ref 0–6)
ERYTHROCYTE [DISTWIDTH] IN BLOOD BY AUTOMATED COUNT: 11.9 % (ref 11.5–15)
GFR SERPL CREATININE-BSD FRML MDRD: 48 ML/MIN/1.73M2
GLUCOSE SERPL-MCNC: 88 MG/DL (ref 74–99)
HCT VFR BLD AUTO: 39.6 % (ref 34–48)
HGB BLD-MCNC: 13.2 G/DL (ref 11.5–15.5)
IMM GRANULOCYTES # BLD AUTO: <0.03 K/UL (ref 0–0.58)
IMM GRANULOCYTES NFR BLD: 0 % (ref 0–5)
LYMPHOCYTES NFR BLD: 1.2 K/UL (ref 1.5–4)
LYMPHOCYTES RELATIVE PERCENT: 23 % (ref 20–42)
MCH RBC QN AUTO: 31.4 PG (ref 26–35)
MCHC RBC AUTO-ENTMCNC: 33.3 G/DL (ref 32–34.5)
MCV RBC AUTO: 94.1 FL (ref 80–99.9)
MONOCYTES NFR BLD: 0.45 K/UL (ref 0.1–0.95)
MONOCYTES NFR BLD: 9 % (ref 2–12)
NEUTROPHILS NFR BLD: 67 % (ref 43–80)
NEUTS SEG NFR BLD: 3.54 K/UL (ref 1.8–7.3)
PLATELET # BLD AUTO: 267 K/UL (ref 130–450)
PMV BLD AUTO: 9.6 FL (ref 7–12)
POTASSIUM SERPL-SCNC: 4.8 MMOL/L (ref 3.5–5)
PROT SERPL-MCNC: 7.3 G/DL (ref 6.4–8.3)
RBC # BLD AUTO: 4.21 M/UL (ref 3.5–5.5)
SODIUM SERPL-SCNC: 138 MMOL/L (ref 132–146)
WBC OTHER # BLD: 5.3 K/UL (ref 4.5–11.5)

## 2023-10-25 PROCEDURE — 77063 BREAST TOMOSYNTHESIS BI: CPT

## 2023-10-25 PROCEDURE — 99213 OFFICE O/P EST LOW 20 MIN: CPT

## 2023-10-25 PROCEDURE — 36415 COLL VENOUS BLD VENIPUNCTURE: CPT

## 2023-10-25 PROCEDURE — 1123F ACP DISCUSS/DSCN MKR DOCD: CPT | Performed by: CLINICAL NURSE SPECIALIST

## 2023-10-25 PROCEDURE — 99213 OFFICE O/P EST LOW 20 MIN: CPT | Performed by: CLINICAL NURSE SPECIALIST

## 2023-10-25 PROCEDURE — 80053 COMPREHEN METABOLIC PANEL: CPT

## 2023-10-25 PROCEDURE — 85025 COMPLETE CBC W/AUTO DIFF WBC: CPT

## 2023-10-25 NOTE — PROGRESS NOTES
[place] score of 2+1+1 = 4 and has adjacent areas of   ductal carcinoma in situ, cribriform type, low nuclear grade as well as a 0.5 cm area of encapsulated papillary carcinoma (p63 negative for myoepithelial cells). The remaining breast tissue shows focal intraductal papillomata as well as focal microcalcifications. Breast Cancer Marker Studies:   Estrogen Receptors (ER): 90%   Progesterone Receptors (WA): 90%   Her-2/lupillo (c-erb B-2) protein expression: Negative (0)      Left axillary sentinel lymph node biopsy on 12/18/2020:  A. [place] lymph node #1, biopsy: Four (4) reactive nodes, negative for malignancy. B. [place] lymph node #2, biopsy: One (1) reactive node, negative for malignancy. C.  [place] lymph node #3, biopsy: Two (2) reactive nodes, negative for malignancy. pT1N0 M0 ER+ [place] + HER/2 -  Oncotype recurrence score: 0  Distant recurrence risk at 9 years with AI or PEREZ alone- 3%   Absolute chemotherapy benefit RS 0-10 All Ages: <1%    No indication for adjuvant chemotherapy. Recommended adjuvant RT followed by adjuvant endocrine therapy (Arimidex 1 mg po daily for 5 years). RT was started on 01/27/2021 and completed 02/17/2021. DEXA scan at her PCP's office in [place], [place] 08/30/2020 Normal bone mineral density. Arimidex 1 mg po daily was started on 02/18/2021 with fair tolerance so far. Left Breast U/S 07/08/2021 Benign findings with no sonographic evidence of malignancy in the left breast.     Bilateral Screening Mammogram 09/09/2021 Negative for malignancy. DEXA scan 02/14/2022 normal by WHO criteria  Bilateral Screening Mammogram 10/24/2022: Negative for malignancy. Imaging reviewed. Mammogram done 10/25/2023, no mammographic evidence of malignancy in either breast  Labs reviewed. MACK    10/25 Continue Arimidex(started 02/18/2021), Ca. ViTD.  Mammogram ordered for Oct 2024, will have patient call for appointment with Danette Pereyra  in [facility]

## 2024-02-19 DIAGNOSIS — Z85.3 PERSONAL HISTORY OF BREAST CANCER: ICD-10-CM

## 2024-02-19 RX ORDER — ANASTROZOLE 1 MG/1
1 TABLET ORAL DAILY
Qty: 90 TABLET | Refills: 0 | Status: SHIPPED | OUTPATIENT
Start: 2024-02-19

## 2024-04-24 ENCOUNTER — OFFICE VISIT (OUTPATIENT)
Dept: ONCOLOGY | Age: 69
End: 2024-04-24

## 2024-04-24 ENCOUNTER — HOSPITAL ENCOUNTER (OUTPATIENT)
Dept: INFUSION THERAPY | Age: 69
Discharge: HOME | End: 2024-04-24
Payer: MEDICARE

## 2024-04-24 VITALS
DIASTOLIC BLOOD PRESSURE: 62 MMHG | WEIGHT: 173 LBS | TEMPERATURE: 97.8 F | HEART RATE: 55 BPM | HEIGHT: 65 IN | OXYGEN SATURATION: 99 % | SYSTOLIC BLOOD PRESSURE: 130 MMHG | BODY MASS INDEX: 28.82 KG/M2

## 2024-04-24 DIAGNOSIS — Z85.3 PERSONAL HISTORY OF BREAST CANCER: ICD-10-CM

## 2024-04-24 DIAGNOSIS — Z79.811 AROMATASE INHIBITOR USE: Primary | ICD-10-CM

## 2024-04-24 LAB
ALBUMIN SERPL-MCNC: 4.4 G/DL (ref 3.5–5.2)
ALP SERPL-CCNC: 76 U/L (ref 35–104)
ALT SERPL-CCNC: 15 U/L (ref 0–32)
ANION GAP SERPL CALCULATED.3IONS-SCNC: 9 MMOL/L (ref 7–16)
AST SERPL-CCNC: 19 U/L (ref 0–31)
BASOPHILS # BLD: 0.03 K/UL (ref 0–0.2)
BASOPHILS NFR BLD: 1 % (ref 0–2)
BILIRUB SERPL-MCNC: 0.3 MG/DL (ref 0–1.2)
BUN SERPL-MCNC: 16 MG/DL (ref 6–23)
CALCIUM SERPL-MCNC: 9.8 MG/DL (ref 8.6–10.2)
CHLORIDE SERPL-SCNC: 99 MMOL/L (ref 98–107)
CO2 SERPL-SCNC: 27 MMOL/L (ref 22–29)
CREAT SERPL-MCNC: 1.2 MG/DL (ref 0.5–1)
EOSINOPHIL # BLD: 0.04 K/UL (ref 0.05–0.5)
EOSINOPHILS RELATIVE PERCENT: 1 % (ref 0–6)
ERYTHROCYTE [DISTWIDTH] IN BLOOD BY AUTOMATED COUNT: 12.4 % (ref 11.5–15)
GFR SERPL CREATININE-BSD FRML MDRD: 48 ML/MIN/1.73M2
GLUCOSE SERPL-MCNC: 104 MG/DL (ref 74–99)
HCT VFR BLD AUTO: 40 % (ref 34–48)
HGB BLD-MCNC: 13.2 G/DL (ref 11.5–15.5)
IMM GRANULOCYTES # BLD AUTO: <0.03 K/UL (ref 0–0.58)
IMM GRANULOCYTES NFR BLD: 0 % (ref 0–5)
LYMPHOCYTES NFR BLD: 1.13 K/UL (ref 1.5–4)
LYMPHOCYTES RELATIVE PERCENT: 26 % (ref 20–42)
MCH RBC QN AUTO: 31.1 PG (ref 26–35)
MCHC RBC AUTO-ENTMCNC: 33 G/DL (ref 32–34.5)
MCV RBC AUTO: 94.1 FL (ref 80–99.9)
MONOCYTES NFR BLD: 0.35 K/UL (ref 0.1–0.95)
MONOCYTES NFR BLD: 8 % (ref 2–12)
NEUTROPHILS NFR BLD: 64 % (ref 43–80)
NEUTS SEG NFR BLD: 2.82 K/UL (ref 1.8–7.3)
PLATELET # BLD AUTO: 277 K/UL (ref 130–450)
PMV BLD AUTO: 9.9 FL (ref 7–12)
POTASSIUM SERPL-SCNC: 4 MMOL/L (ref 3.5–5)
PROT SERPL-MCNC: 7.3 G/DL (ref 6.4–8.3)
RBC # BLD AUTO: 4.25 M/UL (ref 3.5–5.5)
SODIUM SERPL-SCNC: 135 MMOL/L (ref 132–146)
WBC OTHER # BLD: 4.4 K/UL (ref 4.5–11.5)

## 2024-04-24 PROCEDURE — 85025 COMPLETE CBC W/AUTO DIFF WBC: CPT

## 2024-04-24 PROCEDURE — 80053 COMPREHEN METABOLIC PANEL: CPT

## 2024-04-24 PROCEDURE — 36415 COLL VENOUS BLD VENIPUNCTURE: CPT

## 2024-04-24 RX ORDER — ANASTROZOLE 1 MG/1
1 TABLET ORAL DAILY
Qty: 90 TABLET | Refills: 3 | Status: SHIPPED | OUTPATIENT
Start: 2024-04-24

## 2024-04-24 NOTE — PROGRESS NOTES
Department of [facility] Oncology   Attending Clinic Note    Reason for Visit: Follow-up on a patient with Left Breast Cancer    PCP:  Stefania Sawant DO    History of Present Illness:  67 year old female found to have lesion in upper inner quadrant of left breast    OBSTETRIC RELATED HISTORY:   Age of menarche was 10.   Age of menopause was 44.   Patient denies hormonal therapy.   Patient is .   Age of first live birth was 24.   Patient did not breast feed.   Is patient interested in fertility information about fertility preservation? No     Left breast US 2020:  1.4 cm oval nodule with indistinct and microlobulated margin left breast upper inner aspect middle depth.  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  E-cadherin is positive in the papillary lesion and negative in the atypical lobular hyperplasia.     Left breast Needle Localized Lumpectomy on 2020:  Invasive lobular and invasive ductal carcinoma with foci of lobular carcinoma in situ, ductal carcinoma in situ, low-grade, cribriform type and encapsulated papillary carcinoma.  Margins of excision free of carcinoma    Comment:   The lumpectomy specimen contains a mixed invasive lobular (E-cadherin negative, 1.2 x 0.6 x 0.5 cm) and ductal carcinoma (E-cadherin positive, 1.5 x 1.3 x 1.1 cm).    The invasive lobular component has a [place] score of 3+1+1 = 5 with adjacent areas of   lobular carcinoma in situ.  The invasive lobular and LCIS come to within 0.5 mm of the inked lateral margin of excision.    The invasive ductal carcinoma has a

## 2024-10-24 DIAGNOSIS — Z85.3 PERSONAL HISTORY OF BREAST CANCER: Primary | ICD-10-CM

## 2024-10-30 ENCOUNTER — OFFICE VISIT (OUTPATIENT)
Dept: ONCOLOGY | Age: 69
End: 2024-10-30

## 2024-10-30 ENCOUNTER — HOSPITAL ENCOUNTER (OUTPATIENT)
Dept: GENERAL RADIOLOGY | Age: 69
Discharge: HOME | End: 2024-11-01
Payer: MEDICARE

## 2024-10-30 ENCOUNTER — HOSPITAL ENCOUNTER (OUTPATIENT)
Dept: INFUSION THERAPY | Age: 69
Discharge: HOME | End: 2024-10-30
Payer: MEDICARE

## 2024-10-30 VITALS
TEMPERATURE: 97.6 F | BODY MASS INDEX: 28.16 KG/M2 | OXYGEN SATURATION: 99 % | HEART RATE: 49 BPM | DIASTOLIC BLOOD PRESSURE: 70 MMHG | WEIGHT: 169.2 LBS | RESPIRATION RATE: 17 BRPM | SYSTOLIC BLOOD PRESSURE: 156 MMHG

## 2024-10-30 VITALS — HEIGHT: 65 IN | WEIGHT: 173 LBS | BODY MASS INDEX: 28.82 KG/M2

## 2024-10-30 VITALS — WEIGHT: 173 LBS | HEIGHT: 65 IN | BODY MASS INDEX: 28.82 KG/M2

## 2024-10-30 DIAGNOSIS — Z79.811 AROMATASE INHIBITOR USE: ICD-10-CM

## 2024-10-30 DIAGNOSIS — Z12.31 ENCOUNTER FOR SCREENING MAMMOGRAM FOR MALIGNANT NEOPLASM OF BREAST: ICD-10-CM

## 2024-10-30 DIAGNOSIS — Z85.3 PERSONAL HISTORY OF BREAST CANCER: Primary | ICD-10-CM

## 2024-10-30 DIAGNOSIS — Z85.3 PERSONAL HISTORY OF BREAST CANCER: ICD-10-CM

## 2024-10-30 LAB
ALBUMIN SERPL-MCNC: 4.4 G/DL (ref 3.5–5.2)
ALP SERPL-CCNC: 74 U/L (ref 35–104)
ALT SERPL-CCNC: 23 U/L (ref 0–32)
ANION GAP SERPL CALCULATED.3IONS-SCNC: 10 MMOL/L (ref 7–16)
AST SERPL-CCNC: 25 U/L (ref 0–31)
BASOPHILS # BLD: 0.03 K/UL (ref 0–0.2)
BASOPHILS NFR BLD: 1 % (ref 0–2)
BILIRUB SERPL-MCNC: 0.4 MG/DL (ref 0–1.2)
BUN SERPL-MCNC: 18 MG/DL (ref 6–23)
CALCIUM SERPL-MCNC: 9.4 MG/DL (ref 8.6–10.2)
CHLORIDE SERPL-SCNC: 101 MMOL/L (ref 98–107)
CO2 SERPL-SCNC: 25 MMOL/L (ref 22–29)
CREAT SERPL-MCNC: 1.2 MG/DL (ref 0.5–1)
EOSINOPHIL # BLD: 0.05 K/UL (ref 0.05–0.5)
EOSINOPHILS RELATIVE PERCENT: 1 % (ref 0–6)
ERYTHROCYTE [DISTWIDTH] IN BLOOD BY AUTOMATED COUNT: 12 % (ref 11.5–15)
GFR, ESTIMATED: 47 ML/MIN/1.73M2
GLUCOSE SERPL-MCNC: 111 MG/DL (ref 74–99)
HCT VFR BLD AUTO: 39.8 % (ref 34–48)
HGB BLD-MCNC: 13 G/DL (ref 11.5–15.5)
IMM GRANULOCYTES # BLD AUTO: <0.03 K/UL (ref 0–0.58)
IMM GRANULOCYTES NFR BLD: 0 % (ref 0–5)
LYMPHOCYTES NFR BLD: 1.02 K/UL (ref 1.5–4)
LYMPHOCYTES RELATIVE PERCENT: 23 % (ref 20–42)
MCH RBC QN AUTO: 31.1 PG (ref 26–35)
MCHC RBC AUTO-ENTMCNC: 32.7 G/DL (ref 32–34.5)
MCV RBC AUTO: 95.2 FL (ref 80–99.9)
MONOCYTES NFR BLD: 0.37 K/UL (ref 0.1–0.95)
MONOCYTES NFR BLD: 9 % (ref 2–12)
NEUTROPHILS NFR BLD: 66 % (ref 43–80)
NEUTS SEG NFR BLD: 2.88 K/UL (ref 1.8–7.3)
PLATELET # BLD AUTO: 262 K/UL (ref 130–450)
PMV BLD AUTO: 9.9 FL (ref 7–12)
POTASSIUM SERPL-SCNC: 4.7 MMOL/L (ref 3.5–5)
PROT SERPL-MCNC: 7.4 G/DL (ref 6.4–8.3)
RBC # BLD AUTO: 4.18 M/UL (ref 3.5–5.5)
SODIUM SERPL-SCNC: 136 MMOL/L (ref 132–146)
WBC OTHER # BLD: 4.4 K/UL (ref 4.5–11.5)

## 2024-10-30 PROCEDURE — 36415 COLL VENOUS BLD VENIPUNCTURE: CPT

## 2024-10-30 PROCEDURE — 80053 COMPREHEN METABOLIC PANEL: CPT

## 2024-10-30 PROCEDURE — 77063 BREAST TOMOSYNTHESIS BI: CPT

## 2024-10-30 PROCEDURE — 77080 DXA BONE DENSITY AXIAL: CPT

## 2024-10-30 PROCEDURE — 85025 COMPLETE CBC W/AUTO DIFF WBC: CPT

## 2024-10-30 NOTE — PROGRESS NOTES
her. Labs pending . Would like labs and note sent to her PCP Dr. Bowilng   Does see and nephrologist Dr. Marvel Spann once a year     4/24/2024-labs reviewed , dexa scan and  ordered  MACK on exam   Mamogramm  dexa   10/30/2024 - Continue Arimidex(started 02/18/2021), Ca.ViTD.   Mammogram done today no evidence of disease, next years mammogram order placed  DEXA in process    RTC in 6 months for visit and labs , then  Oct 2025 Mammogram same day    JANNIE Monk,ACNS-BC,AGACNP-BC  HEMATOLOGY/MEDICAL ONCOLOGY  [facility] SPECIALTY CARE [facility] MED ONCOLOGY  [address] [facility] [address]  Dept: [phone]  Loc: [phone]

## 2024-10-31 ENCOUNTER — TELEPHONE (OUTPATIENT)
Dept: INFUSION THERAPY | Age: 69
End: 2024-10-31

## 2024-10-31 NOTE — TELEPHONE ENCOUNTER
Patient's MGM reviewed with Darcy TSANG NP. Looked good, patient notified and states understanding

## 2025-04-16 DIAGNOSIS — Z85.3 PERSONAL HISTORY OF BREAST CANCER: ICD-10-CM

## 2025-04-16 RX ORDER — ANASTROZOLE 1 MG/1
1 TABLET ORAL DAILY
Qty: 90 TABLET | Refills: 3 | Status: SHIPPED | OUTPATIENT
Start: 2025-04-16

## 2025-04-30 ENCOUNTER — OFFICE VISIT (OUTPATIENT)
Dept: ONCOLOGY | Age: 70
End: 2025-04-30
Payer: MEDICARE

## 2025-04-30 ENCOUNTER — HOSPITAL ENCOUNTER (OUTPATIENT)
Dept: INFUSION THERAPY | Age: 70
Discharge: HOME | End: 2025-04-30
Payer: MEDICARE

## 2025-04-30 VITALS
BODY MASS INDEX: 27.84 KG/M2 | DIASTOLIC BLOOD PRESSURE: 65 MMHG | HEIGHT: 65 IN | OXYGEN SATURATION: 100 % | SYSTOLIC BLOOD PRESSURE: 149 MMHG | TEMPERATURE: 97.3 F | HEART RATE: 50 BPM | WEIGHT: 167.1 LBS

## 2025-04-30 DIAGNOSIS — Z85.3 PERSONAL HISTORY OF BREAST CANCER: ICD-10-CM

## 2025-04-30 LAB
ALBUMIN SERPL-MCNC: 4.3 G/DL (ref 3.5–5.2)
ALP SERPL-CCNC: 83 U/L (ref 35–104)
ALT SERPL-CCNC: 23 U/L (ref 0–35)
ANION GAP SERPL CALCULATED.3IONS-SCNC: 13 MMOL/L (ref 7–16)
AST SERPL-CCNC: 29 U/L (ref 0–35)
BASOPHILS # BLD: 0.03 K/UL (ref 0–0.2)
BASOPHILS NFR BLD: 1 % (ref 0–2)
BILIRUB SERPL-MCNC: 0.3 MG/DL (ref 0–1.2)
BUN SERPL-MCNC: 19 MG/DL (ref 8–23)
CALCIUM SERPL-MCNC: 9.8 MG/DL (ref 8.8–10.2)
CHLORIDE SERPL-SCNC: 104 MMOL/L (ref 98–107)
CO2 SERPL-SCNC: 24 MMOL/L (ref 22–29)
CREAT SERPL-MCNC: 1.2 MG/DL (ref 0.5–1)
EOSINOPHIL # BLD: 0.12 K/UL (ref 0.05–0.5)
EOSINOPHILS RELATIVE PERCENT: 2 % (ref 0–6)
ERYTHROCYTE [DISTWIDTH] IN BLOOD BY AUTOMATED COUNT: 12.5 % (ref 11.5–15)
GFR, ESTIMATED: 49 ML/MIN/1.73M2
GLUCOSE SERPL-MCNC: 100 MG/DL (ref 74–99)
HCT VFR BLD AUTO: 36.4 % (ref 34–48)
HGB BLD-MCNC: 12.2 G/DL (ref 11.5–15.5)
IMM GRANULOCYTES # BLD AUTO: <0.03 K/UL (ref 0–0.58)
IMM GRANULOCYTES NFR BLD: 0 % (ref 0–5)
LYMPHOCYTES NFR BLD: 1.16 K/UL (ref 1.5–4)
LYMPHOCYTES RELATIVE PERCENT: 21 % (ref 20–42)
MCH RBC QN AUTO: 31.8 PG (ref 26–35)
MCHC RBC AUTO-ENTMCNC: 33.5 G/DL (ref 32–34.5)
MCV RBC AUTO: 94.8 FL (ref 80–99.9)
MONOCYTES NFR BLD: 0.43 K/UL (ref 0.1–0.95)
MONOCYTES NFR BLD: 8 % (ref 2–12)
NEUTROPHILS NFR BLD: 68 % (ref 43–80)
NEUTS SEG NFR BLD: 3.78 K/UL (ref 1.8–7.3)
PLATELET # BLD AUTO: 247 K/UL (ref 130–450)
PMV BLD AUTO: 9.9 FL (ref 7–12)
POTASSIUM SERPL-SCNC: 4.5 MMOL/L (ref 3.5–5.1)
PROT SERPL-MCNC: 7.2 G/DL (ref 6.4–8.3)
RBC # BLD AUTO: 3.84 M/UL (ref 3.5–5.5)
SODIUM SERPL-SCNC: 141 MMOL/L (ref 136–145)
WBC OTHER # BLD: 5.5 K/UL (ref 4.5–11.5)

## 2025-04-30 PROCEDURE — 99212 OFFICE O/P EST SF 10 MIN: CPT

## 2025-04-30 PROCEDURE — 85025 COMPLETE CBC W/AUTO DIFF WBC: CPT

## 2025-04-30 PROCEDURE — 80053 COMPREHEN METABOLIC PANEL: CPT

## 2025-04-30 PROCEDURE — 36415 COLL VENOUS BLD VENIPUNCTURE: CPT

## 2025-04-30 RX ORDER — ANASTROZOLE 1 MG/1
1 TABLET ORAL DAILY
Qty: 90 TABLET | Refills: 3 | Status: SHIPPED | OUTPATIENT
Start: 2025-04-30

## 2025-04-30 NOTE — PROGRESS NOTES
Department of [facility] Med Oncology   Attending Clinic Note    Reason for Visit: Follow-up on a patient with Left Breast Cancer    PCP:  Stefania Sawant DO    History of Present Illness:  67 year old female found to have lesion in upper inner quadrant of left breast          OBSTETRIC RELATED HISTORY:   Age of menarche was 10.   Age of menopause was 44.   Patient denies hormonal therapy.   Patient is .   Age of first live birth was 24.   Patient did not breast feed.   Is patient interested in fertility information about fertility preservation? No     Left breast US 2020:  1.4 cm oval nodule with indistinct and microlobulated margin left breast upper inner aspect middle depth.  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  E-cadherin is positive in the papillary lesion and negative in the atypical lobular hyperplasia.     Left breast Needle Localized Lumpectomy on 2020:  Invasive lobular and invasive ductal carcinoma with foci of lobular carcinoma in situ, ductal carcinoma in situ, low-grade, cribriform type and encapsulated papillary carcinoma.  Margins of excision free of carcinoma    Comment:   The lumpectomy specimen contains a mixed invasive lobular (E-cadherin negative, 1.2 x 0.6 x 0.5 cm) and ductal carcinoma (E-cadherin positive, 1.5 x 1.3 x 1.1 cm).    The invasive lobular component has a Waldo score of 3+1+1 = 5 with adjacent areas of   lobular carcinoma in situ.  The invasive lobular and LCIS come to within 0.5 mm of the inked lateral margin of excision.    The invasive ductal carcinoma has

## (undated) DEVICE — GLOVE SURG L12IN SZ 65FNGR THK94MIL TRNSLUC YEL LTX

## (undated) DEVICE — TUBING, SUCTION, 3/16" X 12', STRAIGHT: Brand: MEDLINE

## (undated) DEVICE — 3M(TM) MEDIPORE(TM) +PAD SOFT CLOTH ADHESIVE WOUND DRESSING 3569: Brand: 3M™ MEDIPORE™

## (undated) DEVICE — SET INST MINOR PROCEDURE

## (undated) DEVICE — DRAPE THER FLUID WARMING 66X44 IN FLAT SLUSH DBL DISC ORS

## (undated) DEVICE — PROBE GAM TRUNODE DISP EACH

## (undated) DEVICE — PLASMABLADE PS210-030S 3.0S LOCK: Brand: PLASMABLADE™

## (undated) DEVICE — Device

## (undated) DEVICE — TOWEL,OR,DSP,ST,BLUE,STD,6/PK,12PK/CS: Brand: MEDLINE

## (undated) DEVICE — STANDARD HYPODERMIC NEEDLE,ALUMINUM HUB: Brand: MONOJECT

## (undated) DEVICE — MARKER SURG MARGIN STD 6 CLR INK ASST CORR CLP

## (undated) DEVICE — GOWN,SIRUS,FABRNF,L,20/CS: Brand: MEDLINE

## (undated) DEVICE — SURGICAL PROCEDURE PACK BASIC

## (undated) DEVICE — PACK,UNIV, II AURORA: Brand: MEDLINE

## (undated) DEVICE — APPLICATOR MEDICATED 26 CC SOLUTION HI LT ORNG CHLORAPREP

## (undated) DEVICE — APPLIER LIG CLP M L11IN TI STR RNG HNDL FOR 20 CLP DISP

## (undated) DEVICE — 3M(TM) MEDIPORE(TM) +PAD SOFT CLOTH ADHESIVE WOUND DRESSING 3566: Brand: 3M™ MEDIPORE™

## (undated) DEVICE — SYRINGE MED 10ML TRNSLUC BRL PLUNG BLK MRK POLYPR CTRL

## (undated) DEVICE — SOLUTION IV IRRIG POUR BRL 0.9% SODIUM CHL 2F7124

## (undated) DEVICE — PATIENT RETURN ELECTRODE, SINGLE-USE, CONTACT QUALITY MONITORING, ADULT, WITH 9FT CORD, FOR PATIENTS WEIGING OVER 33LBS. (15KG): Brand: MEGADYNE

## (undated) DEVICE — STRIP,CLOSURE,WOUND,MEDI-STRIP,1/2X4: Brand: MEDLINE

## (undated) DEVICE — GOWN,BREATHABLE SLV,AURORA,XLG,STRL: Brand: MEDLINE